# Patient Record
Sex: FEMALE | Race: WHITE | NOT HISPANIC OR LATINO | Employment: FULL TIME | URBAN - METROPOLITAN AREA
[De-identification: names, ages, dates, MRNs, and addresses within clinical notes are randomized per-mention and may not be internally consistent; named-entity substitution may affect disease eponyms.]

---

## 2017-02-21 RX ORDER — HYDROCHLOROTHIAZIDE 25 MG/1
TABLET ORAL
Qty: 90 TABLET | Refills: 3 | Status: SHIPPED | OUTPATIENT
Start: 2017-02-21 | End: 2018-02-05

## 2017-06-07 ENCOUNTER — OFFICE VISIT (OUTPATIENT)
Dept: FAMILY MEDICINE | Facility: CLINIC | Age: 47
End: 2017-06-07
Payer: COMMERCIAL

## 2017-06-07 VITALS
HEIGHT: 66 IN | DIASTOLIC BLOOD PRESSURE: 90 MMHG | HEART RATE: 85 BPM | BODY MASS INDEX: 41.49 KG/M2 | WEIGHT: 258.19 LBS | OXYGEN SATURATION: 98 % | SYSTOLIC BLOOD PRESSURE: 120 MMHG

## 2017-06-07 DIAGNOSIS — S23.100A SUBLUXATION OF COSTOVERTEBRAL JOINT, INITIAL ENCOUNTER: Primary | ICD-10-CM

## 2017-06-07 PROCEDURE — 99999 PR PBB SHADOW E&M-EST. PATIENT-LVL III: CPT | Mod: PBBFAC,,,

## 2017-06-07 PROCEDURE — 99213 OFFICE O/P EST LOW 20 MIN: CPT | Mod: S$GLB,,,

## 2017-06-07 RX ORDER — GABAPENTIN 300 MG/1
CAPSULE ORAL
Refills: 1 | COMMUNITY
Start: 2017-04-08 | End: 2018-02-05

## 2017-06-07 RX ORDER — CYCLOBENZAPRINE HCL 10 MG
10 TABLET ORAL 3 TIMES DAILY PRN
Qty: 30 TABLET | Refills: 1 | Status: SHIPPED | OUTPATIENT
Start: 2017-06-07 | End: 2017-06-17

## 2017-06-07 NOTE — PROGRESS NOTES
Subjective:       Patient ID: Teresa Valdes is a 46 y.o. female.    Chief Complaint: Back Pain    HPI She is complaining of stabbing pain for the past 2 days. She may have pulled a muscle retrieving her luggage at the airport returning from [place] for a Gastric sleeve procedure on May 31, 2017. She is also short of breath. Sharp stabbing pleuritic pain.        She is under the care of Dr. Faraz Diego for lumbar spinal stenosis.       Review of Systems   Constitutional: Positive for activity change and appetite change. Negative for chills, fatigue and unexpected weight change.        Recovering from surgery    HENT: Negative for trouble swallowing and voice change.    Eyes: Negative for visual disturbance.   Respiratory: Positive for shortness of breath. Negative for apnea and cough.    Cardiovascular: Negative.  Negative for chest pain, palpitations and leg swelling.   Gastrointestinal: Positive for nausea. Negative for abdominal distention, abdominal pain, constipation, diarrhea and vomiting.   Endocrine: Negative for cold intolerance, heat intolerance, polydipsia and polyuria.   Genitourinary: Negative.  Negative for difficulty urinating.   Musculoskeletal: Positive for back pain.   Skin: Negative for rash.   Allergic/Immunologic: Negative for environmental allergies.   Neurological: Positive for weakness.        Both legs    Hematological: Negative.  Negative for adenopathy. Does not bruise/bleed easily.   Psychiatric/Behavioral: Negative.    All other systems reviewed and are negative.      Objective:      Vitals:    06/07/17 1111   BP: (!) 120/90   Pulse: 85     Physical Exam   Constitutional: She is oriented to person, place, and time. She appears well-developed and well-nourished. She is active.  Non-toxic appearance. She does not have a sickly appearance. She does not appear ill. No distress.   HENT:   Head: Normocephalic and atraumatic.   Right Ear: External ear normal.   Left Ear: External ear normal.    Nose: Nose normal.   Hearing normal.    Eyes: Conjunctivae are normal. Pupils are equal, round, and reactive to light.   Neck: Normal range of motion. Neck supple. No thyroid mass and no thyromegaly present.   Cardiovascular: Normal rate, regular rhythm, normal heart sounds and intact distal pulses.  Exam reveals no gallop and no friction rub.    No murmur heard.  Pulses:       Dorsalis pedis pulses are 2+ on the right side, and 2+ on the left side.        Posterior tibial pulses are 2+ on the right side, and 2+ on the left side.   Pulmonary/Chest: Effort normal and breath sounds normal. No respiratory distress. She exhibits no tenderness.   Musculoskeletal: Normal range of motion. She exhibits no edema.   Lymphadenopathy:     She has no cervical adenopathy.   Neurological: She is alert and oriented to person, place, and time. She has normal strength. Coordination and gait normal.   Skin: Skin is warm and dry. She is not diaphoretic. No pallor.   Psychiatric: She has a normal mood and affect. Her speech is normal and behavior is normal. Judgment and thought content normal. Cognition and memory are normal.   Vitals reviewed.      Assessment:       1. Subluxation of costovertebral joint, initial encounter        Plan:       Subluxation of costovertebral joint, initial encounter    Other orders  -     cyclobenzaprine (FLEXERIL) 10 MG tablet; Take 1 tablet (10 mg total) by mouth 3 (three) times daily as needed for Muscle spasms.  Dispense: 30 tablet; Refill: 1      Return if symptoms worsen or fail to improve.     Consider chiropractic treatment

## 2017-10-25 ENCOUNTER — OFFICE VISIT (OUTPATIENT)
Dept: URGENT CARE | Facility: CLINIC | Age: 47
End: 2017-10-25
Payer: COMMERCIAL

## 2017-10-25 VITALS
RESPIRATION RATE: 18 BRPM | HEART RATE: 93 BPM | BODY MASS INDEX: 32.47 KG/M2 | HEIGHT: 66 IN | WEIGHT: 202 LBS | SYSTOLIC BLOOD PRESSURE: 130 MMHG | DIASTOLIC BLOOD PRESSURE: 80 MMHG | TEMPERATURE: 99 F | OXYGEN SATURATION: 99 %

## 2017-10-25 DIAGNOSIS — R09.82 POST-NASAL DRIP: ICD-10-CM

## 2017-10-25 DIAGNOSIS — R42 DIZZINESS: ICD-10-CM

## 2017-10-25 DIAGNOSIS — J02.9 ACUTE PHARYNGITIS, UNSPECIFIED ETIOLOGY: Primary | ICD-10-CM

## 2017-10-25 LAB
CTP QC/QA: YES
S PYO RRNA THROAT QL PROBE: NEGATIVE

## 2017-10-25 PROCEDURE — 87880 STREP A ASSAY W/OPTIC: CPT | Mod: QW,S$GLB,, | Performed by: EMERGENCY MEDICINE

## 2017-10-25 PROCEDURE — 96372 THER/PROPH/DIAG INJ SC/IM: CPT | Mod: S$GLB,,, | Performed by: EMERGENCY MEDICINE

## 2017-10-25 PROCEDURE — 99214 OFFICE O/P EST MOD 30 MIN: CPT | Mod: 25,S$GLB,, | Performed by: EMERGENCY MEDICINE

## 2017-10-25 RX ORDER — BETAMETHASONE SODIUM PHOSPHATE AND BETAMETHASONE ACETATE 3; 3 MG/ML; MG/ML
6 INJECTION, SUSPENSION INTRA-ARTICULAR; INTRALESIONAL; INTRAMUSCULAR; SOFT TISSUE
Status: COMPLETED | OUTPATIENT
Start: 2017-10-25 | End: 2017-10-25

## 2017-10-25 RX ORDER — MECLIZINE HYDROCHLORIDE 25 MG/1
25 TABLET ORAL 3 TIMES DAILY PRN
Qty: 15 TABLET | Refills: 0 | Status: SHIPPED | OUTPATIENT
Start: 2017-10-25 | End: 2018-02-05

## 2017-10-25 RX ORDER — AZITHROMYCIN 250 MG/1
TABLET, FILM COATED ORAL
Qty: 6 TABLET | Refills: 0 | Status: SHIPPED | OUTPATIENT
Start: 2017-10-25 | End: 2018-02-05

## 2017-10-25 RX ADMIN — BETAMETHASONE SODIUM PHOSPHATE AND BETAMETHASONE ACETATE 6 MG: 3; 3 INJECTION, SUSPENSION INTRA-ARTICULAR; INTRALESIONAL; INTRAMUSCULAR; SOFT TISSUE at 10:10

## 2017-10-25 NOTE — PROGRESS NOTES
"Subjective:       Patient ID: Teresa Valdes is a 47 y.o. female.    Vitals:  height is 5' 6" (1.676 m) and weight is 91.6 kg (202 lb). Her temperature is 98.5 °F (36.9 °C). Her blood pressure is 130/80 and her pulse is 93. Her respiration is 18 and oxygen saturation is 99%.     Chief Complaint: Sore Throat and Dizziness    2-3 d hx sore throat, post nasal drip, and felt dizzy this Am, would like z-gin prn, OK with steroid IM.      Sore Throat    This is a new problem. The current episode started in the past 7 days. The problem has been gradually worsening. There has been no fever. The pain is at a severity of 8/10. The pain is mild. Associated symptoms include ear pain, swollen glands and trouble swallowing. Pertinent negatives include no abdominal pain, congestion, coughing, headaches, hoarse voice or shortness of breath. She has tried oral narcotic analgesics and acetaminophen for the symptoms. The treatment provided no relief.   Dizziness:   Chronicity:  New  Onset:  Today  Progression since onset:  Gradually worsening  Frequency:  Constantly  Duration:  Very brief  Dizziness characteristics:  Off-balance and lightheaded/impending faint  Initial Spell Date and Length:  30 seconds   Associated symptoms: ear pain and light-headedness.no fever, no headaches, no nausea and no chest pain.  Treatments tried:  Nothing  Improvements on treatment:  No relief    Review of Systems   Constitution: Negative for chills, fever and malaise/fatigue.   HENT: Positive for ear pain, sore throat and trouble swallowing. Negative for congestion and hoarse voice.    Eyes: Negative for discharge and redness.   Cardiovascular: Negative for chest pain, dyspnea on exertion and leg swelling.   Respiratory: Negative for cough, shortness of breath, sputum production and wheezing.    Musculoskeletal: Negative for myalgias.   Gastrointestinal: Negative for abdominal pain and nausea.   Neurological: Positive for dizziness and light-headedness. " Negative for headaches.       Objective:      Physical Exam   Constitutional: She is oriented to person, place, and time. She appears well-developed and well-nourished.   HENT:   Head: Normocephalic and atraumatic.   Right Ear: External ear normal.   Left Ear: External ear normal.   Nose: Mucosal edema present. Right sinus exhibits no maxillary sinus tenderness and no frontal sinus tenderness. Left sinus exhibits no maxillary sinus tenderness and no frontal sinus tenderness.   Mouth/Throat: Uvula is midline and mucous membranes are normal. Oropharyngeal exudate and posterior oropharyngeal erythema present.   Eyes: EOM are normal. Pupils are equal, round, and reactive to light.   Neck: Normal range of motion. Neck supple.   Bilat anterior cervical LN tenderness to palp   Cardiovascular: Normal rate, regular rhythm and normal heart sounds.    Pulmonary/Chest: Breath sounds normal.   Musculoskeletal: Normal range of motion.   Neurological: She is alert and oriented to person, place, and time. No cranial nerve deficit. She exhibits normal muscle tone.   Skin: Skin is warm and dry.   Psychiatric: She has a normal mood and affect. Her behavior is normal.       Assessment:       1. Acute pharyngitis, unspecified etiology    2. Post-nasal drip    3. Dizziness        Plan:         Acute pharyngitis, unspecified etiology  -     POCT rapid strep A  -     betamethasone acetate-betamethasone sodium phosphate injection 6 mg; Inject 1 mL (6 mg total) into the muscle one time.  -     azithromycin (ZITHROMAX Z-MATILDE) 250 MG tablet; Take 2 tablets (500 mg) on  Day 1,  followed by 1 tablet (250 mg) once daily on Days 2 through 5.  Start in 1-2 days if not improved.  Dispense: 6 tablet; Refill: 0    Post-nasal drip  -     betamethasone acetate-betamethasone sodium phosphate injection 6 mg; Inject 1 mL (6 mg total) into the muscle one time.  -     azithromycin (ZITHROMAX Z-MATILDE) 250 MG tablet; Take 2 tablets (500 mg) on  Day 1,  followed  by 1 tablet (250 mg) once daily on Days 2 through 5.  Start in 1-2 days if not improved.  Dispense: 6 tablet; Refill: 0    Dizziness  -     meclizine (ANTIVERT) 25 mg tablet; Take 1 tablet (25 mg total) by mouth 3 (three) times daily as needed for Dizziness.  Dispense: 15 tablet; Refill: 0      John Bergman MD  Go to the Emergency Department for any problems

## 2017-10-25 NOTE — PATIENT INSTRUCTIONS
Dizziness (Vertigo) and Balance Problems: Staying Safe     Replace burned-out lightbulbs to keep your home safe and well lit.   Falls or accidents can lead to pain, broken bones, and fear of future falls. Protect yourself and others by preparing for episodes. Simple steps can help you stay safe at home and wherever you go.  Lighting  Keep all areas well lit. This helps your eyes send the right signals to the brain. It also makes you less likely to trip and fall. If bright lights make symptoms worse, dim the lights or lie in a dark room until the dizziness passes. Then turn the lights back to their normal level.  Tips:  · Keep a flashlight by the bed.  · Place nightlights in bathrooms and hallways.  · Replace burned-out bulbs, or have someone replace them for you.  Preventing falls  To reduce your risk of falling:  · Get out of bed or up from a chair slowly.  · Wear low-heeled shoes that fit properly and have slip-resistant soles.  · Remove throw rugs. Clear clutter from walkways.  · Use handrails on stairs. Have handrails installed or adjusted if needed.  · Install grab bars in the bathroom. Don't use towel racks for balance.  · Use a shower stool. Also put adhesive strips in the shower or on the tub floor.  Going out  With a little time and preparation, you can get around safely.  Tips:  · Bring a cane or walking aid if needed.  · Give yourself plenty of time in case you start to get dizzy.  · Ask your healthcare provider what type of exercise is safe for your condition.  · Be patient. If an activity such as walking through a crowded shop causes you stress, you may not be ready for it yet.  Driving  If you become dizzy or disoriented while driving, you could hurt yourself and others. That's why it's best to not drive until symptoms have gone away. In some cases, your license may be temporarily held until it's safe for you to drive again.  For safety:  · Ask a friend to drive for you.  · Take public  transportation.  · Walk to stores and other places when you can.  Asking for help  Don't be afraid to ask for help running errands, cooking meals, and doing exercise. Whether it's a friend, loved one, neighbor, or stranger on the street, a little help can make a world of difference.   Date Last Reviewed: 11/1/2016 © 2000-2017 [employer]. [address] [place] [address], [place] [address]. All rights reserved. This information is not intended as a substitute for professional medical care. Always follow your healthcare professional's instructions.        Inner Ear Problems: Causes of Dizziness (Vertigo)       Benign positional vertigo (BPV)  This is the most common cause of vertigo. BPV is also called benign positional paroxysmal vertigo (BPPV). It happens when crystals in the ear canals shift into the wrong place. Vertigo usually occurs when you move your head in a certain way. This can happen when turning in bed, bending, or looking up. Because BPV comes on quickly, you should think about if you are safe to drive or do other tasks that need your full attention.  BPV:  · Causes vertigo that last for seconds. Vertigo can occur several times a day, depending on body position.  · Doesnt cause hearing loss  · Often goes away on its own. But it but may go away sooner with treatment.  Infection or inflammation  Sometimes the semicircular canals swell and send incorrect balance signals. This problem may be caused by a viral infection. Depending on the cause, your hearing can be affected (labyrinthitis). Or your hearing can remain normal (neuronitis).  Infection or inflammation:  · Causes vertigo that lasts for hours or days. The first episode is usually the worst.  · Can cause hearing loss  · Often goes away on its own. But it may go away sooner with treatment.  You may need vestibular rehabilitation if you have balance problems that don't go away.  Menieres disease  This condition is uncommon. It happens when  there is too much fluid in the ear canals. This causes increased pressure and swelling. It affects balance and hearing signals.  Menieres disease may:  · Cause vertigo that last for hours  · Cause hearing problems that come and go. The problems are usually in one ear and get worse over time.  · Cause buzzing or ringing in the ears (tinnitus)  · Cause a feeling of fullness or pressure in the ear  · Cause any of these symptoms: vertigo, hearing loss, tinnitus, or ear fullness to last a lifetime  Date Last Reviewed: 11/1/2016  © 2126-4157 [employer]. [address] [place] [address]. All rights reserved. This information is not intended as a substitute for professional medical care. Always follow your healthcare professional's instructions.        When You Have a Sore Throat    A sore throat can be painful. There are many reasons why you may have a sore throat. Your healthcare provider will work with you to find the cause of your sore throat. He or she will also find the best treatment for you.  What causes a sore throat?  Sore throats can be caused or worsened by:  · Cold or flu viruses  · Bacteria  · Irritants such as tobacco smoke or air pollution  · Acid reflux  A healthy throat  The tonsils are on the sides of the throat near the base of the tongue. They collect viruses and bacteria and help fight infection. The throat (pharynx) is the passage for air. Mucus from the nasal cavity also moves down the passage.  An inflamed throat  The tonsils and pharynx can become inflamed due to a cold or flu virus. Postnasal drip (excess mucus draining from the nasal cavity) can irritate the throat. It can also make the throat or tonsils more likely to be infected by bacteria. Severe, untreated tonsillitis in children or adults can cause a pocket of pus (abscess) to form near the tonsil.  Your evaluation  A medical evaluation can help find the cause of your sore throat. It can also help your healthcare  provider choose the best treatment for you. The evaluation may include a health history, physical exam, and diagnostic tests.  Health history  Your healthcare provider may ask you the following:  · How long has the sore throat lasted and how have you been treating it?  · Do you have any other symptoms, such as body aches, fever, or cough?  · Does your sore throat recur? If so, how often? How many days of school or work have you missed because of a sore throat?  · Do you have trouble eating or swallowing?  · Have you been told that you snore or have other sleep problems?  · Do you have bad breath?  · Do you cough up bad-tasting mucus?  Physical exam  During the exam, your healthcare provider checks your ears, nose, and throat for problems. He or she also checks for swelling in the neck, and may listen to your chest.  Possible tests  Other tests your healthcare provider may perform include:  · A throat swab to check for bacteria such as streptococcus (the bacteria that causes strep throat)  · A blood test to check for mononucleosis (a viral infection)  · A chest X-ray to rule out pneumonia, especially if you have a cough  Treating a sore throat  Treatment depends on many factors. What is the likely cause? Is the problem recent? Does it keep coming back? In many cases, the best thing to do is to treat the symptoms, rest, and let the problem heal itself. Antibiotics may help clear up some bacterial infections. For cases of severe or recurring tonsillitis, the tonsils may need to be removed.  Relieving your symptoms  · Dont smoke, and avoid secondhand smoke.  · For children, try throat sprays or Popsicles. Adults and older children may try lozenges.  · Drink warm liquids to soothe the throat and help thin mucus. Avoid alcohol, spicy foods, and acidic drinks such as orange juice. These can irritate the throat.  · Gargle with warm saltwater (1 teaspoon of salt to 8 ounces of warm water).  · Use a humidifier to keep air  "moist and relieve throat dryness.  · Try over-the-counter pain relievers such as acetaminophen or ibuprofen. Use as directed, and dont exceed the recommended dose. Dont give aspirin to children.   Are antibiotics needed?  If your sore throat is due to a bacterial infection, antibiotics may speed healing and prevent complications. Although group A streptococcus ("strep throat" or GAS) is the major treatable infection for a sore throat, GAS causes only 5% to 15% of sore throats in adults who seek medical care. Most sore throats are caused by cold or flu viruses. And antibiotics dont treat viral illness. In fact, using antibiotics when theyre not needed may produce bacteria that are harder to kill. Your healthcare provider will prescribe antibiotics only if he or she thinks they are likely to help.  If antibiotics are prescribed  Take the medicine exactly as directed. Be sure to finish your prescription even if youre feeling better. And be sure to ask your healthcare provider or pharmacist what side effects are common and what to do about them.  Is surgery needed?  In some cases, tonsils need to be removed. This is often done as outpatient (same-day) surgery. Your healthcare provider may advise removing the tonsils in cases of:  · Several severe bouts of tonsillitis in a year. Severe episodes include those that lead to missed days of school or work, or that need to be treated with antibiotics.  · Tonsillitis that causes breathing problems during sleep  · Tonsillitis caused by food particles collecting in pouches in the tonsils (cryptic tonsillitis)  Call your healthcare provider if any of the following occur:  · Symptoms worsen, or new symptoms develop.  · Swollen tonsils make breathing difficult.  · The pain is severe enough to keep you from drinking liquids.  · A skin rash, hives, or wheezing develops. Any of these could signal an allergic reaction to antibiotics.  · Symptoms dont improve within a " week.  · Symptoms dont improve within 2 to 3 days of starting antibiotics.   Date Last Reviewed: 10/1/2016  © 9576-4372 [employer]. [address] [place] [address], [place] [address]. All rights reserved. This information is not intended as a substitute for professional medical care. Always follow your healthcare professional's instructions.        Pharyngitis: Strep (Presumed)    You have pharyngitis (sore throat). The cause is thought to be the streptococcus, or strep, bacterium. Strep throat infection can cause throat pain that is worse when swallowing, aching all over, headache, and fever. The infection may be spread by coughing, kissing, or touching others after touching your mouth or nose. Antibiotic medications are given to treat the infection.  Home care  · Rest at home. Drink plenty of fluids to avoid dehydration.  · No work or school for the first 2 days of taking the antibiotics. After this time, you will not be contagious. You can then return to work or school if you are feeling better.   · The antibiotic medication must be taken for the full 10 days, even if you feel better. This is very important to ensure the infection is treated. It is also important to prevent drug-resistant organisms from developing. If you were given an antibiotic shot, no more antibiotics are needed.  · You may use acetaminophen or ibuprofen to control pain or fever, unless another medicine was prescribed for this. If you have chronic liver or kidney disease or ever had a stomach ulcer or GI bleeding, talk with your doctor before using these medicines.  · Throat lozenges or a throat-numbing sprays can help reduce throat pain. Gargling with warm salt water can also help. Dissolve 1/2 teaspoon of salt in 1 8 ounce glass of warm water.   · Avoid salty or spicy foods, which can irritate the throat.  Follow-up care  Follow up with your healthcare provider or our staff if you are not improving over the next week.  When to seek  medical advice  Call your healthcare provider right away if any of these occur:  · Fever as directed by your doctor.   · New or worsening ear pain, sinus pain, or headache  · Painful lumps in the back of neck  · Stiff neck  · Lymph nodes are getting larger  · Inability to swallow liquids, excessive drooling, or inability to open mouth wide due to throat pain  · Signs of dehydration (very dark urine or no urine, sunken eyes, dizziness)  · Trouble breathing or noisy breathing  · Muffled voice  · New rash  Date Last Reviewed: 4/13/2015 © 2000-2017 [employer]. [address] [place] [address]. All rights reserved. This information is not intended as a substitute for professional medical care. Always follow your healthcare professional's instructions.        Understanding Your Sinuses  Your sinuses are air-filled spaces between the bones in your head. They have small openings that connect to the nasal cavity. The sinuses make mucus that drains into the nose. This helps keep the nose moist and free of dust and germs.      Parts of the nasal cavity  · The septum is the wall of cartilage and bone in the center of the nasal cavity.  · The middle meatus is the intersection between the sinuses.  · Turbinates are ridges on the sides of the nasal cavity.  Cilia keep sinuses clear    Air circulates freely though healthy sinuses. Tiny, hairlike structures called cilia line the sinuses. Cilia move the thin, watery mucus through the sinuses and into the nose. Sinuses are healthy when they drain freely. Sinus drainage can be blocked if the sinus lining is swollen or if mucus is too thick. Cilia that are damaged or dont work correctly can also lead to problems with drainage.  Date Last Reviewed: 10/1/2016  © 1993-1747 [employer]. [address] [place] [address]. All rights reserved. This information is not intended as a substitute for professional medical care. Always follow your  healthcare professional's instructions.      John Bergman MD  Go to the Emergency Department for any problems

## 2017-10-25 NOTE — LETTER
October 25, 2017      [facility]  [address] [address], [address]  [place] [address]  Phone: [phone]  Fax: [phone]       Patient: Teresa Valdes   YOB: 1970  Date of Visit: 10/25/2017    To Whom It May Concern:    LIBAN Valdes  was at [facility] on 10/25/2017. She may return to work/school on 10/27/17, earlier if feels better with no restrictions. If you have any questions or concerns, or if I can be of further assistance, please do not hesitate to contact me.    Sincerely,          John Bergman MD

## 2017-10-28 ENCOUNTER — TELEPHONE (OUTPATIENT)
Dept: URGENT CARE | Facility: CLINIC | Age: 47
End: 2017-10-28

## 2017-12-15 RX ORDER — VENLAFAXINE 37.5 MG/1
TABLET ORAL
Qty: 90 TABLET | Refills: 3 | Status: SHIPPED | OUTPATIENT
Start: 2017-12-15 | End: 2018-02-05

## 2018-02-05 ENCOUNTER — OFFICE VISIT (OUTPATIENT)
Dept: FAMILY MEDICINE | Facility: CLINIC | Age: 48
End: 2018-02-05
Payer: COMMERCIAL

## 2018-02-05 VITALS
BODY MASS INDEX: 28.21 KG/M2 | WEIGHT: 175.5 LBS | HEIGHT: 66 IN | SYSTOLIC BLOOD PRESSURE: 110 MMHG | DIASTOLIC BLOOD PRESSURE: 80 MMHG | OXYGEN SATURATION: 98 % | HEART RATE: 73 BPM

## 2018-02-05 DIAGNOSIS — L72.3 SEBACEOUS CYST: Primary | ICD-10-CM

## 2018-02-05 DIAGNOSIS — R55 SYNCOPE, UNSPECIFIED SYNCOPE TYPE: ICD-10-CM

## 2018-02-05 DIAGNOSIS — Z98.84 BARIATRIC SURGERY STATUS: ICD-10-CM

## 2018-02-05 DIAGNOSIS — Z13.220 LIPID SCREENING: ICD-10-CM

## 2018-02-05 DIAGNOSIS — Z29.9 PREVENTIVE MEASURE: ICD-10-CM

## 2018-02-05 PROBLEM — R42 DIZZINESS: Status: RESOLVED | Noted: 2017-10-25 | Resolved: 2018-02-05

## 2018-02-05 PROBLEM — J02.9 ACUTE PHARYNGITIS: Status: RESOLVED | Noted: 2017-10-25 | Resolved: 2018-02-05

## 2018-02-05 PROBLEM — R09.82 POST-NASAL DRIP: Status: RESOLVED | Noted: 2017-10-25 | Resolved: 2018-02-05

## 2018-02-05 PROCEDURE — 90471 IMMUNIZATION ADMIN: CPT | Mod: S$GLB,,,

## 2018-02-05 PROCEDURE — 3008F BODY MASS INDEX DOCD: CPT | Mod: S$GLB,,,

## 2018-02-05 PROCEDURE — 90715 TDAP VACCINE 7 YRS/> IM: CPT | Mod: S$GLB,,,

## 2018-02-05 PROCEDURE — 99999 PR PBB SHADOW E&M-EST. PATIENT-LVL IV: CPT | Mod: PBBFAC,,,

## 2018-02-05 PROCEDURE — 99214 OFFICE O/P EST MOD 30 MIN: CPT | Mod: 25,S$GLB,,

## 2018-02-05 RX ORDER — VENLAFAXINE 37.5 MG/1
TABLET ORAL
Qty: 90 TABLET | Refills: 3 | Status: SHIPPED | OUTPATIENT
Start: 2018-02-05 | End: 2018-11-21 | Stop reason: SDUPTHER

## 2018-02-05 NOTE — PROGRESS NOTES
Subjective:       Patient ID: Teresa Valdes is a 47 y.o. female.    Chief Complaint: Loss of Consciousness; Dizziness; and Cyst (on back)    HPI The patient presents with complaints of a bothersome lump on her mid back that has been present for 7 years. It continues to get larger.    The patient was drying off after taking a shower. She sat on the toilet and woke up on the floor. Last Thursday. The second incident was after she stood up to brush her teeth. The second incident she hit her head. 6 AM getting up for to go to work. . No previous syncope. Skipped a meal last week.      The patient had gastric sleeve procedure in [place] in May 2017. She lost 40 lbs before her surgery and 80 since surgery. Her back pain has improved.     Review of Systems   Constitutional: Negative.  Negative for activity change, appetite change, chills, diaphoresis, fatigue and fever.   Respiratory: Negative.  Negative for shortness of breath.    Cardiovascular: Negative.  Negative for chest pain.   Psychiatric/Behavioral: Negative.    All other systems reviewed and are negative.      Objective:      Vitals:    02/05/18 1336   BP: 110/80   Pulse: 73     Physical Exam   Constitutional: She is oriented to person, place, and time. She appears well-developed and well-nourished. She is active.  Non-toxic appearance. She does not have a sickly appearance. She does not appear ill. No distress.   HENT:   Head: Normocephalic and atraumatic.   Right Ear: External ear normal.   Left Ear: External ear normal.   Nose: Nose normal.   Hearing normal.    Eyes: Conjunctivae are normal. Pupils are equal, round, and reactive to light.   Neck: Normal range of motion. Neck supple. No thyroid mass and no thyromegaly present.   Cardiovascular: Normal rate, regular rhythm, normal heart sounds and intact distal pulses.  Exam reveals no gallop and no friction rub.    No murmur heard.  Pulses:       Dorsalis pedis pulses are 2+ on the right side, and  2+ on the left side.        Posterior tibial pulses are 2+ on the right side, and 2+ on the left side.   Pulmonary/Chest: Effort normal and breath sounds normal. No respiratory distress. She exhibits no tenderness.   Musculoskeletal: Normal range of motion. She exhibits no edema.   Lymphadenopathy:     She has no cervical adenopathy.   Neurological: She is alert and oriented to person, place, and time. She has normal strength. Coordination and gait normal.   Skin: Skin is warm and dry. She is not diaphoretic. No pallor.   Psychiatric: She has a normal mood and affect. Her speech is normal and behavior is normal. Judgment and thought content normal. Cognition and memory are normal.   Vitals reviewed.                Assessment:       1. Sebaceous cyst    2. Syncope, unspecified syncope type    3. Bariatric surgery status    4. Lipid screening    5. Preventive measure        Plan:       Sebaceous cyst  -     Ambulatory referral to General Surgery    Syncope, unspecified syncope type    Bariatric surgery status  -     CBC auto differential; Future; Expected date: 02/05/2018  -     Comprehensive metabolic panel; Future; Expected date: 02/05/2018  -     Ferritin; Future; Expected date: 02/05/2018  -     Iron and TIBC; Future; Expected date: 02/05/2018  -     Magnesium; Future; Expected date: 02/05/2018  -     TSH; Future; Expected date: 02/05/2018  -     Folate; Future; Expected date: 02/05/2018  -     Vitamin B12; Future; Expected date: 02/05/2018  -     VITAMIN B1; Future; Expected date: 02/05/2018    Lipid screening  -     Lipid panel; Future; Expected date: 02/05/2018    Preventive measure  -     (In Office Administered) Tdap Vaccine    Other orders  -     venlafaxine (EFFEXOR) 37.5 MG Tab; TAKE 1 TABLET (37.5 MG TOTAL) BY MOUTH ONCE DAILY.  Dispense: 90 tablet; Refill: 3      Follow-up in about 3 months (around 5/5/2018).

## 2018-02-12 ENCOUNTER — OFFICE VISIT (OUTPATIENT)
Dept: SURGERY | Facility: CLINIC | Age: 48
End: 2018-02-12
Payer: COMMERCIAL

## 2018-02-12 VITALS
SYSTOLIC BLOOD PRESSURE: 110 MMHG | TEMPERATURE: 98 F | OXYGEN SATURATION: 97 % | BODY MASS INDEX: 28.03 KG/M2 | WEIGHT: 174.38 LBS | HEART RATE: 80 BPM | DIASTOLIC BLOOD PRESSURE: 76 MMHG | HEIGHT: 66 IN

## 2018-02-12 DIAGNOSIS — Z01.810 PREOP CARDIOVASCULAR EXAM: ICD-10-CM

## 2018-02-12 DIAGNOSIS — L72.3 SEBACEOUS CYST: Primary | ICD-10-CM

## 2018-02-12 DIAGNOSIS — Z98.84 S/P LAPAROSCOPIC SLEEVE GASTRECTOMY: ICD-10-CM

## 2018-02-12 DIAGNOSIS — E66.3 OVERWEIGHT (BMI 25.0-29.9): ICD-10-CM

## 2018-02-12 PROCEDURE — 3008F BODY MASS INDEX DOCD: CPT | Mod: S$GLB,,, | Performed by: SURGERY

## 2018-02-12 PROCEDURE — 99999 PR PBB SHADOW E&M-EST. PATIENT-LVL IV: CPT | Mod: PBBFAC,,, | Performed by: SURGERY

## 2018-02-12 PROCEDURE — 99204 OFFICE O/P NEW MOD 45 MIN: CPT | Mod: S$GLB,,, | Performed by: SURGERY

## 2018-02-12 NOTE — PROGRESS NOTES
Subjective:      Patient ID: Teresa Valdes is a 47 y.o. female.    Chief Complaint: Consult and Cyst  48yo female presents alone for evaluation and treatment of large mid back sebaceous cyst that has been present for many years.  It has slowly increased in size over the years.  It is mildly uncomfortable when pressure is applied.  It has never been infected.  She is very self-conscious about it, as people hug her and feel it and asked her what it is.  No fevers or chills.  She underwent a lap gastric sleeve in [place] May 2017.  She weighed over 300 pounds at that time, now weighs 174.  No problems from that surgery.  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  She would like to schedule the surgery prior to a break from school.  She has no other complaints.    Past Medical History:   Diagnosis Date    Anxiety     Depression     Hypertension      Past Surgical History:   Procedure Laterality Date    ADENOIDECTOMY      BREAST SURGERY      Abscess     CHOLECYSTECTOMY      sleeve gastroectomy  06/2017    in [place]    TONSILLECTOMY      Uvuloplasty       Family History   Problem Relation Age of Onset    Heart disease Neg Hx     Cancer Neg Hx      Social History     Social History    Marital status:      Spouse name: N/A    Number of children: N/A    Years of education: N/A     Occupational History     [employer]     Social History Main Topics    Smoking status: Former Smoker     Packs/day: 10.00     Types: Cigarettes     Start date: 3/7/2017    Smokeless tobacco: Never Used    Alcohol use No    Drug use: No    Sexual activity: Yes     Other Topics Concern    None     Social History Narrative    She teaches Band at middle school. Lives with her  and 2 children in [place]. She has an older daughter who she gave up for adoption. Exercising.        Current Outpatient Prescriptions   Medication Sig Dispense Refill    venlafaxine  "(EFFEXOR) 37.5 MG Tab TAKE 1 TABLET (37.5 MG TOTAL) BY MOUTH ONCE DAILY. 90 tablet 3     No current facility-administered medications for this visit.      Review of patient's allergies indicates:  No Known Allergies    ROS:  All systems were reviewed and are negative, except that mentioned in the HPI.    Objective:     Vitals:    02/12/18 1318   BP: 110/76   Pulse: 80   Temp: 98.2 °F (36.8 °C)   SpO2: 97%   Weight: 79.1 kg (174 lb 6.1 oz)   Height: 5' 6" (1.676 m)     Physical Exam   Constitutional: She is oriented to person, place, and time. She appears well-developed and well-nourished. No distress.   HENT:   Head: Normocephalic and atraumatic.   Eyes: EOM are normal. Pupils are equal, round, and reactive to light. No scleral icterus.   Neck: Normal range of motion. No JVD present.   Cardiovascular: Normal rate and regular rhythm.    Pulmonary/Chest: Effort normal and breath sounds normal. No respiratory distress.   Abdominal: Soft. She exhibits no distension.   Musculoskeletal: Normal range of motion.   Large non-mobile noninfected sebaceous cyst of the mid back, slightly to the right of midline.  It measures 5 cm x 4.5 cm.  A central punctum is noted nontender.   Neurological: She is alert and oriented to person, place, and time. No cranial nerve deficit.   Skin: Skin is warm and dry. She is not diaphoretic.   Psychiatric: She has a normal mood and affect.       Lab Review: CBC:   Lab Results   Component Value Date    WBC 5.54 02/07/2018    RBC 4.25 02/07/2018    HGB 14.3 02/07/2018    HCT 41.6 02/07/2018     02/07/2018     BMP:   Lab Results   Component Value Date    GLU 94 02/07/2018     02/07/2018    K 4.3 02/07/2018     02/07/2018    CO2 26 02/07/2018    BUN 12 02/07/2018    CREATININE 0.58 02/07/2018    CALCIUM 9.5 02/07/2018     Lab Results   Component Value Date    ALT 26 02/07/2018    AST 21 02/07/2018    ALKPHOS 60 02/07/2018    BILITOT 0.7 02/07/2018       Assessment:     1. " Sebaceous cyst    2. Overweight (BMI 25.0-29.9)    3. S/P laparoscopic sleeve gastrectomy      Plan:   The pathology of the patient's disease was discussed.  Elective excision was recommended. The surgical procedure, risks, postop recovery and consent were discussed. All questions were answered and the consent was signed. Signs and symptoms of worsening disease was discussed.  The patient will call or go to ER should those symptoms develop.  Surgery is scheduled for April 2, 2008 by patient request.  Continue diet, exercise and weight loss was encouraged.

## 2018-02-12 NOTE — LETTER
February 12, 2018      Owen Medrano Jr., MD  [address]  [facility] [address]           [facility]  [address] [facility] [place] [address]  [facility] [address]  Phone: [phone]  Fax: [phone]          Patient: Teresa Valdes   MR Number: [id number]   YOB: 1970   Date of Visit: 2/12/2018       Dear Dr. Owen Medrano Jr.:    Thank you for referring Teresa Valdes to me for evaluation. Attached you will find relevant portions of my assessment and plan of care.    If you have questions, please do not hesitate to call me. I look forward to following Teresa Valdes along with you.    Sincerely,    Natalie Francisco,     Enclosure  CC:  No Recipients    If you would like to receive this communication electronically, please contact [email or web address] or [phone] to request more information on [employer] Link access.    For providers and/or their staff who would like to refer a patient to [facility], please contact us through our one-stop-shop provider referral line, [facility], at [phone].    If you feel you have received this communication in error or would no longer like to receive these types of communications, please e-mail [email or web address]

## 2018-04-02 PROBLEM — L72.3 SEBACEOUS CYST: Status: ACTIVE | Noted: 2018-04-02

## 2018-04-02 PROBLEM — E66.3 OVERWEIGHT (BMI 25.0-29.9): Status: ACTIVE | Noted: 2018-04-02

## 2018-04-16 ENCOUNTER — OFFICE VISIT (OUTPATIENT)
Dept: SURGERY | Facility: CLINIC | Age: 48
End: 2018-04-16
Payer: COMMERCIAL

## 2018-04-16 VITALS
WEIGHT: 166.38 LBS | SYSTOLIC BLOOD PRESSURE: 90 MMHG | HEIGHT: 66 IN | HEART RATE: 62 BPM | DIASTOLIC BLOOD PRESSURE: 60 MMHG | TEMPERATURE: 98 F | BODY MASS INDEX: 26.74 KG/M2 | OXYGEN SATURATION: 98 %

## 2018-04-16 DIAGNOSIS — Z98.84 S/P LAPAROSCOPIC SLEEVE GASTRECTOMY: ICD-10-CM

## 2018-04-16 DIAGNOSIS — E66.3 OVERWEIGHT (BMI 25.0-29.9): ICD-10-CM

## 2018-04-16 DIAGNOSIS — L72.3 SEBACEOUS CYST: Primary | ICD-10-CM

## 2018-04-16 PROCEDURE — 3074F SYST BP LT 130 MM HG: CPT | Mod: CPTII,S$GLB,, | Performed by: SURGERY

## 2018-04-16 PROCEDURE — 99213 OFFICE O/P EST LOW 20 MIN: CPT | Mod: S$GLB,,, | Performed by: SURGERY

## 2018-04-16 PROCEDURE — 3078F DIAST BP <80 MM HG: CPT | Mod: CPTII,S$GLB,, | Performed by: SURGERY

## 2018-04-16 PROCEDURE — 99999 PR PBB SHADOW E&M-EST. PATIENT-LVL III: CPT | Mod: PBBFAC,,, | Performed by: SURGERY

## 2018-04-16 NOTE — PROGRESS NOTES
Subjective:       Patient ID: Teresa Valdes is a 47 y.o. female.    Chief Complaint: Post-op Evaluation    HPI   No c/o except mild pruritis at incision. Steri's off. No wound prob, no drainage.   She states she had a great experience from her OR experience.    Review of Systems    All systems were reviewed and are negative, except that mentioned in the HPI.    Objective:      Physical Exam   Constitutional: She is oriented to person, place, and time. She appears well-developed and well-nourished. No distress.   HENT:   Head: Normocephalic and atraumatic.   Eyes: EOM are normal. Pupils are equal, round, and reactive to light. No scleral icterus.   Neck: Normal range of motion. No JVD present.   Cardiovascular: Normal rate and regular rhythm.    Pulmonary/Chest: Effort normal and breath sounds normal. No respiratory distress.   Abdominal: Soft. She exhibits no distension.   Musculoskeletal: Normal range of motion.   Neurological: She is alert and oriented to person, place, and time. No cranial nerve deficit.   Skin: Skin is warm and dry. She is not diaphoretic.   Well-healed mid-back incision. No dehiscence. Mild healing ridge.   Psychiatric: She has a normal mood and affect.       Path- reviewed, benign seb cyst    Assessment:       1. Sebaceous cyst    2. Overweight (BMI 25.0-29.9)    3. S/P laparoscopic sleeve gastrectomy        Plan:   46yo female 2 wk s/p excision back mass:  -pt has healed well  -ok to gradually resume unrestricted activity  -f/u appt declined, pt prefers to f/u prn  -all questions answered

## 2018-11-21 ENCOUNTER — OFFICE VISIT (OUTPATIENT)
Dept: INTERNAL MEDICINE | Facility: CLINIC | Age: 48
End: 2018-11-21
Payer: COMMERCIAL

## 2018-11-21 VITALS
HEART RATE: 65 BPM | BODY MASS INDEX: 23.3 KG/M2 | HEIGHT: 66 IN | RESPIRATION RATE: 16 BRPM | OXYGEN SATURATION: 98 % | WEIGHT: 145 LBS | SYSTOLIC BLOOD PRESSURE: 110 MMHG | DIASTOLIC BLOOD PRESSURE: 80 MMHG

## 2018-11-21 DIAGNOSIS — Z12.31 ENCOUNTER FOR SCREENING MAMMOGRAM FOR BREAST CANCER: ICD-10-CM

## 2018-11-21 DIAGNOSIS — Z98.84 S/P LAPAROSCOPIC SLEEVE GASTRECTOMY: ICD-10-CM

## 2018-11-21 DIAGNOSIS — Z01.419 WOMEN'S ANNUAL ROUTINE GYNECOLOGICAL EXAMINATION: ICD-10-CM

## 2018-11-21 DIAGNOSIS — K21.9 GASTROESOPHAGEAL REFLUX DISEASE, ESOPHAGITIS PRESENCE NOT SPECIFIED: ICD-10-CM

## 2018-11-21 DIAGNOSIS — Z78.9 PARTICIPANT IN HEALTH AND WELLNESS PLAN: Primary | ICD-10-CM

## 2018-11-21 PROCEDURE — 99396 PREV VISIT EST AGE 40-64: CPT | Mod: S$GLB,,, | Performed by: INTERNAL MEDICINE

## 2018-11-21 PROCEDURE — 99999 PR PBB SHADOW E&M-EST. PATIENT-LVL IV: CPT | Mod: PBBFAC,,, | Performed by: INTERNAL MEDICINE

## 2018-11-21 PROCEDURE — 3079F DIAST BP 80-89 MM HG: CPT | Mod: CPTII,S$GLB,, | Performed by: INTERNAL MEDICINE

## 2018-11-21 PROCEDURE — 3074F SYST BP LT 130 MM HG: CPT | Mod: CPTII,S$GLB,, | Performed by: INTERNAL MEDICINE

## 2018-11-21 RX ORDER — OMEPRAZOLE 20 MG/1
20 CAPSULE, DELAYED RELEASE ORAL DAILY
Qty: 30 CAPSULE | Refills: 11 | Status: SHIPPED | OUTPATIENT
Start: 2018-11-21 | End: 2020-06-11 | Stop reason: SDUPTHER

## 2018-11-21 RX ORDER — VENLAFAXINE 37.5 MG/1
TABLET ORAL
Qty: 90 TABLET | Refills: 3 | Status: SHIPPED | OUTPATIENT
Start: 2018-11-21 | End: 2019-11-29 | Stop reason: SDUPTHER

## 2018-11-21 NOTE — PROGRESS NOTES
"Subjective:      Patient ID: Teresa Valdes is a 48 y.o. female.    Chief Complaint: Establish Care    HPI: 48 y.o. White female, here to both establish care and for her annual exam.  She got the Flu vaccine at her work.  She is due both Pap and a Mammogram.  She began smoking 3 months ago.  She does not take her Vit B12 caps anymore; she had a gastric sleeve 11/2 years ago loosing >150 # since.  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          Review of Systems   Constitutional: Negative.    HENT: Negative.    Eyes: Negative.    Respiratory: Negative.    Cardiovascular: Negative.    Gastrointestinal:        Gastritis type discomfort   Endocrine: Negative.    Genitourinary: Negative.    Musculoskeletal: Negative.    Skin: Negative.    Allergic/Immunologic: Negative.    Neurological: Negative.    Hematological: Negative.    Psychiatric/Behavioral: Negative.        Objective:   /80 (BP Location: Left arm, Patient Position: Sitting, BP Method: Large (Manual))   Pulse 65   Resp 16   Ht 5' 6" (1.676 m)   Wt 65.8 kg (145 lb)   SpO2 98%   BMI 23.40 kg/m²     Physical Exam   Constitutional: She is oriented to person, place, and time. She appears well-developed and well-nourished.   HENT:   Head: Normocephalic and atraumatic.   Right Ear: External ear normal.   Left Ear: External ear normal.   Nose: Nose normal.   Mouth/Throat: Oropharynx is clear and moist.   Eyes: Conjunctivae and EOM are normal. Pupils are equal, round, and reactive to light.   Neck: Normal range of motion. Neck supple.   Cardiovascular: Normal rate, regular rhythm and normal heart sounds.   Pulmonary/Chest: Effort normal and breath sounds normal.   Abdominal: Soft. Bowel sounds are normal.   Musculoskeletal: Normal range of motion.   Neurological: She is alert and oriented to person, place, and time.   Skin: Skin is warm and dry.   Psychiatric: She has a normal mood and affect. Her behavior is normal. Judgment and thought content normal. "   Nursing note and vitals reviewed.      Assessment:     1. Participant in health and wellness plan    2. S/P laparoscopic sleeve gastrectomy    3. Encounter for screening mammogram for breast cancer    4. Women's annual routine gynecological examination    5. Gastroesophageal reflux disease, esophagitis presence not specified    She will get her mammogram,Pap this year.  Plan:     Participant in health and wellness plan  -     CBC auto differential; Future; Expected date: 11/21/2018  -     Comprehensive metabolic panel; Future; Expected date: 11/21/2018  -     Lipid panel; Future; Expected date: 11/21/2018  -     Urinalysis; Future; Expected date: 11/21/2018    S/P laparoscopic sleeve gastrectomy  -     Vitamin B12; Future; Expected date: 11/21/2018    Encounter for screening mammogram for breast cancer  -     Mammo Digital Screening Bilat; Future; Expected date: 11/21/2018    Women's annual routine gynecological examination  -     Mammo Digital Screening Bilat; Future; Expected date: 11/21/2018    Gastroesophageal reflux disease, esophagitis presence not specified  -     omeprazole (PRILOSEC) 20 MG capsule; Take 1 capsule (20 mg total) by mouth once daily.  Dispense: 30 capsule; Refill: 11    Other orders  -     venlafaxine (EFFEXOR) 37.5 MG Tab; TAKE 1 TABLET (37.5 MG TOTAL) BY MOUTH ONCE DAILY.  Dispense: 90 tablet; Refill: 3

## 2018-12-06 ENCOUNTER — OFFICE VISIT (OUTPATIENT)
Dept: OBSTETRICS AND GYNECOLOGY | Facility: CLINIC | Age: 48
End: 2018-12-06
Payer: COMMERCIAL

## 2018-12-06 VITALS
BODY MASS INDEX: 23.59 KG/M2 | SYSTOLIC BLOOD PRESSURE: 108 MMHG | WEIGHT: 146.81 LBS | HEIGHT: 66 IN | DIASTOLIC BLOOD PRESSURE: 66 MMHG

## 2018-12-06 DIAGNOSIS — N95.1 MENOPAUSAL SYMPTOMS: ICD-10-CM

## 2018-12-06 DIAGNOSIS — N92.6 MENSES, IRREGULAR: ICD-10-CM

## 2018-12-06 DIAGNOSIS — Z01.419 WELL WOMAN EXAM WITH ROUTINE GYNECOLOGICAL EXAM: Primary | ICD-10-CM

## 2018-12-06 PROCEDURE — 99999 PR PBB SHADOW E&M-EST. PATIENT-LVL III: CPT | Mod: PBBFAC,,, | Performed by: OBSTETRICS & GYNECOLOGY

## 2018-12-06 PROCEDURE — 99386 PREV VISIT NEW AGE 40-64: CPT | Mod: S$GLB,,, | Performed by: OBSTETRICS & GYNECOLOGY

## 2018-12-06 PROCEDURE — 3074F SYST BP LT 130 MM HG: CPT | Mod: CPTII,S$GLB,, | Performed by: OBSTETRICS & GYNECOLOGY

## 2018-12-06 PROCEDURE — 3078F DIAST BP <80 MM HG: CPT | Mod: CPTII,S$GLB,, | Performed by: OBSTETRICS & GYNECOLOGY

## 2018-12-06 PROCEDURE — 88175 CYTOPATH C/V AUTO FLUID REDO: CPT

## 2018-12-06 NOTE — LETTER
December 6, 2018      Emerita Hernandez MD  [address]  [address]   [facility] [address]           [address]  [facility] [address]  Phone: [phone]          Patient: Teersa Valdes   MR Number: [id number]   YOB: 1970   Date of Visit: 12/6/2018       Dear Dr. Emerita Hernandez:    Thank you for referring Teresa Valdes to me for evaluation. Attached you will find relevant portions of my assessment and plan of care.    If you have questions, please do not hesitate to call me. I look forward to following Teresa Valdes along with you.    Sincerely,    Patrick Rodriguez MD    Enclosure  CC:  No Recipients    If you would like to receive this communication electronically, please contact [email or web address] or [phone] to request more information on [employer] Link access.    For providers and/or their staff who would like to refer a patient to [facility], please contact us through our one-stop-shop provider referral line, [facility], at [phone].    If you feel you have received this communication in error or would no longer like to receive these types of communications, please e-mail [email or web address]

## 2018-12-06 NOTE — PROGRESS NOTES
CC: Annual check-up    SUBJECTIVE:   48 y.o. female   for annual routine Pap and checkup. Patient's last menstrual period was 10/15/2018 (within weeks)..  She has no unusual complaints.    [place] and has lost 170lbs no pap in 5 yrs, was last seen at [facility] by Dr. Bangura    Past Medical History:   Diagnosis Date    Anxiety     Depression     Hypertension     resolved     Past Surgical History:   Procedure Laterality Date    ADENOIDECTOMY      BREAST SURGERY      Abscess     CHOLECYSTECTOMY      CYST REMOVAL      on back    cyst removed left wrist      EXCISION-MASS-BACK  2018    [facility] OR    KNEE LIGAMENT RECONSTRUCTION Left     sleeve gastroectomy  2017    in [place]    TONSILLECTOMY      Uvuloplasty       Social History     Socioeconomic History    Marital status:      Spouse name: Not on file    Number of children: Not on file    Years of education: Not on file    Highest education level: Not on file   Social Needs    Financial resource strain: Not on file    Food insecurity - worry: Not on file    Food insecurity - inability: Not on file    Transportation needs - medical: Not on file    Transportation needs - non-medical: Not on file   Occupational History     Employer: [employer]   Tobacco Use    Smoking status: Current Every Day Smoker     Packs/day: 0.50     Types: Cigarettes     Start date: 3/7/2017    Smokeless tobacco: Never Used   Substance and Sexual Activity    Alcohol use: Yes     Alcohol/week: 1.2 oz     Types: 2 Glasses of wine per week     Comment: occ    Drug use: No    Sexual activity: Yes     Partners: Male     Birth control/protection: Partner-Vasectomy     Comment:    Other Topics Concern    Not on file   Social History Narrative    She teaches Band at middle school. Lives with her  and 2 children in [place]. She has an older daughter who  she gave up for adoption. Exercising.      Family History   Problem Relation Age of Onset    Arthritis Mother     Hypertension Father     Heart disease Father     Cancer Neg Hx      OB History    Para Term  AB Living   4 3 3   1 3   SAB TAB Ectopic Multiple Live Births   1       3      # Outcome Date GA Lbr Frankie/2nd Weight Sex Delivery Anes PTL Lv   4 Term 02    M Vag-Spont   ALE   3 Term 00    F Vag-Spont   ALE   2 Term 89    F Vag-Spont   ALE   1 SAB                     Current Outpatient Medications   Medication Sig Dispense Refill    multivitamin capsule Take 1 capsule by mouth once daily.      omeprazole (PRILOSEC) 20 MG capsule Take 1 capsule (20 mg total) by mouth once daily. 30 capsule 11    venlafaxine (EFFEXOR) 37.5 MG Tab TAKE 1 TABLET (37.5 MG TOTAL) BY MOUTH ONCE DAILY. 90 tablet 3     No current facility-administered medications for this visit.      Allergies: Patient has no known allergies.     ROS:  Constitutional: no weight loss, weight gain, fever, fatigue  Eyes:  No vision changes, glasses/contacts  ENT/Mouth: No ulcers, sinus problems, ears ringing, headache  Cardiovascular: No inability to lie flat, chest pain, exercise intolerance, swelling, heart palpitations  Respiratory: No wheezing, coughing blood, shortness of breath, or cough  Gastrointestinal: No diarrhea, bloody stool, nausea/vomiting, constipation, gas, hemorrhoids  Genitourinary: No blood in urine, painful urination, urgency of urination, frequency of urination, incomplete emptying, incontinence, abnormal bleeding, painful periods, heavy periods, vaginal discharge, vaginal odor, painful intercourse, sexual problems, bleeding after intercourse.  Musculoskeletal: No muscle weakness  Skin/Breast: No painful breasts, nipple discharge, masses, rash, ulcers  Neurological: No passing out, seizures, numbness, headache  Endocrine: No diabetes, hypothyroid, hyperthyroid, hot flashes, hair loss, abnormal  "hair growth, ance  Psychiatric: No depression, crying  Hematologic: No bruises, bleeding, swollen lymph nodes, anemia.      OBJECTIVE:   The patient appears well, alert, oriented x 3, in no distress.  /66   Ht 5' 6" (1.676 m)   Wt 66.6 kg (146 lb 13.2 oz)   LMP 10/15/2018 (Within Weeks)   BMI 23.70 kg/m²   NECK: no thyromegaly, trachea midline  SKIN: no acne, striae, hirsutism  BREAST EXAM: breasts appear normal, no suspicious masses, no skin or nipple changes or axillary nodes, symmetric fibrous changes in both upper outer quadrants  ABDOMEN: no hernias, masses, or hepatosplenomegaly  GENITALIA: normal external genitalia, no erythema, no discharge  URETHRA: normal urethra, normal urethral meatus  VAGINA: Normal  CERVIX: no lesions or cervical motion tenderness  UTERUS: normal  ADNEXA: normal adnexa      ASSESSMENT:   well woman  1. Well woman exam with routine gynecological exam    2. Menopausal symptoms    3. Menses, irregular        PLAN:   mammogram  pap smear  return annually or prn  Orders Placed This Encounter    Liquid-based pap smear, screening     "

## 2019-11-29 RX ORDER — VENLAFAXINE 37.5 MG/1
TABLET ORAL
Qty: 90 TABLET | Refills: 0 | Status: SHIPPED | OUTPATIENT
Start: 2019-11-29 | End: 2020-03-19

## 2019-11-29 NOTE — TELEPHONE ENCOUNTER
Left a messge asking pt to call back regarding scheduling an appointment before further refills can be given

## 2020-02-27 ENCOUNTER — OFFICE VISIT (OUTPATIENT)
Dept: OBSTETRICS AND GYNECOLOGY | Facility: CLINIC | Age: 50
End: 2020-02-27
Payer: COMMERCIAL

## 2020-02-27 VITALS
BODY MASS INDEX: 25.44 KG/M2 | SYSTOLIC BLOOD PRESSURE: 122 MMHG | DIASTOLIC BLOOD PRESSURE: 86 MMHG | WEIGHT: 157.63 LBS

## 2020-02-27 DIAGNOSIS — N89.8 VAGINAL DISCHARGE: ICD-10-CM

## 2020-02-27 DIAGNOSIS — Z12.39 BREAST CANCER SCREENING: ICD-10-CM

## 2020-02-27 DIAGNOSIS — Z01.419 WELL WOMAN EXAM WITH ROUTINE GYNECOLOGICAL EXAM: Primary | ICD-10-CM

## 2020-02-27 PROCEDURE — 88175 CYTOPATH C/V AUTO FLUID REDO: CPT

## 2020-02-27 PROCEDURE — 3074F PR MOST RECENT SYSTOLIC BLOOD PRESSURE < 130 MM HG: ICD-10-PCS | Mod: CPTII,S$GLB,, | Performed by: OBSTETRICS & GYNECOLOGY

## 2020-02-27 PROCEDURE — 99396 PR PREVENTIVE VISIT,EST,40-64: ICD-10-PCS | Mod: S$GLB,,, | Performed by: OBSTETRICS & GYNECOLOGY

## 2020-02-27 PROCEDURE — 99396 PREV VISIT EST AGE 40-64: CPT | Mod: S$GLB,,, | Performed by: OBSTETRICS & GYNECOLOGY

## 2020-02-27 PROCEDURE — 3074F SYST BP LT 130 MM HG: CPT | Mod: CPTII,S$GLB,, | Performed by: OBSTETRICS & GYNECOLOGY

## 2020-02-27 PROCEDURE — 99999 PR PBB SHADOW E&M-EST. PATIENT-LVL III: ICD-10-PCS | Mod: PBBFAC,,, | Performed by: OBSTETRICS & GYNECOLOGY

## 2020-02-27 PROCEDURE — 3079F DIAST BP 80-89 MM HG: CPT | Mod: CPTII,S$GLB,, | Performed by: OBSTETRICS & GYNECOLOGY

## 2020-02-27 PROCEDURE — 87481 CANDIDA DNA AMP PROBE: CPT | Mod: 59

## 2020-02-27 PROCEDURE — 3079F PR MOST RECENT DIASTOLIC BLOOD PRESSURE 80-89 MM HG: ICD-10-PCS | Mod: CPTII,S$GLB,, | Performed by: OBSTETRICS & GYNECOLOGY

## 2020-02-27 PROCEDURE — 99999 PR PBB SHADOW E&M-EST. PATIENT-LVL III: CPT | Mod: PBBFAC,,, | Performed by: OBSTETRICS & GYNECOLOGY

## 2020-02-27 PROCEDURE — 87661 TRICHOMONAS VAGINALIS AMPLIF: CPT

## 2020-02-27 NOTE — PROGRESS NOTES
CC: Annual check-up    SUBJECTIVE:   49 y.o. female   for annual routine Pap and checkup. No LMP recorded..  She complains of hot flashes night sweat occasionally and no cycle for 1 yr.        Past Medical History:   Diagnosis Date    Anxiety     Depression     Hypertension     resolved    Ovarian cancer      Past Surgical History:   Procedure Laterality Date    ADENOIDECTOMY      BREAST CYST EXCISION      BREAST SURGERY      Abscess     CHOLECYSTECTOMY      CYST REMOVAL      on back    cyst removed left wrist      KNEE LIGAMENT RECONSTRUCTION Left     sleeve gastroectomy  2017    in [place]    TONSILLECTOMY      Uvuloplasty       Social History     Socioeconomic History    Marital status:      Spouse name: Not on file    Number of children: Not on file    Years of education: Not on file    Highest education level: Not on file   Occupational History     Employer: [employer]   Social Needs    Financial resource strain: Not on file    Food insecurity:     Worry: Not on file     Inability: Not on file    Transportation needs:     Medical: Not on file     Non-medical: Not on file   Tobacco Use    Smoking status: Current Every Day Smoker     Packs/day: 0.50     Types: Cigarettes     Start date: 3/7/2017    Smokeless tobacco: Never Used   Substance and Sexual Activity    Alcohol use: Yes     Alcohol/week: 2.0 standard drinks     Types: 2 Glasses of wine per week     Comment: occ    Drug use: No    Sexual activity: Yes     Partners: Male     Birth control/protection: Partner-Vasectomy     Comment:    Lifestyle    Physical activity:     Days per week: Not on file     Minutes per session: Not on file    Stress: Not on file   Relationships    Social connections:     Talks on phone: Not on file     Gets together: Not on file     Attends Sabianist service: Not on file     Active member of club or organization: Not on file     Attends meetings of clubs or  organizations: Not on file     Relationship status: Not on file   Other Topics Concern    Not on file   Social History Narrative    She teaches Band at middle school. Lives with her  and 2 children in [facility]. She has an older daughter who she gave up for adoption. Exercising.      Family History   Problem Relation Age of Onset    Arthritis Mother     Hypertension Father     Heart disease Father     Cancer Neg Hx      OB History    Para Term  AB Living   5 4 4   1 4   SAB TAB Ectopic Multiple Live Births   1       4      # Outcome Date GA Lbr Frankie/2nd Weight Sex Delivery Anes PTL Lv   5 Term 02    M Vag-Spont   ALE   4 Term 00    F Vag-Spont   ALE   3 Term 89    F Vag-Spont   ALE   2 Term         ALE   1 SAB                  Current Outpatient Medications   Medication Sig Dispense Refill    multivitamin capsule Take 1 capsule by mouth once daily.      omeprazole (PRILOSEC) 20 MG capsule Take 1 capsule (20 mg total) by mouth once daily. 30 capsule 11    venlafaxine (EFFEXOR) 37.5 MG Tab TAKE 1 TABLET BY MOUTH ONCE DAILY 90 tablet 0     No current facility-administered medications for this visit.      Allergies: Patient has no known allergies.     ROS:  Constitutional: no weight loss, weight gain, fever, fatigue  Eyes:  No vision changes, glasses/contacts  ENT/Mouth: No ulcers, sinus problems, ears ringing, headache  Cardiovascular: No inability to lie flat, chest pain, exercise intolerance, swelling, heart palpitations  Respiratory: No wheezing, coughing blood, shortness of breath, or cough  Gastrointestinal: No diarrhea, bloody stool, nausea/vomiting, constipation, gas, hemorrhoids  Genitourinary: No blood in urine, painful urination, urgency of urination, frequency of urination, incomplete emptying, incontinence, abnormal bleeding, painful periods, heavy periods, vaginal discharge, vaginal odor, painful intercourse, sexual problems, bleeding after  intercourse.  Musculoskeletal: No muscle weakness  Skin/Breast: No painful breasts, nipple discharge, masses, rash, ulcers  Neurological: No passing out, seizures, numbness, headache  Endocrine: No diabetes, hypothyroid, hyperthyroid, hot flashes, hair loss, abnormal hair growth, ance  Psychiatric: No depression, crying  Hematologic: No bruises, bleeding, swollen lymph nodes, anemia.      OBJECTIVE:   The patient appears well, alert, oriented x 3, in no distress.  /86   Wt 71.5 kg (157 lb 10.1 oz)   BMI 25.44 kg/m²   NECK: no thyromegaly, trachea midline  SKIN: no acne, striae, hirsutism  BREAST EXAM: breasts appear normal, no suspicious masses, no skin or nipple changes or axillary nodes  ABDOMEN: no hernias, masses, or hepatosplenomegaly  GENITALIA: normal external genitalia, no erythema, no discharge  URETHRA: normal urethra, normal urethral meatus  VAGINA: Normal  CERVIX: no lesions or cervical motion tenderness and abundant cx mucus  UTERUS: normal  ADNEXA: normal adnexa      ASSESSMENT:   well woman  1. Well woman exam with routine gynecological exam    2. Vaginal discharge    3. Breast cancer screening        PLAN:   mammogram  pap smear  return annually or prn  Orders Placed This Encounter    Vaginosis Screen by DNA Probe    Mammo Digital Screening Bilat w/ German    Liquid-Based Pap Smear, Screening

## 2020-02-28 LAB
BACTERIAL VAGINOSIS DNA: POSITIVE
CANDIDA GLABRATA DNA: NEGATIVE
CANDIDA KRUSEI DNA: NEGATIVE
CANDIDA RRNA VAG QL PROBE: NEGATIVE
T VAGINALIS RRNA GENITAL QL PROBE: NEGATIVE

## 2020-03-01 ENCOUNTER — TELEPHONE (OUTPATIENT)
Dept: OBSTETRICS AND GYNECOLOGY | Facility: HOSPITAL | Age: 50
End: 2020-03-01

## 2020-03-01 RX ORDER — METRONIDAZOLE 500 MG/1
500 TABLET ORAL EVERY 12 HOURS
Qty: 14 TABLET | Refills: 0 | Status: SHIPPED | OUTPATIENT
Start: 2020-03-01 | End: 2020-03-08

## 2020-03-12 ENCOUNTER — TELEPHONE (OUTPATIENT)
Dept: OBSTETRICS AND GYNECOLOGY | Facility: CLINIC | Age: 50
End: 2020-03-12

## 2020-03-12 NOTE — TELEPHONE ENCOUNTER
----- Message from Arben Firedman sent at 3/12/2020 10:51 AM CDT -----  Contact: [phone] / self   Patient is returning a call form your office. Please Advise.

## 2020-03-12 NOTE — TELEPHONE ENCOUNTER
----- Message from Vangie Corona sent at 3/12/2020 10:23 AM CDT -----  Contact: [phone]-self  Patient is returning your call.

## 2020-03-19 RX ORDER — VENLAFAXINE 37.5 MG/1
TABLET ORAL
Qty: 90 TABLET | Refills: 0 | Status: SHIPPED | OUTPATIENT
Start: 2020-03-19 | End: 2020-06-11 | Stop reason: SDUPTHER

## 2020-03-27 LAB
FINAL PATHOLOGIC DIAGNOSIS: NORMAL
Lab: NORMAL

## 2020-06-11 DIAGNOSIS — K21.9 GASTROESOPHAGEAL REFLUX DISEASE, ESOPHAGITIS PRESENCE NOT SPECIFIED: ICD-10-CM

## 2020-06-11 RX ORDER — OMEPRAZOLE 20 MG/1
20 CAPSULE, DELAYED RELEASE ORAL DAILY
Qty: 90 CAPSULE | Refills: 0 | Status: SHIPPED | OUTPATIENT
Start: 2020-06-11 | End: 2020-06-17 | Stop reason: SDUPTHER

## 2020-06-11 RX ORDER — VENLAFAXINE 37.5 MG/1
37.5 TABLET ORAL DAILY
Qty: 90 TABLET | Refills: 0 | Status: SHIPPED | OUTPATIENT
Start: 2020-06-11 | End: 2020-06-17 | Stop reason: SDUPTHER

## 2020-06-11 NOTE — TELEPHONE ENCOUNTER
----- Message from Diana Cason sent at 6/11/2020 12:20 PM CDT -----  Contact: Teresa  Type:  RX Refill Request    Who Called:  Teresa  Refill or New Rx: refill  RX Name and Strength:  omeprazole (PRILOSEC) 20 MG capsule  How is the patient currently taking it? (ex. 1XDay): 1 x day  Is this a 30 day or 90 day RX: 30 day  Preferred Pharmacy with phone number: [id number] - [place], [place] - [address] [facility] [phone] (Phone)  [phone] (Fax)  Local or Mail Order: local   Ordering Provider:   Would the patient rather a call back or a response via [facility]?  Call back  Best Call Back Number: [phone]  Additional Information:  none

## 2020-06-11 NOTE — TELEPHONE ENCOUNTER
----- Message from Diana Cason sent at 6/11/2020 12:17 PM CDT -----  Contact: Teresa  Type:  RX Refill Request    Who Called:  Teresa  Refill or New Rx: refill  RX Name and Strength:  venlafaxine (EFFEXOR) 37.5 MG Tab  How is the patient currently taking it? (ex. 1XDay): 1 x day  Is this a 30 day or 90 day RX: 90 day  Preferred Pharmacy with phone number: [id number] - [place], [place] - [address] [facility] [phone] (Phone)  [phone] (Fax)  Local or Mail Order: local   Ordering Provider:   Would the patient rather a call back or a response via [facility]?  Call back  Best Call Back Number: [phone]  Additional Information:  One pill left

## 2020-06-17 ENCOUNTER — OFFICE VISIT (OUTPATIENT)
Dept: FAMILY MEDICINE | Facility: CLINIC | Age: 50
End: 2020-06-17
Payer: COMMERCIAL

## 2020-06-17 VITALS
OXYGEN SATURATION: 98 % | HEART RATE: 61 BPM | BODY MASS INDEX: 25.71 KG/M2 | SYSTOLIC BLOOD PRESSURE: 122 MMHG | WEIGHT: 160 LBS | DIASTOLIC BLOOD PRESSURE: 84 MMHG | HEIGHT: 66 IN

## 2020-06-17 DIAGNOSIS — Z13.0 SCREENING FOR DEFICIENCY ANEMIA: ICD-10-CM

## 2020-06-17 DIAGNOSIS — F41.9 ANXIETY: Primary | ICD-10-CM

## 2020-06-17 DIAGNOSIS — Z13.220 SCREENING FOR HYPERLIPIDEMIA: ICD-10-CM

## 2020-06-17 DIAGNOSIS — K21.9 GASTROESOPHAGEAL REFLUX DISEASE, ESOPHAGITIS PRESENCE NOT SPECIFIED: ICD-10-CM

## 2020-06-17 DIAGNOSIS — Z11.4 SCREENING FOR HIV WITHOUT PRESENCE OF RISK FACTORS: ICD-10-CM

## 2020-06-17 PROCEDURE — 99214 PR OFFICE/OUTPT VISIT, EST, LEVL IV, 30-39 MIN: ICD-10-PCS | Mod: S$GLB,,, | Performed by: NURSE PRACTITIONER

## 2020-06-17 PROCEDURE — 99999 PR PBB SHADOW E&M-EST. PATIENT-LVL III: CPT | Mod: PBBFAC,,, | Performed by: NURSE PRACTITIONER

## 2020-06-17 PROCEDURE — 99999 PR PBB SHADOW E&M-EST. PATIENT-LVL III: ICD-10-PCS | Mod: PBBFAC,,, | Performed by: NURSE PRACTITIONER

## 2020-06-17 PROCEDURE — 3079F DIAST BP 80-89 MM HG: CPT | Mod: CPTII,S$GLB,, | Performed by: NURSE PRACTITIONER

## 2020-06-17 PROCEDURE — 3074F SYST BP LT 130 MM HG: CPT | Mod: CPTII,S$GLB,, | Performed by: NURSE PRACTITIONER

## 2020-06-17 PROCEDURE — 99214 OFFICE O/P EST MOD 30 MIN: CPT | Mod: S$GLB,,, | Performed by: NURSE PRACTITIONER

## 2020-06-17 PROCEDURE — 3008F BODY MASS INDEX DOCD: CPT | Mod: CPTII,S$GLB,, | Performed by: NURSE PRACTITIONER

## 2020-06-17 PROCEDURE — 3079F PR MOST RECENT DIASTOLIC BLOOD PRESSURE 80-89 MM HG: ICD-10-PCS | Mod: CPTII,S$GLB,, | Performed by: NURSE PRACTITIONER

## 2020-06-17 PROCEDURE — 3074F PR MOST RECENT SYSTOLIC BLOOD PRESSURE < 130 MM HG: ICD-10-PCS | Mod: CPTII,S$GLB,, | Performed by: NURSE PRACTITIONER

## 2020-06-17 PROCEDURE — 3008F PR BODY MASS INDEX (BMI) DOCUMENTED: ICD-10-PCS | Mod: CPTII,S$GLB,, | Performed by: NURSE PRACTITIONER

## 2020-06-17 RX ORDER — VENLAFAXINE 37.5 MG/1
37.5 TABLET ORAL DAILY
Qty: 90 TABLET | Refills: 0 | Status: SHIPPED | OUTPATIENT
Start: 2020-06-17 | End: 2021-02-03 | Stop reason: SDUPTHER

## 2020-06-17 RX ORDER — OMEPRAZOLE 20 MG/1
20 CAPSULE, DELAYED RELEASE ORAL DAILY
Qty: 90 CAPSULE | Refills: 0 | Status: SHIPPED | OUTPATIENT
Start: 2020-06-17 | End: 2021-02-04

## 2020-06-17 NOTE — PROGRESS NOTES
Subjective:       Patient ID: Teresa Valdes is a 49 y.o. female.    Chief Complaint: Follow-up    50 y/o female presents to clinic for follow up and mediation refills. Patient is new to me; previous patient of Dr. Hernandez.     Patient has anxiety and GERD. She is taking venlafaxine and omeprazole daily. States medications are working well to control symptoms. No other concerning issues.     Health maintenance  -Mammogram due: to be scheduled today  -Pneumococcal vaccine due: patient declined      Current Outpatient Medications   Medication Sig Dispense Refill    multivitamin capsule Take 1 capsule by mouth once daily.      omeprazole (PRILOSEC) 20 MG capsule Take 1 capsule (20 mg total) by mouth once daily. 90 capsule 0    venlafaxine (EFFEXOR) 37.5 MG Tab Take 1 tablet (37.5 mg total) by mouth once daily. 90 tablet 0     No current facility-administered medications for this visit.        Past Medical History:   Diagnosis Date    Anxiety     Depression     Hypertension     resolved    Ovarian cancer        Past Surgical History:   Procedure Laterality Date    ADENOIDECTOMY      BREAST CYST EXCISION      BREAST SURGERY      Abscess     CHOLECYSTECTOMY      CYST REMOVAL      on back    cyst removed left wrist      KNEE LIGAMENT RECONSTRUCTION Left     sleeve gastro[place]  06/2017    in [place]    TONSILLECTOMY      Uvuloplasty         Family History   Problem Relation Age of Onset    Arthritis Mother     Hypertension Father     Heart disease Father     Cancer Neg Hx        Social History     Socioeconomic History    Marital status:      Spouse name: Not on file    Number of children: Not on file    Years of education: Not on file    Highest education level: Not on file   Occupational History     Employer: [employer]   Social Needs    Financial resource strain: Not on file    Food insecurity     Worry: Not on file     Inability: Not on file    Transportation needs      Medical: Not on file     Non-medical: Not on file   Tobacco Use    Smoking status: Current Every Day Smoker     Packs/day: 0.50     Types: Cigarettes     Start date: 3/7/2017    Smokeless tobacco: Never Used   Substance and Sexual Activity    Alcohol use: Yes     Alcohol/week: 2.0 standard drinks     Types: 2 Glasses of wine per week     Comment: occ    Drug use: No    Sexual activity: Yes     Partners: Male     Birth control/protection: Partner-Vasectomy     Comment:    Lifestyle    Physical activity     Days per week: Not on file     Minutes per session: Not on file    Stress: Not on file   Relationships    Social connections     Talks on phone: Not on file     Gets together: Not on file     Attends Restorationist service: Not on file     Active member of club or organization: Not on file     Attends meetings of clubs or organizations: Not on file     Relationship status: Not on file   Other Topics Concern    Not on file   Social History Narrative    She teaches Band at middle school. Lives with her  and 2 children in [facility]. She has an older daughter who she gave up for adoption. Exercising.        Review of Systems   Constitutional: Negative for fatigue, fever and unexpected weight change.   HENT: Negative for nosebleeds and sore throat.    Eyes: Negative for visual disturbance.   Respiratory: Negative for cough and shortness of breath.    Cardiovascular: Negative for chest pain and palpitations.   Gastrointestinal: Negative for abdominal pain.   Genitourinary: Negative for dysuria.   Musculoskeletal: Negative for back pain.   Allergic/Immunologic: Negative for immunocompromised state.   Neurological: Negative for weakness and headaches.   Hematological: Negative for adenopathy.   Psychiatric/Behavioral: Negative for dysphoric mood.         Objective:     Vitals:    06/17/20 1055   BP: 122/84   BP Location: Left arm   Patient Position: Sitting   BP Method: Medium (Manual)  "  Pulse: 61   SpO2: 98%   Weight: 72.6 kg (160 lb)   Height: 5' 6" (1.676 m)          Physical Exam  Constitutional:       Appearance: Normal appearance. She is well-developed. She is not ill-appearing.   HENT:      Head: Normocephalic.      Nose: Nose normal.      Mouth/Throat:      Mouth: Mucous membranes are moist.      Pharynx: Oropharynx is clear.   Eyes:      Conjunctiva/sclera: Conjunctivae normal.      Pupils: Pupils are equal, round, and reactive to light.   Neck:      Musculoskeletal: Normal range of motion and neck supple.   Cardiovascular:      Rate and Rhythm: Normal rate and regular rhythm.      Heart sounds: Normal heart sounds.   Pulmonary:      Effort: Pulmonary effort is normal.      Breath sounds: Normal breath sounds.   Abdominal:      General: Bowel sounds are normal.      Palpations: Abdomen is soft.      Tenderness: There is no abdominal tenderness.   Musculoskeletal: Normal range of motion.   Skin:     General: Skin is warm and dry.   Neurological:      Mental Status: She is alert and oriented to person, place, and time.   Psychiatric:         Mood and Affect: Mood normal.         Thought Content: Thought content normal.           Assessment:         ICD-10-CM ICD-9-CM   1. Anxiety  F41.9 300.00   2. Gastroesophageal reflux disease, esophagitis presence not specified  K21.9 530.81   3. Screening for hyperlipidemia  Z13.220 V77.91   4. Screening for deficiency anemia  Z13.0 V78.1   5. Screening for HIV without presence of risk factors  Z11.4 V73.89       Plan:       Anxiety  -     venlafaxine (EFFEXOR) 37.5 MG Tab; Take 1 tablet (37.5 mg total) by mouth once daily.  Dispense: 90 tablet; Refill: 0    Gastroesophageal reflux disease, esophagitis presence not specified  -     omeprazole (PRILOSEC) 20 MG capsule; Take 1 capsule (20 mg total) by mouth once daily.  Dispense: 90 capsule; Refill: 0    Screening for hyperlipidemia  -     Lipid Panel; Future; Expected date: 12/17/2020    Screening for " deficiency anemia  -     Basic metabolic panel; Future; Expected date: 06/17/2020  -     CBC auto differential; Future; Expected date: 06/17/2020    Screening for HIV without presence of risk factors  -     HIV 1/2 Ag/Ab (4th Gen); Future; Expected date: 06/17/2020      Follow up in about 6 months (around 12/17/2020) for medication management.     Patient's Medications   New Prescriptions    No medications on file   Previous Medications    MULTIVITAMIN CAPSULE    Take 1 capsule by mouth once daily.   Modified Medications    Modified Medication Previous Medication    OMEPRAZOLE (PRILOSEC) 20 MG CAPSULE omeprazole (PRILOSEC) 20 MG capsule       Take 1 capsule (20 mg total) by mouth once daily.    Take 1 capsule (20 mg total) by mouth once daily.    VENLAFAXINE (EFFEXOR) 37.5 MG TAB venlafaxine (EFFEXOR) 37.5 MG Tab       Take 1 tablet (37.5 mg total) by mouth once daily.    Take 1 tablet (37.5 mg total) by mouth once daily.   Discontinued Medications    No medications on file

## 2020-06-26 ENCOUNTER — TELEPHONE (OUTPATIENT)
Dept: FAMILY MEDICINE | Facility: CLINIC | Age: 50
End: 2020-06-26

## 2020-06-26 NOTE — TELEPHONE ENCOUNTER
Spoke with pt. Notified her that results were normal and released to [facility].

## 2020-12-10 ENCOUNTER — OFFICE VISIT (OUTPATIENT)
Dept: URGENT CARE | Facility: CLINIC | Age: 50
End: 2020-12-10
Payer: COMMERCIAL

## 2020-12-10 VITALS
OXYGEN SATURATION: 99 % | HEART RATE: 58 BPM | RESPIRATION RATE: 16 BRPM | BODY MASS INDEX: 25.11 KG/M2 | SYSTOLIC BLOOD PRESSURE: 139 MMHG | TEMPERATURE: 98 F | DIASTOLIC BLOOD PRESSURE: 84 MMHG | HEIGHT: 67 IN | WEIGHT: 160 LBS

## 2020-12-10 DIAGNOSIS — Z20.822 CLOSE EXPOSURE TO COVID-19 VIRUS: Primary | ICD-10-CM

## 2020-12-10 LAB
CTP QC/QA: YES
SARS-COV-2 RDRP RESP QL NAA+PROBE: NEGATIVE

## 2020-12-10 PROCEDURE — 3008F BODY MASS INDEX DOCD: CPT | Mod: CPTII,S$GLB,, | Performed by: NURSE PRACTITIONER

## 2020-12-10 PROCEDURE — 99214 OFFICE O/P EST MOD 30 MIN: CPT | Mod: S$GLB,CS,, | Performed by: NURSE PRACTITIONER

## 2020-12-10 PROCEDURE — U0002: ICD-10-PCS | Mod: QW,S$GLB,, | Performed by: NURSE PRACTITIONER

## 2020-12-10 PROCEDURE — 3008F PR BODY MASS INDEX (BMI) DOCUMENTED: ICD-10-PCS | Mod: CPTII,S$GLB,, | Performed by: NURSE PRACTITIONER

## 2020-12-10 PROCEDURE — U0002 COVID-19 LAB TEST NON-CDC: HCPCS | Mod: QW,S$GLB,, | Performed by: NURSE PRACTITIONER

## 2020-12-10 PROCEDURE — 99214 PR OFFICE/OUTPT VISIT, EST, LEVL IV, 30-39 MIN: ICD-10-PCS | Mod: S$GLB,CS,, | Performed by: NURSE PRACTITIONER

## 2020-12-10 NOTE — PATIENT INSTRUCTIONS
"Highly Recommend you take Vitamin C & D and Zinc to enhance immune system!    You have tested NEGATIVE for COVID-19 today. If you did not have a close exposure (as defined below) you can return to your normal daily activities to include social distancing, wearing a mask and frequent handwashing. A "close exposure" is defined as anyone who has had an exposure (masked or unmasked) to a known COVID -19 positive person within 6 ft for longer than 15 minutes. If your exposure meets this definition, you are required by CDC guidelines to quarantine for at least 7-10 days from time of exposure. The CDC states that a test can be performed for an asymptomatic patient (someone who does not have any symptoms) after a close exposure, and that a test should be done if you develop symptoms after a close exposure as described above. Specifically, you can test at day 5 or later if asymptomatic in order to get released from quarantine on day 7 or later. If you develop symptoms sooner, you should test when your symptoms start. If you developed symptoms since the exposure, and your test was negative today and less than 5 days from your exposure, you still have to quarantine for 7-10 days from the date of the exposure. The 7-10 day quarantine begins from the day you were exposed, not the day of your test. For example, if your exposure was on a Monday, and you waited until Friday of the same week to get tested and it was negative, your 7-10 day quarantine begins from that Monday, not the Friday you tested negative. Please note, if you decide to test as an asymptomatic during your quarantine and you are positive, you will be restarting your quarantine and moving from a possible 10 day quarantine (if you do not test), to a 11 day or greater quarantine.     Separate yourself from other people and animals in your home.   Call ahead before visiting your doctor.   Wear a facemask.   Cover your coughs and sneezes.   Wash your hands often " with soap and water; hand  can be used, too.   Avoid sharing personal household items.   Wipe down surfaces used daily.   Monitor your symptoms. Seek prompt medical attention if your illness is worsening (e.g., difficulty breathing).    Before seeking care, call your healthcare provider.   If you have a medical emergency and need to call 911, notify the dispatch personnel that you have, or are being evaluated for COVID-19. If possible, put on a facemask before emergency medical services arrive.          Recommended precautions for household members, intimate partners, and caregivers in a home setting of a patient with symptomatic laboratory-confirmed COVID-19 or a patient under investigation.  Household members, intimate partners, and caregivers in the home setting awaiting tests results have close contact with a person with symptomatic, laboratory-confirmed COVID-19 or a person under investigation. Close contacts should monitor their health; they should call their provider right away if they develop symptoms suggestive of COVID-19 (e.g., fever, cough, shortness of breath).    Close contacts should also follow these recommendations:   Make sure that you understand and can help the patient follow their provider's instructions for medication(s) and care. You should help the patient with basic needs in the home and provide support for getting groceries, prescriptions, and other personal needs.   Monitor the patient's symptoms. If the patient is getting sicker, call his or her healthcare provider and tell them that the patient has laboratory-confirmed COVID-19. If the patient has a medical emergency and you need to call 911, notify the dispatch personnel that the patient has, or is being evaluated for COVID-19.   Household members should stay in another room or be  from the patient. Household members should use a separate bedroom and bathroom, if available.   Prohibit visitors.   Household  members should care for any pets in the home.   Make sure that shared spaces in the home have good air flow, such as by an air conditioner or an opened window, weather permitting.   Perform hand hygiene frequently. Wash your hands often with soap and water for at least 20 seconds or use an alcohol-based hand  (that contains > 60% alcohol) covering all surfaces of your hands and rubbing them together until they feel dry. Soap and water should be used preferentially.   Avoid touching your eyes, nose, and mouth.   The patient should wear a facemask. If the patient is not able to wear a facemask (for example, because it causes trouble breathing), caregivers should wear a mask when they are in the same room as the patient.   Wear a disposable facemask and gloves when you touch or have contact with the patient's blood, stool, or body fluids, such as saliva, sputum, nasal mucus, vomit, urine.  o Throw out disposable facemasks and gloves after using them. Do not reuse.  o When removing personal protective equipment, first remove and dispose of gloves. Then, immediately clean your hands with soap and water or alcohol-based hand . Next, remove and dispose of facemask, and immediately clean your hands again with soap and water or alcohol-based hand .   You should not share dishes, drinking glasses, cups, eating utensils, towels, bedding, or other items with the patient. After the patient uses these items, you should wash them thoroughly (see below Wash laundry thoroughly).   Clean all high-touch surfaces, such as counters, tabletops, doorknobs, bathroom fixtures, toilets, phones, keyboards, tablets, and bedside tables, every day. Also, clean any surfaces that may have blood, stool, or body fluids on them.   Use a household cleaning spray or wipe, according to the label instructions. Labels contain instructions for safe and effective use of the cleaning product including precautions you  should take when applying the product, such as wearing gloves and making sure you have good ventilation during use of the product.   Wash laundry thoroughly.  o Immediately remove and wash clothes or bedding that have blood, stool, or body fluids on them.  o Wear disposable gloves while handling soiled items and keep soiled items away from your body. Clean your hands (with soap and water or an alcohol-based hand ) immediately after removing your gloves.  o Read and follow directions on labels of laundry or clothing items and detergent. In general, using a normal laundry detergent according to washing machine instructions and dry thoroughly using the warmest temperatures recommended on the clothing label.   Place all used disposable gloves, facemasks, and other contaminated items in a lined container before disposing of them with other household waste. Clean your hands (with soap and water or an alcohol-based hand ) immediately after handling these items. Soap and water should be used preferentially if hands are visibly dirty.   Discuss any additional questions with your state or local health department or healthcare provider. Check available hours when contacting your local health department.    For more information see CDC link below.      https://www.cdc.gov/coronavirus/2019-ncov/hcp/guidance-prevent-spread.html#precautions        Sources:  CDC, [place] [facility] and [facility]    If you were prescribed a narcotic or controlled medication, do not drive or operate heavy equipment or machinery while taking these medications.  You must understand that you've received an Urgent Care treatment only and that you may be released before all your medical problems are known or treated. You, the patient, will arrange for follow up care as instructed.  Follow up with your PCP or specialty clinic as directed within 2-5 days if not improved or as needed.  You can call [phone] to schedule  an appointment with the appropriate provider.  If your condition worsens we recommend that you receive another evaluation at the emergency room immediately or contact your primary medical clinics after hours call service to discuss your concerns.  Please return here or go to the Emergency Department for any concerns or worsening of condition.

## 2020-12-10 NOTE — PROGRESS NOTES
"Subjective:       Patient ID: Teresa Valdes is a 50 y.o. female.    Vitals:  height is 5' 7" (1.702 m) and weight is 72.6 kg (160 lb). Her temporal temperature is 97.8 °F (36.6 °C). Her blood pressure is 139/84 and her pulse is 58 (abnormal). Her respiration is 16 and oxygen saturation is 99%.     Chief Complaint: COVID-19 Concerns    50 yr old female presents to the Urgent Care with Covid exposure x 5 days ago. Patient asymptomatic at this time.    Other  This is a new problem. The current episode started today. The problem occurs constantly. The problem has been unchanged. Pertinent negatives include no abdominal pain, anorexia, arthralgias, change in bowel habit, chest pain, chills, congestion, coughing, diaphoresis, fatigue, fever, headaches, joint swelling, myalgias, nausea, neck pain, numbness, rash, sore throat, swollen glands, urinary symptoms, vertigo, visual change, vomiting or weakness. Nothing aggravates the symptoms. She has tried nothing for the symptoms.       Constitution: Negative for chills, sweating, fatigue and fever.   HENT: Negative for congestion and sore throat.    Neck: Negative for neck pain and painful lymph nodes.   Cardiovascular: Negative for chest pain, leg swelling and sob on exertion.   Eyes: Negative for double vision and blurred vision.   Respiratory: Negative for cough and shortness of breath.    Gastrointestinal: Negative for abdominal pain, nausea, vomiting and diarrhea.   Genitourinary: Negative for dysuria, frequency, urgency and history of kidney stones.   Musculoskeletal: Negative for joint pain, joint swelling, muscle cramps and muscle ache.   Skin: Negative for color change, pale, rash and bruising.   Allergic/Immunologic: Negative for seasonal allergies.   Neurological: Negative for dizziness, history of vertigo, light-headedness, passing out, headaches and numbness.   Hematologic/Lymphatic: Negative for swollen lymph nodes.   Psychiatric/Behavioral: Negative for " "nervous/anxious, sleep disturbance and depression. The patient is not nervous/anxious.        Objective:      Physical Exam   Constitutional: She appears well-developed. She is cooperative.  Non-toxic appearance. She does not appear ill. No distress.   HENT:   Ears:   Right Ear: Abnromal external ear normal.   Left Ear: Abnormal external ear normal.   Cardiovascular: Normal rate and normal pulses.   Pulses:       Radial pulses are 2+ on the right side and 2+ on the left side.   Pulmonary/Chest: Effort normal.   Abdominal: Normal appearance.   Neurological: She is alert. Gait normal.   Skin: Skin is not diaphoretic. Psychiatric: Her speech is normal and behavior is normal. Mood and thought content normal.   Nursing note and vitals reviewed.        Assessment:       1. Close exposure to COVID-19 virus        Results for orders placed or performed in visit on 12/10/20   POCT COVID-19 Rapid Screening   Result Value Ref Range    POC Rapid COVID Negative Negative     Acceptable Yes        Plan:         Close exposure to COVID-19 virus  -     POCT COVID-19 Rapid Screening      Patient Instructions   Highly Recommend you take Vitamin C & D and Zinc to enhance immune system!    You have tested NEGATIVE for COVID-19 today. If you did not have a close exposure (as defined below) you can return to your normal daily activities to include social distancing, wearing a mask and frequent handwashing. A "close exposure" is defined as anyone who has had an exposure (masked or unmasked) to a known COVID -19 positive person within 6 ft for longer than 15 minutes. If your exposure meets this definition, you are required by CDC guidelines to quarantine for at least 7-10 days from time of exposure. The CDC states that a test can be performed for an asymptomatic patient (someone who does not have any symptoms) after a close exposure, and that a test should be done if you develop symptoms after a close exposure as described " above. Specifically, you can test at day 5 or later if asymptomatic in order to get released from quarantine on day 7 or later. If you develop symptoms sooner, you should test when your symptoms start. If you developed symptoms since the exposure, and your test was negative today and less than 5 days from your exposure, you still have to quarantine for 7-10 days from the date of the exposure. The 7-10 day quarantine begins from the day you were exposed, not the day of your test. For example, if your exposure was on a Monday, and you waited until Friday of the same week to get tested and it was negative, your 7-10 day quarantine begins from that Monday, not the Friday you tested negative. Please note, if you decide to test as an asymptomatic during your quarantine and you are positive, you will be restarting your quarantine and moving from a possible 10 day quarantine (if you do not test), to a 11 day or greater quarantine.     Separate yourself from other people and animals in your home.   Call ahead before visiting your doctor.   Wear a facemask.   Cover your coughs and sneezes.   Wash your hands often with soap and water; hand  can be used, too.   Avoid sharing personal household items.   Wipe down surfaces used daily.   Monitor your symptoms. Seek prompt medical attention if your illness is worsening (e.g., difficulty breathing).    Before seeking care, call your healthcare provider.   If you have a medical emergency and need to call 911, notify the dispatch personnel that you have, or are being evaluated for COVID-19. If possible, put on a facemask before emergency medical services arrive.          Recommended precautions for household members, intimate partners, and caregivers in a home setting of a patient with symptomatic laboratory-confirmed COVID-19 or a patient under investigation.  Household members, intimate partners, and caregivers in the home setting awaiting tests results have close  contact with a person with symptomatic, laboratory-confirmed COVID-19 or a person under investigation. Close contacts should monitor their health; they should call their provider right away if they develop symptoms suggestive of COVID-19 (e.g., fever, cough, shortness of breath).    Close contacts should also follow these recommendations:   Make sure that you understand and can help the patient follow their provider's instructions for medication(s) and care. You should help the patient with basic needs in the home and provide support for getting groceries, prescriptions, and other personal needs.   Monitor the patient's symptoms. If the patient is getting sicker, call his or her healthcare provider and tell them that the patient has laboratory-confirmed COVID-19. If the patient has a medical emergency and you need to call 911, notify the dispatch personnel that the patient has, or is being evaluated for COVID-19.   Household members should stay in another room or be  from the patient. Household members should use a separate bedroom and bathroom, if available.   Prohibit visitors.   Household members should care for any pets in the home.   Make sure that shared spaces in the home have good air flow, such as by an air conditioner or an opened window, weather permitting.   Perform hand hygiene frequently. Wash your hands often with soap and water for at least 20 seconds or use an alcohol-based hand  (that contains > 60% alcohol) covering all surfaces of your hands and rubbing them together until they feel dry. Soap and water should be used preferentially.   Avoid touching your eyes, nose, and mouth.   The patient should wear a facemask. If the patient is not able to wear a facemask (for example, because it causes trouble breathing), caregivers should wear a mask when they are in the same room as the patient.   Wear a disposable facemask and gloves when you touch or have contact with the  patient's blood, stool, or body fluids, such as saliva, sputum, nasal mucus, vomit, urine.  o Throw out disposable facemasks and gloves after using them. Do not reuse.  o When removing personal protective equipment, first remove and dispose of gloves. Then, immediately clean your hands with soap and water or alcohol-based hand . Next, remove and dispose of facemask, and immediately clean your hands again with soap and water or alcohol-based hand .   You should not share dishes, drinking glasses, cups, eating utensils, towels, bedding, or other items with the patient. After the patient uses these items, you should wash them thoroughly (see below Wash laundry thoroughly).   Clean all high-touch surfaces, such as counters, tabletops, doorknobs, bathroom fixtures, toilets, phones, keyboards, tablets, and bedside tables, every day. Also, clean any surfaces that may have blood, stool, or body fluids on them.   Use a household cleaning spray or wipe, according to the label instructions. Labels contain instructions for safe and effective use of the cleaning product including precautions you should take when applying the product, such as wearing gloves and making sure you have good ventilation during use of the product.   Wash laundry thoroughly.  o Immediately remove and wash clothes or bedding that have blood, stool, or body fluids on them.  o Wear disposable gloves while handling soiled items and keep soiled items away from your body. Clean your hands (with soap and water or an alcohol-based hand ) immediately after removing your gloves.  o Read and follow directions on labels of laundry or clothing items and detergent. In general, using a normal laundry detergent according to washing machine instructions and dry thoroughly using the warmest temperatures recommended on the clothing label.   Place all used disposable gloves, facemasks, and other contaminated items in a lined container  before disposing of them with other household waste. Clean your hands (with soap and water or an alcohol-based hand ) immediately after handling these items. Soap and water should be used preferentially if hands are visibly dirty.   Discuss any additional questions with your state or local health department or healthcare provider. Check available hours when contacting your local health department.    For more information see CDC link below.      https://www.cdc.gov/coronavirus/2019-ncov/hcp/guidance-prevent-spread.html#precautions        Sources:  [facility], [place] Department of [facility] and [facility]    If you were prescribed a narcotic or controlled medication, do not drive or operate heavy equipment or machinery while taking these medications.  You must understand that you've received an Urgent Care treatment only and that you may be released before all your medical problems are known or treated. You, the patient, will arrange for follow up care as instructed.  Follow up with your PCP or specialty clinic as directed within 2-5 days if not improved or as needed.  You can call [phone] to schedule an appointment with the appropriate provider.  If your condition worsens we recommend that you receive another evaluation at the emergency room immediately or contact your primary medical clinics after hours call service to discuss your concerns.  Please return here or go to the Emergency Department for any concerns or worsening of condition.

## 2021-01-04 ENCOUNTER — OFFICE VISIT (OUTPATIENT)
Dept: URGENT CARE | Facility: CLINIC | Age: 51
End: 2021-01-04
Payer: COMMERCIAL

## 2021-01-04 VITALS
HEART RATE: 57 BPM | HEIGHT: 66 IN | WEIGHT: 160 LBS | BODY MASS INDEX: 25.71 KG/M2 | DIASTOLIC BLOOD PRESSURE: 77 MMHG | RESPIRATION RATE: 18 BRPM | TEMPERATURE: 98 F | OXYGEN SATURATION: 99 % | SYSTOLIC BLOOD PRESSURE: 126 MMHG

## 2021-01-04 DIAGNOSIS — B34.9 VIRAL SYNDROME: Primary | ICD-10-CM

## 2021-01-04 LAB
CTP QC/QA: YES
SARS-COV-2 RDRP RESP QL NAA+PROBE: NEGATIVE

## 2021-01-04 PROCEDURE — 99214 PR OFFICE/OUTPT VISIT, EST, LEVL IV, 30-39 MIN: ICD-10-PCS | Mod: S$GLB,,, | Performed by: PHYSICIAN ASSISTANT

## 2021-01-04 PROCEDURE — U0002 COVID-19 LAB TEST NON-CDC: HCPCS | Mod: QW,S$GLB,, | Performed by: PHYSICIAN ASSISTANT

## 2021-01-04 PROCEDURE — U0002: ICD-10-PCS | Mod: QW,S$GLB,, | Performed by: PHYSICIAN ASSISTANT

## 2021-01-04 PROCEDURE — 3008F PR BODY MASS INDEX (BMI) DOCUMENTED: ICD-10-PCS | Mod: CPTII,S$GLB,, | Performed by: PHYSICIAN ASSISTANT

## 2021-01-04 PROCEDURE — 99214 OFFICE O/P EST MOD 30 MIN: CPT | Mod: S$GLB,,, | Performed by: PHYSICIAN ASSISTANT

## 2021-01-04 PROCEDURE — 3008F BODY MASS INDEX DOCD: CPT | Mod: CPTII,S$GLB,, | Performed by: PHYSICIAN ASSISTANT

## 2021-02-03 DIAGNOSIS — F41.9 ANXIETY: ICD-10-CM

## 2021-02-03 RX ORDER — VENLAFAXINE 37.5 MG/1
TABLET ORAL
Qty: 90 TABLET | Refills: 0 | OUTPATIENT
Start: 2021-02-03

## 2021-02-03 RX ORDER — VENLAFAXINE 37.5 MG/1
37.5 TABLET ORAL DAILY
Qty: 90 TABLET | Refills: 0 | Status: SHIPPED | OUTPATIENT
Start: 2021-02-03 | End: 2021-09-28 | Stop reason: SDUPTHER

## 2021-02-04 PROBLEM — F32.A DEPRESSION: Status: ACTIVE | Noted: 2021-02-04

## 2021-02-04 PROBLEM — L72.3 SEBACEOUS CYST: Status: RESOLVED | Noted: 2018-04-02 | Resolved: 2021-02-04

## 2021-02-04 PROBLEM — F32.A DEPRESSION: Status: RESOLVED | Noted: 2021-02-04 | Resolved: 2021-02-04

## 2021-08-20 ENCOUNTER — CLINICAL SUPPORT (OUTPATIENT)
Dept: OTHER | Facility: CLINIC | Age: 51
End: 2021-08-20
Payer: COMMERCIAL

## 2021-08-20 DIAGNOSIS — Z00.8 ENCOUNTER FOR OTHER GENERAL EXAMINATION: ICD-10-CM

## 2021-08-21 VITALS — HEIGHT: 66 IN | BODY MASS INDEX: 25.82 KG/M2

## 2021-08-21 LAB
GLUCOSE SERPL-MCNC: 99 MG/DL (ref 60–140)
HDLC SERPL-MCNC: 64 MG/DL
POC CHOLESTEROL, LDL (DOCK): 58 MG/DL
POC CHOLESTEROL, TOTAL: 144 MG/DL
TRIGL SERPL-MCNC: 109 MG/DL

## 2021-09-26 DIAGNOSIS — F41.9 ANXIETY: ICD-10-CM

## 2021-09-27 RX ORDER — VENLAFAXINE 37.5 MG/1
37.5 TABLET ORAL DAILY
Qty: 90 TABLET | Refills: 0 | OUTPATIENT
Start: 2021-09-27

## 2021-09-28 ENCOUNTER — OFFICE VISIT (OUTPATIENT)
Dept: FAMILY MEDICINE | Facility: CLINIC | Age: 51
End: 2021-09-28
Payer: COMMERCIAL

## 2021-09-28 VITALS
SYSTOLIC BLOOD PRESSURE: 110 MMHG | DIASTOLIC BLOOD PRESSURE: 80 MMHG | HEART RATE: 75 BPM | HEIGHT: 66 IN | OXYGEN SATURATION: 99 % | WEIGHT: 155.38 LBS | BODY MASS INDEX: 24.97 KG/M2 | RESPIRATION RATE: 18 BRPM | TEMPERATURE: 98 F

## 2021-09-28 DIAGNOSIS — Z00.00 GENERAL MEDICAL EXAM: Primary | ICD-10-CM

## 2021-09-28 DIAGNOSIS — F41.9 ANXIETY: ICD-10-CM

## 2021-09-28 PROCEDURE — 3074F SYST BP LT 130 MM HG: CPT | Mod: CPTII,S$GLB,, | Performed by: FAMILY MEDICINE

## 2021-09-28 PROCEDURE — 3079F PR MOST RECENT DIASTOLIC BLOOD PRESSURE 80-89 MM HG: ICD-10-PCS | Mod: CPTII,S$GLB,, | Performed by: FAMILY MEDICINE

## 2021-09-28 PROCEDURE — 3079F DIAST BP 80-89 MM HG: CPT | Mod: CPTII,S$GLB,, | Performed by: FAMILY MEDICINE

## 2021-09-28 PROCEDURE — 1159F PR MEDICATION LIST DOCUMENTED IN MEDICAL RECORD: ICD-10-PCS | Mod: CPTII,S$GLB,, | Performed by: FAMILY MEDICINE

## 2021-09-28 PROCEDURE — 3008F PR BODY MASS INDEX (BMI) DOCUMENTED: ICD-10-PCS | Mod: CPTII,S$GLB,, | Performed by: FAMILY MEDICINE

## 2021-09-28 PROCEDURE — 99214 PR OFFICE/OUTPT VISIT, EST, LEVL IV, 30-39 MIN: ICD-10-PCS | Mod: S$GLB,,, | Performed by: FAMILY MEDICINE

## 2021-09-28 PROCEDURE — 3074F PR MOST RECENT SYSTOLIC BLOOD PRESSURE < 130 MM HG: ICD-10-PCS | Mod: CPTII,S$GLB,, | Performed by: FAMILY MEDICINE

## 2021-09-28 PROCEDURE — 3008F BODY MASS INDEX DOCD: CPT | Mod: CPTII,S$GLB,, | Performed by: FAMILY MEDICINE

## 2021-09-28 PROCEDURE — 1159F MED LIST DOCD IN RCRD: CPT | Mod: CPTII,S$GLB,, | Performed by: FAMILY MEDICINE

## 2021-09-28 PROCEDURE — 99999 PR PBB SHADOW E&M-EST. PATIENT-LVL III: CPT | Mod: PBBFAC,,, | Performed by: FAMILY MEDICINE

## 2021-09-28 PROCEDURE — 99999 PR PBB SHADOW E&M-EST. PATIENT-LVL III: ICD-10-PCS | Mod: PBBFAC,,, | Performed by: FAMILY MEDICINE

## 2021-09-28 PROCEDURE — 99214 OFFICE O/P EST MOD 30 MIN: CPT | Mod: S$GLB,,, | Performed by: FAMILY MEDICINE

## 2021-09-28 RX ORDER — VENLAFAXINE 37.5 MG/1
37.5 TABLET ORAL DAILY
Qty: 90 TABLET | Refills: 0 | Status: SHIPPED | OUTPATIENT
Start: 2021-09-28 | End: 2022-02-13 | Stop reason: SDUPTHER

## 2021-12-01 DIAGNOSIS — Z12.31 OTHER SCREENING MAMMOGRAM: ICD-10-CM

## 2022-01-10 ENCOUNTER — PATIENT MESSAGE (OUTPATIENT)
Dept: ADMINISTRATIVE | Facility: HOSPITAL | Age: 52
End: 2022-01-10
Payer: COMMERCIAL

## 2022-02-15 DIAGNOSIS — F41.9 ANXIETY: ICD-10-CM

## 2022-02-15 RX ORDER — VENLAFAXINE 37.5 MG/1
37.5 TABLET ORAL DAILY
Qty: 90 TABLET | Refills: 0 | Status: SHIPPED | OUTPATIENT
Start: 2022-02-15 | End: 2022-07-08

## 2022-02-15 NOTE — TELEPHONE ENCOUNTER
No new care gaps identified.  Powered by [employer]. Reference number: [id number].   2/15/2022 8:39:10 AM CST

## 2022-03-21 ENCOUNTER — PATIENT MESSAGE (OUTPATIENT)
Dept: ADMINISTRATIVE | Facility: HOSPITAL | Age: 52
End: 2022-03-21
Payer: COMMERCIAL

## 2022-05-31 ENCOUNTER — PATIENT MESSAGE (OUTPATIENT)
Dept: ADMINISTRATIVE | Facility: HOSPITAL | Age: 52
End: 2022-05-31
Payer: COMMERCIAL

## 2022-09-07 ENCOUNTER — CLINICAL SUPPORT (OUTPATIENT)
Dept: OTHER | Facility: CLINIC | Age: 52
End: 2022-09-07
Payer: COMMERCIAL

## 2022-09-07 DIAGNOSIS — Z00.8 ENCOUNTER FOR OTHER GENERAL EXAMINATION: ICD-10-CM

## 2022-09-08 VITALS
WEIGHT: 169 LBS | SYSTOLIC BLOOD PRESSURE: 139 MMHG | DIASTOLIC BLOOD PRESSURE: 88 MMHG | HEIGHT: 66 IN | BODY MASS INDEX: 27.16 KG/M2

## 2022-09-08 LAB
GLUCOSE SERPL-MCNC: 94 MG/DL (ref 60–140)
HDLC SERPL-MCNC: 70 MG/DL
POC CHOLESTEROL, LDL (DOCK): 45 MG/DL
POC CHOLESTEROL, TOTAL: 145 MG/DL
TRIGL SERPL-MCNC: 192 MG/DL

## 2023-03-07 ENCOUNTER — OFFICE VISIT (OUTPATIENT)
Dept: FAMILY MEDICINE | Facility: CLINIC | Age: 53
End: 2023-03-07
Payer: COMMERCIAL

## 2023-03-07 VITALS
SYSTOLIC BLOOD PRESSURE: 118 MMHG | WEIGHT: 171.13 LBS | HEART RATE: 66 BPM | BODY MASS INDEX: 27.5 KG/M2 | DIASTOLIC BLOOD PRESSURE: 82 MMHG | OXYGEN SATURATION: 100 % | HEIGHT: 66 IN

## 2023-03-07 DIAGNOSIS — F41.9 ANXIETY AND DEPRESSION: ICD-10-CM

## 2023-03-07 DIAGNOSIS — F32.A ANXIETY AND DEPRESSION: ICD-10-CM

## 2023-03-07 DIAGNOSIS — Z13.228 ENCOUNTER FOR SCREENING FOR METABOLIC DISORDER: ICD-10-CM

## 2023-03-07 DIAGNOSIS — Z12.11 ENCOUNTER FOR COLORECTAL CANCER SCREENING: ICD-10-CM

## 2023-03-07 DIAGNOSIS — M21.612 BUNION OF GREAT TOE OF LEFT FOOT: ICD-10-CM

## 2023-03-07 DIAGNOSIS — Z12.12 ENCOUNTER FOR COLORECTAL CANCER SCREENING: ICD-10-CM

## 2023-03-07 DIAGNOSIS — Z11.59 NEED FOR HEPATITIS C SCREENING TEST: ICD-10-CM

## 2023-03-07 DIAGNOSIS — Z00.00 ANNUAL PHYSICAL EXAM: Primary | ICD-10-CM

## 2023-03-07 DIAGNOSIS — F41.9 ANXIETY: ICD-10-CM

## 2023-03-07 DIAGNOSIS — Z23 NEED FOR SHINGLES VACCINE: ICD-10-CM

## 2023-03-07 PROCEDURE — 3074F SYST BP LT 130 MM HG: CPT | Mod: CPTII,S$GLB,, | Performed by: STUDENT IN AN ORGANIZED HEALTH CARE EDUCATION/TRAINING PROGRAM

## 2023-03-07 PROCEDURE — 1159F MED LIST DOCD IN RCRD: CPT | Mod: CPTII,S$GLB,, | Performed by: STUDENT IN AN ORGANIZED HEALTH CARE EDUCATION/TRAINING PROGRAM

## 2023-03-07 PROCEDURE — 99999 PR PBB SHADOW E&M-EST. PATIENT-LVL IV: CPT | Mod: PBBFAC,,, | Performed by: STUDENT IN AN ORGANIZED HEALTH CARE EDUCATION/TRAINING PROGRAM

## 2023-03-07 PROCEDURE — 90471 PNEUMOCOCCAL CONJUGATE VACCINE 20-VALENT: ICD-10-PCS | Mod: S$GLB,,, | Performed by: STUDENT IN AN ORGANIZED HEALTH CARE EDUCATION/TRAINING PROGRAM

## 2023-03-07 PROCEDURE — 3074F PR MOST RECENT SYSTOLIC BLOOD PRESSURE < 130 MM HG: ICD-10-PCS | Mod: CPTII,S$GLB,, | Performed by: STUDENT IN AN ORGANIZED HEALTH CARE EDUCATION/TRAINING PROGRAM

## 2023-03-07 PROCEDURE — 1159F PR MEDICATION LIST DOCUMENTED IN MEDICAL RECORD: ICD-10-PCS | Mod: CPTII,S$GLB,, | Performed by: STUDENT IN AN ORGANIZED HEALTH CARE EDUCATION/TRAINING PROGRAM

## 2023-03-07 PROCEDURE — 3008F PR BODY MASS INDEX (BMI) DOCUMENTED: ICD-10-PCS | Mod: CPTII,S$GLB,, | Performed by: STUDENT IN AN ORGANIZED HEALTH CARE EDUCATION/TRAINING PROGRAM

## 2023-03-07 PROCEDURE — 90677 PNEUMOCOCCAL CONJUGATE VACCINE 20-VALENT: ICD-10-PCS | Mod: S$GLB,,, | Performed by: STUDENT IN AN ORGANIZED HEALTH CARE EDUCATION/TRAINING PROGRAM

## 2023-03-07 PROCEDURE — 3079F DIAST BP 80-89 MM HG: CPT | Mod: CPTII,S$GLB,, | Performed by: STUDENT IN AN ORGANIZED HEALTH CARE EDUCATION/TRAINING PROGRAM

## 2023-03-07 PROCEDURE — 90471 IMMUNIZATION ADMIN: CPT | Mod: S$GLB,,, | Performed by: STUDENT IN AN ORGANIZED HEALTH CARE EDUCATION/TRAINING PROGRAM

## 2023-03-07 PROCEDURE — 99205 OFFICE O/P NEW HI 60 MIN: CPT | Mod: 25,S$GLB,, | Performed by: STUDENT IN AN ORGANIZED HEALTH CARE EDUCATION/TRAINING PROGRAM

## 2023-03-07 PROCEDURE — 90677 PCV20 VACCINE IM: CPT | Mod: S$GLB,,, | Performed by: STUDENT IN AN ORGANIZED HEALTH CARE EDUCATION/TRAINING PROGRAM

## 2023-03-07 PROCEDURE — 1160F RVW MEDS BY RX/DR IN RCRD: CPT | Mod: CPTII,S$GLB,, | Performed by: STUDENT IN AN ORGANIZED HEALTH CARE EDUCATION/TRAINING PROGRAM

## 2023-03-07 PROCEDURE — 3008F BODY MASS INDEX DOCD: CPT | Mod: CPTII,S$GLB,, | Performed by: STUDENT IN AN ORGANIZED HEALTH CARE EDUCATION/TRAINING PROGRAM

## 2023-03-07 PROCEDURE — 99999 PR PBB SHADOW E&M-EST. PATIENT-LVL IV: ICD-10-PCS | Mod: PBBFAC,,, | Performed by: STUDENT IN AN ORGANIZED HEALTH CARE EDUCATION/TRAINING PROGRAM

## 2023-03-07 PROCEDURE — 1160F PR REVIEW ALL MEDS BY PRESCRIBER/CLIN PHARMACIST DOCUMENTED: ICD-10-PCS | Mod: CPTII,S$GLB,, | Performed by: STUDENT IN AN ORGANIZED HEALTH CARE EDUCATION/TRAINING PROGRAM

## 2023-03-07 PROCEDURE — 3079F PR MOST RECENT DIASTOLIC BLOOD PRESSURE 80-89 MM HG: ICD-10-PCS | Mod: CPTII,S$GLB,, | Performed by: STUDENT IN AN ORGANIZED HEALTH CARE EDUCATION/TRAINING PROGRAM

## 2023-03-07 PROCEDURE — 99205 PR OFFICE/OUTPT VISIT, NEW, LEVL V, 60-74 MIN: ICD-10-PCS | Mod: 25,S$GLB,, | Performed by: STUDENT IN AN ORGANIZED HEALTH CARE EDUCATION/TRAINING PROGRAM

## 2023-03-07 RX ORDER — ZOSTER VACCINE RECOMBINANT, ADJUVANTED 50 MCG/0.5
KIT INTRAMUSCULAR
Qty: 1 EACH | Refills: 1 | Status: SHIPPED | OUTPATIENT
Start: 2023-03-07 | End: 2023-08-04

## 2023-03-07 RX ORDER — VENLAFAXINE 37.5 MG/1
37.5 TABLET ORAL DAILY
Qty: 90 TABLET | Refills: 1 | Status: SHIPPED | OUTPATIENT
Start: 2023-03-07 | End: 2023-07-23 | Stop reason: SDUPTHER

## 2023-03-07 NOTE — PROGRESS NOTES
[facility] Note    Chief Complaint      Chief Complaint   Patient presents with    Establish Care     History of Present Illness      HPI    Teresa Valdes is a 52 y.o. female with Anxiety /MDD and cigarette smoking who presents for refill of medication, establishment of care in addition get her annual wellness done. She endorses worsening left great toe deformity, no associated pain. Otherwise she described her mood to be great, actively working on quitting smoking, now down to 1/2pack per day from 1pack per day. She denies any CP, SOB, leg swelling, orthopnea, PND, palpitations, syncope or presyncope.    Problem List Addressed This Visit:  1. Annual physical exam  Overview:  - preventative health counseling provided  - counseling on current recommendations for breast cancer screening. Scheduled for 4/13/2023  - counseling on current recommendations for cervical cancer screening. Pt up-to-date follows with Gynecology  - counseling on current recommendations for colon cancer screening. Pt average risk and colon cancer screening to a 50 years old  - Vaccines recommended at this visit:  See below    Orders:  -     Lipid Panel; Future; Expected date: 03/07/2023  -     TSH; Future; Expected date: 03/07/2023  -     Hemoglobin A1C; Future; Expected date: 03/07/2023  -     Comprehensive Metabolic Panel; Future; Expected date: 03/07/2023  -     CBC Auto Differential; Future; Expected date: 03/07/2023    Orders:  -     Vitamin D; Future; Expected date: 03/07/2023    2. Need for shingles vaccine  -     (In Office Administered) Pneumococcal Conjugate Vaccine (20 Valent) (IM)  -     varicella-zoster gE-AS01B, PF, (SHINGRIX, PF,) 50 mcg/0.5 mL injection; Inject 0.5mL IM at 0 and 2-6 months  Dispense: 1 each; Refill: 1    3. Encounter for colorectal cancer screening  Overview:  - preventative health counseling  - counseling on current recommendations for colon cancer screening. Pt average risk and colon  cancer screening to a 50 years old  - Vaccines recommended at this visit:  See below    Orders:  -     Case Request Endoscopy: COLONOSCOPY    5. Need for hepatitis C screening test  -     Hepatitis C Antibody; Future; Expected date: 03/07/2023    6. Bunion of great toe of left foot  -     Ambulatory referral/consult to Podiatry; Future; Expected date: 03/14/2023    7. Anxiety and depression  -well controlled on current regimen  -refill sent to pharmacy        Health Maintenance   Topic Date Due    Hepatitis C Screening  Never done    Mammogram  06/25/2021    Lipid Panel  09/07/2027    TETANUS VACCINE  02/05/2028       Past Medical History:   Diagnosis Date    Anxiety     Depression     Hypertension     resolved    Ovarian cancer     Sebaceous cyst 4/2/2018       Past Surgical History:   Procedure Laterality Date    ADENOIDECTOMY      BREAST CYST EXCISION Right     BREAST SURGERY      Abscess     CHOLECYSTECTOMY      CYST REMOVAL      on back    cyst removed left wrist      KNEE LIGAMENT RECONSTRUCTION Left     sleeve gastroectomy  06/2017    in [place]    TONSILLECTOMY      Uvuloplasty         family history includes Arthritis in her mother; Breast cancer in her paternal grandmother; Heart disease in her father; Hypertension in her father.    Social History     Tobacco Use    Smoking status: Every Day     Packs/day: 0.50     Types: Cigarettes     Start date: 3/7/2017    Smokeless tobacco: Never   Substance Use Topics    Alcohol use: Yes     Alcohol/week: 2.0 standard drinks     Types: 2 Glasses of wine per week     Comment: occ    Drug use: No       Review of Systems   Constitutional:  Negative for fatigue and fever.   Respiratory:  Negative for cough and chest tightness.    Gastrointestinal:  Negative for abdominal pain, diarrhea and vomiting.   Endocrine: Negative for polydipsia and polyphagia.   Genitourinary:  Negative for difficulty urinating, dysuria and frequency.   Musculoskeletal:  Positive for arthralgias  "(low back pain). Negative for back pain, gait problem and joint swelling.   Skin:  Negative for rash.   Neurological:  Negative for seizures, weakness, numbness and headaches.   Psychiatric/Behavioral:  Negative for sleep disturbance.      Outpatient Encounter Medications as of 3/7/2023   Medication Sig Dispense Refill    multivitamin capsule Take 1 capsule by mouth once daily.      venlafaxine (EFFEXOR) 37.5 MG Tab TAKE 1 TABLET BY MOUTH ONCE DAILY 90 tablet 0    varicella-zoster gE-AS01B, PF, (SHINGRIX, PF,) 50 mcg/0.5 mL injection Inject 0.5mL IM at 0 and 2-6 months 1 each 1     No facility-administered encounter medications on file as of 3/7/2023.        Review of patient's allergies indicates:  No Known Allergies    Physical Exam      Vital Signs  Pulse: 66  SpO2: 100 %  BP: 118/82  BP Location: Left arm  Patient Position: Sitting  Pain Score:   3  Height and Weight  Height: 5' 6" (167.6 cm)  Weight: 77.6 kg (171 lb 1.6 oz)  BSA (Calculated - sq m): 1.9 sq meters  BMI (Calculated): 27.6  Weight in (lb) to have BMI = 25: 154.6]    Physical Exam  Vitals reviewed.   Constitutional:       Appearance: Normal appearance.   HENT:      Head: Normocephalic and atraumatic.      Right Ear: Tympanic membrane normal.      Left Ear: Tympanic membrane normal.      Mouth/Throat:      Mouth: Mucous membranes are moist.      Pharynx: Oropharynx is clear.   Eyes:      Extraocular Movements: Extraocular movements intact.      Conjunctiva/sclera: Conjunctivae normal.      Pupils: Pupils are equal, round, and reactive to light.   Cardiovascular:      Rate and Rhythm: Normal rate and regular rhythm.      Pulses: Normal pulses.      Heart sounds: Normal heart sounds.   Pulmonary:      Effort: Pulmonary effort is normal.      Breath sounds: Normal breath sounds.   Abdominal:      General: Abdomen is flat. Bowel sounds are normal.      Palpations: Abdomen is soft.   Musculoskeletal:         General: Deformity (left first great toe " valgus deformity) present.      Cervical back: Normal range of motion.      Right lower leg: No edema.      Left lower leg: No edema.   Skin:     General: Skin is warm and dry.      Findings: Rash: diffuse depigmented patches on the left lateral neck, forarm and legs.   Neurological:      General: No focal deficit present.      Mental Status: She is alert and oriented to person, place, and time.      Sensory: No sensory deficit.   Psychiatric:         Mood and Affect: Mood normal.         Behavior: Behavior normal.        Laboratory:  CBC:  No results for input(s): WBC, RBC, HGB, HCT, PLT, MCV, MCH, MCHC in the last 2160 hours.  CMP:  No results for input(s): GLU, CALCIUM, ALBUMIN, PROT, NA, K, CO2, CL, BUN, ALKPHOS, ALT, AST, BILITOT in the last 2160 hours.    Invalid input(s): CREATININ  URINALYSIS:  No results for input(s): COLORU, CLARITYU, SPECGRAV, PHUR, PROTEINUA, GLUCOSEU, BILIRUBINCON, BLOODU, WBCU, RBCU, BACTERIA, MUCUS, NITRITE, LEUKOCYTESUR, UROBILINOGEN, HYALINECASTS in the last 2160 hours.   LIPIDS:  No results for input(s): TSH, HDL, CHOL, TRIG, LDLCALC, CHOLHDL, NONHDLCHOL, TOTALCHOLEST in the last 2160 hours.  TSH:  No results for input(s): TSH in the last 2160 hours.  A1C:  No results for input(s): HGBA1C in the last 2160 hours.    Radiology:      Assessment/Plan     Teresa Valdes is a 52 y.o.female with:    1. Annual physical exam  Overview:  - preventative health counseling provided  - counseling on current recommendations for breast cancer screening. Scheduled for 4/13/2023  - counseling on current recommendations for cervical cancer screening. Pt up-to-date follows with Gynecology  - counseling on current recommendations for colon cancer screening. Pt average risk and colon cancer screening to a 50 years old  - Vaccines recommended at this visit:  See below    Orders:  -     Lipid Panel; Future; Expected date: 03/07/2023  -     TSH; Future; Expected date: 03/07/2023  -     Hemoglobin A1C;  Future; Expected date: 03/07/2023  -     Comprehensive Metabolic Panel; Future; Expected date: 03/07/2023  -     CBC Auto Differential; Future; Expected date: 03/07/2023    Orders:  -     Vitamin D; Future; Expected date: 03/07/2023    2. Need for shingles vaccine  -     (In Office Administered) Pneumococcal Conjugate Vaccine (20 Valent) (IM)  -     varicella-zoster gE-AS01B, PF, (SHINGRIX, PF,) 50 mcg/0.5 mL injection; Inject 0.5mL IM at 0 and 2-6 months  Dispense: 1 each; Refill: 1    3. Encounter for colorectal cancer screening  Overview:  - preventative health counseling  - counseling on current recommendations for colon cancer screening. Pt average risk and colon cancer screening to a 50 years old  - Vaccines recommended at this visit:  See below    Orders:  -     Case Request Endoscopy: COLONOSCOPY    5. Need for hepatitis C screening test  -     Hepatitis C Antibody; Future; Expected date: 03/07/2023    6. Bunion of great toe of left foot  -     Ambulatory referral/consult to Podiatry; Future; Expected date: 03/14/2023    7. Anxiety and depression  -well controlled on current regimen  -refill sent to pharmacy          -Continue current medications and maintain follow up with specialists.      Patient verbalizes understanding and agrees with current treatment plan.      Dr Maryse Abreu MD  [facility]

## 2023-03-10 ENCOUNTER — PATIENT MESSAGE (OUTPATIENT)
Dept: GASTROENTEROLOGY | Facility: CLINIC | Age: 53
End: 2023-03-10
Payer: COMMERCIAL

## 2023-03-10 ENCOUNTER — TELEPHONE (OUTPATIENT)
Dept: GASTROENTEROLOGY | Facility: CLINIC | Age: 53
End: 2023-03-10
Payer: COMMERCIAL

## 2023-03-10 NOTE — TELEPHONE ENCOUNTER
Referring Physician: Maryse Abreu MD                              Date: 3/10/2023    Reason for Referral: Screening colonoscopy      Family History of:   Colon polyp: No  Relationship/Age of Onset:       Colon cancer: No  Relationship/Age of Onset:       Patient with:   Hemoccults Done:       Iron deficient:   no      On Blood Thinner: no      Valvular heart disease/valve replacement: No      Anemia Present: No      On NSAID: No    On Adipex or phentermine: no     On Ozempic:no     Lung disease: No      Kidney disease: No     Hx of Crohn's or Ulcerative colitis: No     Hx of polyps:       Hx of colon cancer:       Previous colon evalations: First colonoscopy  When:   Where:   Pertinent symptoms:           Review of patient's allergies indicates: NKDA        Patient was scheduled for colonoscopy on 4/11/2023       with Dr. Valverde at [facility].       instructions were reviewed with patient.          Prep sent to [facility] Instructions    You are scheduled for a colonoscopy with Dr. Valverde on 4/11/2023 at [facility]. Enter through the [facility] Entrance and check in at Same Day Surgery.  To ensure that your test is accurate and complete, you MUST follow these instructions listed below.  If you have any questions, please call our office at [phone].  Plan on being at the hospital for your procedure for 3-4 hours.       IF YOU HAVE ANY QUESTIONS ABOUT YOUR ARRIVAL TIME YOU CAN CALL [phone].    1.  Follow a CLEAR LIQUID DIET for the entire day before your scheduled colonoscopy.  This means no solid food the entire day starting when you wake.  You may have as much of the clear liquids as you want throughout the day.   CLEAR LIQUID DIET:      - NO DAIRY   - You can have:  Coffee with sugar (no creamer), tea, water, soda, apple or white grape juice, chicken or beef broth/bouillon (no meat, noodles, or veggies), popsicles, Jell-O, lemonade.    2.  AT 5 pm the evening before your  colonoscopy, OPEN ONE (1) BOTTLE OF CLENPIQ AND DRINK THE ENTIRE BOTTLE.  DRINK FIVE (5) 8-OUNCE GLASSES OF WATER (40 OUNCES TOTAL) OVER THE NEXT FIVE (5) HOURS.     3.  The endoscopy department will call you 1 day before your colonoscopy to tell you the exact time to arrive, AND to tell you the exact time to drink the 2nd portion of your prep (which will be FIVE HOURS BEFORE YOUR ARRIVAL TIME).  At this time given to you, OPEN THE OTHER ONE (1) BOTTLE OF CLENPIQ AND DRINK THE ENTIRE BOTTLE.  DRINK THREE (3) 8-OUNCE GLASSES OF WATER (24 OUNCES TOTAL) OVER THE NEXT THREE (3) HOURS. Once this is complete, you can not have anything else by mouth!    4.  You must have someone with you to DRIVE YOU HOME since you will be receiving IV sedation for the colonoscopy.    5.  It is ok to take MOST of your REGULAR MEDICATIONS  in the morning of your test with a SIP of water.  THE ONLY MEDS YOU NEED TO HOLD ARE YOUR DIABETES MEDICATIONS,  SOME BLOOD PRESSURE MEDS, AND BLOOD THINNERS IF OK'D BY YOUR DOCTOR.  Do NOT have anything else to eat or drink the morning of your colonoscopy.  It is ok to brush your teeth.    6.  If you are on blood thinners THAT YOU HAVE BEEN INSTRUCTED TO HOLD BY YOUR DOCTOR FOR THIS PROCEDURE, then do NOT take this the morning of your colonoscopy.  Do NOT stop these medications on your own, they must be approved to be held by your doctor.  Your colonoscopy can NOT be done if you are on these medications.  Examples of blood thinners include: Coumadin, Aggrenox, Plavix, Pradaxa, Reapro, Pletal, Xarelto, Ticagrelor, Brilinta, Eliquis, and high dose aspirin (325 mg).  You do not have to stop baby aspirin 81 mg.    7.  IF YOU ARE DIABETIC:  NO INSULIN OR ORAL MEDICATIONS THE MORNING OF THE COLONOSCOPY.  TAKE ONLY HALF THE DOSE OF YOUR INSULIN THE DAY BEFORE THE COLONOSCOPY.  DO NOT TAKE ANY ORAL DIABETIC MEDICATIONS THE DAY BEFORE THE COLONOSCOPY.  IF YOU ARE AN INSULIN DEPENDENT DIABETIC WITH UNSTABLE BLOOD  SUGARS, NOTIFY YOUR PRIMARY CARE PHYSICIAN FOR INSTRUCTIONS.    8.  Please DO use your inhalers the morning of your procedure.

## 2023-03-13 RX ORDER — SODIUM PICOSULFATE, MAGNESIUM OXIDE, AND ANHYDROUS CITRIC ACID 10; 3.5; 12 MG/160ML; G/160ML; G/160ML
LIQUID ORAL
Qty: 1 EACH | Refills: 0 | Status: ON HOLD | OUTPATIENT
Start: 2023-03-13 | End: 2023-04-11 | Stop reason: HOSPADM

## 2023-03-17 ENCOUNTER — PATIENT OUTREACH (OUTPATIENT)
Dept: ADMINISTRATIVE | Facility: HOSPITAL | Age: 53
End: 2023-03-17
Payer: COMMERCIAL

## 2023-03-17 NOTE — PROGRESS NOTES
Non-compliant GAP report chart review - Chart review completed for the following HM test if overdue  (Mammogram, Colonoscopy, Cervical Cancer Screening,  Diabetic lab testing, and/or Dilated EYE EXAM)  03/17/2023    Care Everywhere and Media reports - updates requested and reviewed.       Mammogram scheduled on 04/13/23        Health Maintenance Due   Topic Date Due    Hepatitis C Screening  Never done    Colorectal Cancer Screening  Never done    Hemoglobin A1c (Diabetic Prevention Screening)  10/01/2018    COVID-19 Vaccine (3 - Booster for Pfizer series) 05/14/2021    Mammogram  06/25/2021    Shingles Vaccine (1 of 2) Never done    Cervical Cancer Screening  02/27/2023

## 2023-04-14 ENCOUNTER — PATIENT MESSAGE (OUTPATIENT)
Dept: FAMILY MEDICINE | Facility: CLINIC | Age: 53
End: 2023-04-14
Payer: COMMERCIAL

## 2023-04-14 DIAGNOSIS — R79.89 LOW SERUM VITAMIN D: Primary | ICD-10-CM

## 2023-04-14 RX ORDER — ERGOCALCIFEROL 1.25 MG/1
50000 CAPSULE ORAL
Qty: 12 CAPSULE | Refills: 0 | Status: SHIPPED | OUTPATIENT
Start: 2023-04-14 | End: 2023-08-04

## 2023-06-26 ENCOUNTER — PATIENT MESSAGE (OUTPATIENT)
Dept: FAMILY MEDICINE | Facility: CLINIC | Age: 53
End: 2023-06-26
Payer: COMMERCIAL

## 2023-06-28 ENCOUNTER — OFFICE VISIT (OUTPATIENT)
Dept: FAMILY MEDICINE | Facility: CLINIC | Age: 53
End: 2023-06-28
Payer: COMMERCIAL

## 2023-06-28 VITALS
DIASTOLIC BLOOD PRESSURE: 82 MMHG | WEIGHT: 168.81 LBS | HEIGHT: 66 IN | SYSTOLIC BLOOD PRESSURE: 110 MMHG | OXYGEN SATURATION: 98 % | BODY MASS INDEX: 27.13 KG/M2 | HEART RATE: 75 BPM

## 2023-06-28 DIAGNOSIS — F41.9 ANXIETY AND DEPRESSION: ICD-10-CM

## 2023-06-28 DIAGNOSIS — F32.A ANXIETY AND DEPRESSION: ICD-10-CM

## 2023-06-28 DIAGNOSIS — N63.0 BREAST MASS IN FEMALE: Primary | ICD-10-CM

## 2023-06-28 DIAGNOSIS — S01.81XD LACERATION OF SKIN OF FOREHEAD, SUBSEQUENT ENCOUNTER: Primary | ICD-10-CM

## 2023-06-28 PROBLEM — S01.81XA LACERATION OF SKIN OF FOREHEAD: Status: ACTIVE | Noted: 2023-06-28

## 2023-06-28 PROCEDURE — 3044F HG A1C LEVEL LT 7.0%: CPT | Mod: CPTII,S$GLB,, | Performed by: STUDENT IN AN ORGANIZED HEALTH CARE EDUCATION/TRAINING PROGRAM

## 2023-06-28 PROCEDURE — 3074F SYST BP LT 130 MM HG: CPT | Mod: CPTII,S$GLB,, | Performed by: STUDENT IN AN ORGANIZED HEALTH CARE EDUCATION/TRAINING PROGRAM

## 2023-06-28 PROCEDURE — 1160F RVW MEDS BY RX/DR IN RCRD: CPT | Mod: CPTII,S$GLB,, | Performed by: STUDENT IN AN ORGANIZED HEALTH CARE EDUCATION/TRAINING PROGRAM

## 2023-06-28 PROCEDURE — 1159F MED LIST DOCD IN RCRD: CPT | Mod: CPTII,S$GLB,, | Performed by: STUDENT IN AN ORGANIZED HEALTH CARE EDUCATION/TRAINING PROGRAM

## 2023-06-28 PROCEDURE — 3008F BODY MASS INDEX DOCD: CPT | Mod: CPTII,S$GLB,, | Performed by: STUDENT IN AN ORGANIZED HEALTH CARE EDUCATION/TRAINING PROGRAM

## 2023-06-28 PROCEDURE — 3044F PR MOST RECENT HEMOGLOBIN A1C LEVEL <7.0%: ICD-10-PCS | Mod: CPTII,S$GLB,, | Performed by: STUDENT IN AN ORGANIZED HEALTH CARE EDUCATION/TRAINING PROGRAM

## 2023-06-28 PROCEDURE — 3079F PR MOST RECENT DIASTOLIC BLOOD PRESSURE 80-89 MM HG: ICD-10-PCS | Mod: CPTII,S$GLB,, | Performed by: STUDENT IN AN ORGANIZED HEALTH CARE EDUCATION/TRAINING PROGRAM

## 2023-06-28 PROCEDURE — 3079F DIAST BP 80-89 MM HG: CPT | Mod: CPTII,S$GLB,, | Performed by: STUDENT IN AN ORGANIZED HEALTH CARE EDUCATION/TRAINING PROGRAM

## 2023-06-28 PROCEDURE — 99213 PR OFFICE/OUTPT VISIT, EST, LEVL III, 20-29 MIN: ICD-10-PCS | Mod: S$GLB,,, | Performed by: STUDENT IN AN ORGANIZED HEALTH CARE EDUCATION/TRAINING PROGRAM

## 2023-06-28 PROCEDURE — 99999 PR PBB SHADOW E&M-EST. PATIENT-LVL IV: CPT | Mod: PBBFAC,,, | Performed by: STUDENT IN AN ORGANIZED HEALTH CARE EDUCATION/TRAINING PROGRAM

## 2023-06-28 PROCEDURE — 1159F PR MEDICATION LIST DOCUMENTED IN MEDICAL RECORD: ICD-10-PCS | Mod: CPTII,S$GLB,, | Performed by: STUDENT IN AN ORGANIZED HEALTH CARE EDUCATION/TRAINING PROGRAM

## 2023-06-28 PROCEDURE — 99999 PR PBB SHADOW E&M-EST. PATIENT-LVL IV: ICD-10-PCS | Mod: PBBFAC,,, | Performed by: STUDENT IN AN ORGANIZED HEALTH CARE EDUCATION/TRAINING PROGRAM

## 2023-06-28 PROCEDURE — 1160F PR REVIEW ALL MEDS BY PRESCRIBER/CLIN PHARMACIST DOCUMENTED: ICD-10-PCS | Mod: CPTII,S$GLB,, | Performed by: STUDENT IN AN ORGANIZED HEALTH CARE EDUCATION/TRAINING PROGRAM

## 2023-06-28 PROCEDURE — 3074F PR MOST RECENT SYSTOLIC BLOOD PRESSURE < 130 MM HG: ICD-10-PCS | Mod: CPTII,S$GLB,, | Performed by: STUDENT IN AN ORGANIZED HEALTH CARE EDUCATION/TRAINING PROGRAM

## 2023-06-28 PROCEDURE — 3008F PR BODY MASS INDEX (BMI) DOCUMENTED: ICD-10-PCS | Mod: CPTII,S$GLB,, | Performed by: STUDENT IN AN ORGANIZED HEALTH CARE EDUCATION/TRAINING PROGRAM

## 2023-06-28 PROCEDURE — 99213 OFFICE O/P EST LOW 20 MIN: CPT | Mod: S$GLB,,, | Performed by: STUDENT IN AN ORGANIZED HEALTH CARE EDUCATION/TRAINING PROGRAM

## 2023-06-28 NOTE — PROGRESS NOTES
[facility] Note    Chief Complaint      Chief Complaint   Patient presents with    Suture / Staple Removal     Forehead      History of Present Illness      Suture / Staple Removal      Teresa Valdes is a 52 y.o. female with Anxiety /MDD and cigarette smoking who presents for stitch removal from her right lateral forehead s/p fall 1.5 weeks ago over the garbage container she was taking out from her house, healed as expected, only with residual left sided neck pain, no HA, memory loss, focal weakness. .    Problem List Addressed This Visit:  Forehead laceration    2. Anxiety and depression  -well controlled on current regimen  -refill sent to pharmacy        Health Maintenance   Topic Date Due    Mammogram  06/27/2024    TETANUS VACCINE  02/05/2028    Lipid Panel  04/13/2028    Hepatitis C Screening  Completed       Past Medical History:   Diagnosis Date    Anxiety     Depression     Hypertension     resolved    Ovarian cancer     Sebaceous cyst 4/2/2018       Past Surgical History:   Procedure Laterality Date    ADENOIDECTOMY      BREAST CYST EXCISION Right     BREAST SURGERY      Abscess     CHOLECYSTECTOMY      COLONOSCOPY N/A 4/11/2023    Procedure: COLONOSCOPY;  Surgeon: Mona Valverde MD;  Location: [facility];  Service: Endoscopy;  Laterality: N/A;    CYST REMOVAL      on back    cyst removed left wrist      KNEE LIGAMENT RECONSTRUCTION Left     sleeve gastroectomy  06/2017    in [place]    TONSILLECTOMY      Uvuloplasty         family history includes Arthritis in her mother; Breast cancer in her paternal grandmother; Heart disease in her father; Hypertension in her father.    Social History     Tobacco Use    Smoking status: Every Day     Packs/day: 0.50     Types: Cigarettes     Start date: 3/7/2017    Smokeless tobacco: Never   Substance Use Topics    Alcohol use: Yes     Alcohol/week: 2.0 standard drinks     Types: 2 Glasses of wine per week     Comment: occ    Drug use: Never  "      Review of Systems   Constitutional:  Negative for fatigue and fever.   Respiratory:  Negative for cough and chest tightness.    Gastrointestinal:  Negative for abdominal pain, diarrhea and vomiting.   Endocrine: Negative for polydipsia and polyphagia.   Genitourinary:  Negative for difficulty urinating, dysuria and frequency.   Musculoskeletal:  Positive for neck pain. Negative for arthralgias, back pain, gait problem and joint swelling.   Skin:  Positive for wound (fore head). Negative for rash.   Neurological:  Negative for seizures, weakness, numbness and headaches.   Psychiatric/Behavioral:  Negative for sleep disturbance.      Outpatient Encounter Medications as of 6/28/2023   Medication Sig Dispense Refill    multivitamin capsule Take 1 capsule by mouth once daily.      venlafaxine (EFFEXOR) 37.5 MG Tab Take 1 tablet (37.5 mg total) by mouth once daily. 90 tablet 1    ergocalciferol (ERGOCALCIFEROL) 50,000 unit Cap Take 1 capsule (50,000 Units total) by mouth every 7 days. 12 capsule 0    varicella-zoster gE-AS01B, PF, (SHINGRIX, PF,) 50 mcg/0.5 mL injection Inject 0.5mL IM at 0 and 2-6 months 1 each 1     No facility-administered encounter medications on file as of 6/28/2023.        Review of patient's allergies indicates:  No Known Allergies    Physical Exam      Vital Signs  Pulse: 75  SpO2: 98 %  BP: 110/82  Pain Score: 0-No pain  Height and Weight  Height: 5' 6" (167.6 cm)  Weight: 76.6 kg (168 lb 12.8 oz)  BSA (Calculated - sq m): 1.89 sq meters  BMI (Calculated): 27.3  Weight in (lb) to have BMI = 25: 154.6]    Physical Exam  Vitals reviewed.   Constitutional:       Appearance: Normal appearance.   HENT:      Head: Normocephalic and atraumatic.      Right Ear: Tympanic membrane normal.      Left Ear: Tympanic membrane normal.      Mouth/Throat:      Mouth: Mucous membranes are moist.      Pharynx: Oropharynx is clear.   Eyes:      Extraocular Movements: Extraocular movements intact.      " Conjunctiva/sclera: Conjunctivae normal.      Pupils: Pupils are equal, round, and reactive to light.   Cardiovascular:      Rate and Rhythm: Normal rate and regular rhythm.      Pulses: Normal pulses.      Heart sounds: Normal heart sounds.   Pulmonary:      Effort: Pulmonary effort is normal.      Breath sounds: Normal breath sounds.   Abdominal:      General: Abdomen is flat. Bowel sounds are normal.      Palpations: Abdomen is soft.   Musculoskeletal:         General: No deformity.      Cervical back: Normal range of motion.      Right lower leg: No edema.      Left lower leg: No edema.   Skin:     General: Skin is warm and dry.      Findings: Rash: Right lateral fore head with well healed laceration.   Neurological:      General: No focal deficit present.      Mental Status: She is alert and oriented to person, place, and time.      Sensory: No sensory deficit.   Psychiatric:         Mood and Affect: Mood normal.         Behavior: Behavior normal.        Laboratory:  CBC:  Recent Labs   Lab Result Units 04/13/23  1203   WBC K/uL 5.36   RBC M/uL 3.97*   Hemoglobin g/dL 14.0   Hematocrit % 40.4   Platelets K/uL 166   MCV fL 102*   MCH pg 35.3*   MCHC g/dL 34.7       CMP:  Recent Labs   Lab Result Units 04/13/23  1203   Glucose mg/dL 78   Calcium mg/dL 9.3   Albumin g/dL 4.2   Total Protein g/dL 7.5   Sodium mmol/L 144   Potassium mmol/L 4.5   CO2 mmol/L 31*   Chloride mmol/L 110   BUN mg/dL 14   Alkaline Phosphatase U/L 62   ALT U/L 16   AST U/L 26   Total Bilirubin mg/dL 0.5       URINALYSIS:  No results for input(s): COLORU, CLARITYU, SPECGRAV, PHUR, PROTEINUA, GLUCOSEU, BILIRUBINCON, BLOODU, WBCU, RBCU, BACTERIA, MUCUS, NITRITE, LEUKOCYTESUR, UROBILINOGEN, HYALINECASTS in the last 2160 hours.   LIPIDS:  Recent Labs   Lab Result Units 04/13/23  1203   TSH uIU/mL 1.280   HDL mg/dL 71   Cholesterol mg/dL 153   Triglycerides mg/dL 96   LDL Cholesterol mg/dL 62.8*   HDL/Cholesterol Ratio % 46.4   Non-HDL  Cholesterol mg/dL 82   Total Cholesterol/HDL Ratio  2.2       TSH:  Recent Labs   Lab Result Units 04/13/23  1203   TSH uIU/mL 1.280       A1C:  Recent Labs   Lab Result Units 04/13/23  1203   Hemoglobin A1C % 4.8         Radiology:      Assessment/Plan     Teresa Valdes is a 52 y.o.female with:    Fore head laceration  -stitches removed (about 10)  -healing as expected    2. Anxiety and depression  -well controlled on current regimen  -refill sent to pharmacy          -Continue current medications and maintain follow up with specialists.      Patient verbalizes understanding and agrees with current treatment plan.      Dr Maryse Abreu MD  [facility] [place]

## 2023-06-29 ENCOUNTER — TELEPHONE (OUTPATIENT)
Dept: RADIOLOGY | Facility: HOSPITAL | Age: 53
End: 2023-06-29
Payer: COMMERCIAL

## 2023-06-29 ENCOUNTER — TELEPHONE (OUTPATIENT)
Dept: HEMATOLOGY/ONCOLOGY | Facility: CLINIC | Age: 53
End: 2023-06-29
Payer: COMMERCIAL

## 2023-06-29 ENCOUNTER — TELEPHONE (OUTPATIENT)
Dept: FAMILY MEDICINE | Facility: CLINIC | Age: 53
End: 2023-06-29
Payer: COMMERCIAL

## 2023-06-29 NOTE — TELEPHONE ENCOUNTER
Spoke with patient. Reviewed breast biopsy procedure and reviewed instructions for breast biopsy. Patient expressed understanding and all questions were answered. Provided patient with my phone number to call for any further concerns or questions.   Patient scheduled breast biopsy at the [facility] on 7/24/2023.

## 2023-06-29 NOTE — TELEPHONE ENCOUNTER
----- Message from Maryse Abreu MD sent at 6/28/2023  8:11 PM CDT -----    Please let patient know her breast ultrasound showed suspicious mass and I have put in referral to Hematology/oncology so further evaluation can be done.

## 2023-06-29 NOTE — TELEPHONE ENCOUNTER
----- Message from Cherelle Merlos sent at 6/29/2023 11:32 AM CDT -----  Patient has a referral from Dr. Mccann for Breast mass in female. Please call patient to schedule.

## 2023-07-23 DIAGNOSIS — F41.9 ANXIETY AND DEPRESSION: ICD-10-CM

## 2023-07-23 DIAGNOSIS — F32.A ANXIETY AND DEPRESSION: ICD-10-CM

## 2023-07-23 DIAGNOSIS — F41.9 ANXIETY: ICD-10-CM

## 2023-07-24 ENCOUNTER — HOSPITAL ENCOUNTER (OUTPATIENT)
Dept: RADIOLOGY | Facility: HOSPITAL | Age: 53
Discharge: HOME | End: 2023-07-24
Attending: STUDENT IN AN ORGANIZED HEALTH CARE EDUCATION/TRAINING PROGRAM
Payer: COMMERCIAL

## 2023-07-24 DIAGNOSIS — N63.20 BREAST MASS, LEFT: Primary | ICD-10-CM

## 2023-07-24 DIAGNOSIS — R92.8 OTHER ABNORMAL AND INCONCLUSIVE FINDINGS ON DIAGNOSTIC IMAGING OF BREAST: ICD-10-CM

## 2023-07-24 PROCEDURE — 77065 DX MAMMO INCL CAD UNI: CPT | Mod: TC,LT

## 2023-07-24 PROCEDURE — A4648 IMPLANTABLE TISSUE MARKER: HCPCS

## 2023-07-24 PROCEDURE — 88360 PR  TUMOR IMMUNOHISTOCHEM/MANUAL: ICD-10-PCS | Mod: 26,,, | Performed by: PATHOLOGY

## 2023-07-24 PROCEDURE — 19083 BX BREAST 1ST LESION US IMAG: CPT | Mod: LT,,, | Performed by: RADIOLOGY

## 2023-07-24 PROCEDURE — 27200939 US BREAST BIOPSY WITH IMAGING 1ST SITE LEFT

## 2023-07-24 PROCEDURE — 88305 TISSUE EXAM BY PATHOLOGIST: CPT | Mod: 26,,, | Performed by: PATHOLOGY

## 2023-07-24 PROCEDURE — 88305 TISSUE EXAM BY PATHOLOGIST: CPT | Performed by: PATHOLOGY

## 2023-07-24 PROCEDURE — 19083 US BREAST BIOPSY WITH IMAGING 1ST SITE LEFT: ICD-10-PCS | Mod: LT,,, | Performed by: RADIOLOGY

## 2023-07-24 PROCEDURE — 88360 TUMOR IMMUNOHISTOCHEM/MANUAL: CPT | Mod: 59 | Performed by: PATHOLOGY

## 2023-07-24 PROCEDURE — 25000003 PHARM REV CODE 250: Performed by: STUDENT IN AN ORGANIZED HEALTH CARE EDUCATION/TRAINING PROGRAM

## 2023-07-24 PROCEDURE — 88360 TUMOR IMMUNOHISTOCHEM/MANUAL: CPT | Mod: 26,,, | Performed by: PATHOLOGY

## 2023-07-24 PROCEDURE — 77065 MAMMO DIGITAL DIAGNOSTIC LEFT: ICD-10-PCS | Mod: 26,LT,, | Performed by: RADIOLOGY

## 2023-07-24 PROCEDURE — 77065 DX MAMMO INCL CAD UNI: CPT | Mod: 26,LT,, | Performed by: RADIOLOGY

## 2023-07-24 PROCEDURE — 88305 TISSUE EXAM BY PATHOLOGIST: ICD-10-PCS | Mod: 26,,, | Performed by: PATHOLOGY

## 2023-07-24 RX ORDER — LIDOCAINE HYDROCHLORIDE 10 MG/ML
3 INJECTION, SOLUTION EPIDURAL; INFILTRATION; INTRACAUDAL; PERINEURAL ONCE
Status: COMPLETED | OUTPATIENT
Start: 2023-07-24 | End: 2023-07-24

## 2023-07-24 RX ORDER — VENLAFAXINE 37.5 MG/1
37.5 TABLET ORAL DAILY
Qty: 90 TABLET | Refills: 3 | Status: SHIPPED | OUTPATIENT
Start: 2023-07-24

## 2023-07-24 RX ADMIN — LIDOCAINE HYDROCHLORIDE 30 MG: 10 INJECTION, SOLUTION EPIDURAL; INFILTRATION; INTRACAUDAL; PERINEURAL at 10:07

## 2023-07-27 ENCOUNTER — TELEPHONE (OUTPATIENT)
Dept: HEMATOLOGY/ONCOLOGY | Facility: CLINIC | Age: 53
End: 2023-07-27
Payer: COMMERCIAL

## 2023-07-27 ENCOUNTER — PATIENT MESSAGE (OUTPATIENT)
Dept: HEMATOLOGY/ONCOLOGY | Facility: CLINIC | Age: 53
End: 2023-07-27
Payer: COMMERCIAL

## 2023-07-27 DIAGNOSIS — C50.912 INVASIVE DUCTAL CARCINOMA OF BREAST, FEMALE, LEFT: Primary | ICD-10-CM

## 2023-07-27 NOTE — TELEPHONE ENCOUNTER
----- Message from Ray Santos MD sent at 7/25/2023  5:02 PM CDT -----  Malignant and concordant.    Thank you,    Ray Santos M.D.

## 2023-07-27 NOTE — TELEPHONE ENCOUNTER
Called Patient with results of breast biopsy from 7/24/23.  Explained that the biopsy showed invasive ductal carcinoma . Discussed what this means and that the next step is to meet with a breast surgeon and medical oncologist. An appt was made for 8/3/23 with Dr. Sequeira and Dr Johnson.   Reviewed location of breast center. Patient verbalized understanding. ----- Message from Ray Santos MD sent at 7/25/2023  5:02 PM CDT -----  Malignant and concordant.    Thank you,    Ray Santos M.D.

## 2023-07-28 ENCOUNTER — TELEPHONE (OUTPATIENT)
Dept: HEMATOLOGY/ONCOLOGY | Facility: CLINIC | Age: 53
End: 2023-07-28
Payer: COMMERCIAL

## 2023-07-28 ENCOUNTER — DOCUMENTATION ONLY (OUTPATIENT)
Dept: HEMATOLOGY/ONCOLOGY | Facility: CLINIC | Age: 53
End: 2023-07-28
Payer: COMMERCIAL

## 2023-07-28 NOTE — PROGRESS NOTES
Oncology Navigation   Intake  Date of Diagnosis: 07/24/23  Cancer Type: Breast  Internal / External Referral: Internal  Date of Referral: 06/28/23  Initial Nurse Navigator Contact: 07/27/23  Referral to Initial Contact Timeline (days): 29  Date Worked: 07/27/23  First Appointment Available: 08/03/23  Appointment Date: 08/03/23  First Available Date vs. Scheduled Date (days): 0     Treatment  Current Status: Staging work-up    Surgical Oncologist: Hua  Consult Date: 08/03/23    Medical Oncologist: Elizabeth  Consult Date: 08/03/23       Procedures: Biopsy; Diagnostic Mammogram; MRI; Ultrasound  Biopsy Schedule Date: 07/24/23  Diagnostic Mammo Schedule Date: 06/27/23  MRI Schedule Date: 07/31/23  Ultrasound Schedule Date: 06/27/23       ER: Negative  MO: Negative  Her2: -- (Fish)       Support Systems: Family members; Spouse/significant other     Acuity  Treatment Tolerability: Has not started treatment yet/treatment fully completed and side effects resolved  Hospitalization Within the Past Month: 0   Needed: 0  Support: 0  Verbalizes Financial Concerns: 0  Transportation: 0  Psychological Factors (+1 each): Emotional during conversation  History of noncompliance/frequent no shows and cancellations: 0  Verbalizes the need for more education: 0  Navigation Acuity: 2     Follow Up  No follow-ups on file.

## 2023-07-28 NOTE — TELEPHONE ENCOUNTER
Called Patient with results of breast biopsy from 7/24/23.  Explained that the biopsy showed invasive ductal carcinoma . Discussed what this means and that the next step is to meet with a breast surgeon and medical oncologist. An appt was made for 8/4/23 with Dr. Sequeira and Dr Johnson.  Reviewed location of breast center. Patient verbalized understanding.         ----- Message from Ray Santos MD sent at 7/25/2023  5:02 PM CDT -----  Malignant and concordant.    Thank you,    Ray Santos M.D.

## 2023-07-31 ENCOUNTER — HOSPITAL ENCOUNTER (OUTPATIENT)
Dept: RADIOLOGY | Facility: OTHER | Age: 53
Discharge: HOME | End: 2023-07-31
Attending: SURGERY
Payer: COMMERCIAL

## 2023-07-31 DIAGNOSIS — C50.912 INVASIVE DUCTAL CARCINOMA OF BREAST, FEMALE, LEFT: ICD-10-CM

## 2023-07-31 PROCEDURE — 25500020 PHARM REV CODE 255: Performed by: SURGERY

## 2023-07-31 PROCEDURE — 77049 MRI BREAST W/WO CONTRAST, W/CAD, BILATERAL: ICD-10-PCS | Mod: 26,,, | Performed by: RADIOLOGY

## 2023-07-31 PROCEDURE — A9577 INJ MULTIHANCE: HCPCS | Performed by: SURGERY

## 2023-07-31 PROCEDURE — 77049 MRI BREAST C-+ W/CAD BI: CPT | Mod: TC

## 2023-07-31 PROCEDURE — 77049 MRI BREAST C-+ W/CAD BI: CPT | Mod: 26,,, | Performed by: RADIOLOGY

## 2023-07-31 RX ADMIN — GADOBENATE DIMEGLUMINE 15 ML: 529 INJECTION, SOLUTION INTRAVENOUS at 04:07

## 2023-08-02 PROBLEM — C50.919 INVASIVE DUCTAL CARCINOMA OF BREAST: Status: ACTIVE | Noted: 2023-08-02

## 2023-08-02 NOTE — PROGRESS NOTES
[facility]/ [facility]      Chief Complaint: No diagnosis found.    Cancer Staging   No matching staging information was found for the patient.    HPI:  Teresa Valdes is a 52 y.o. female who presents today for evaluation of newly diagnosed breast cancer. Her oncologic history is as follows:    -screening MMG 4/13/23 showing an asymmetry in the central L breast. Diagnostic MMG/US revealing a 19 x 12 x17mm complex cystic and solid mass seen in the L breast at 11oclock. No L axillary LAD   -7/24/23 biopsy confirming IDC, grade 3. ER negative, MN negative, Her2 2+, negative FISH. Ki67 80%  -7/31/23 MRI breast     Today she presents for evaluation.      Gyn History:   Menarche: ***  Menopause: ***  G***P***  Age at first pregnancy: ***  : ***  HRT:    Social History     Tobacco Use    Smoking status: Every Day     Current packs/day: 0.50     Average packs/day: 0.5 packs/day for 6.4 years (3.2 ttl pk-yrs)     Types: Cigarettes     Start date: 3/7/2017    Smokeless tobacco: Never   Substance Use Topics    Alcohol use: Yes     Alcohol/week: 2.0 standard drinks of alcohol     Types: 2 Glasses of wine per week     Comment: occ    Drug use: Never     Family History   Problem Relation Age of Onset    Arthritis Mother     Hypertension Father     Heart disease Father     Breast cancer Paternal Grandmother     Cancer Neg Hx      Past Medical History:   Diagnosis Date    Anxiety     Depression     Hypertension     resolved    Ovarian cancer     Sebaceous cyst 4/2/2018     Past Surgical History:   Procedure Laterality Date    ADENOIDECTOMY      BREAST CYST EXCISION Right     BREAST SURGERY      Abscess     CHOLECYSTECTOMY      COLONOSCOPY N/A 4/11/2023    Procedure: COLONOSCOPY;  Surgeon: Mona Valverde MD;  Location: [facility];  Service: Endoscopy;  Laterality: N/A;    CYST REMOVAL      on back    cyst removed left wrist      KNEE LIGAMENT  RECONSTRUCTION Left     sleeve gastroectomy  06/2017    in [place]    TONSILLECTOMY      Uvuloplasty         Patient Active Problem List   Diagnosis    Encounter for colorectal cancer screening    Anxiety and depression    Overweight (BMI 25.0-29.9)    Need for hepatitis C screening test    Need for shingles vaccine    Bunion of great toe of left foot    Laceration of skin of forehead       Current Outpatient Medications   Medication Instructions    ergocalciferol (ERGOCALCIFEROL) 50,000 Units, Oral, Every 7 days    multivitamin capsule 1 capsule, Oral, Daily    varicella-zoster gE-AS01B, PF, (SHINGRIX, PF,) 50 mcg/0.5 mL injection Inject 0.5mL IM at 0 and 2-6 months    venlafaxine (EFFEXOR) 37.5 mg, Oral, Daily       Review of Systems:   @ROS    PHYSICAL EXAM:  LMP 10/15/2018 (Within Weeks)     ECOG   General: well appearing, in no apparent distress  HEENT: Normocephalic, EOMI, anicteric sclerae, MMM  Neck: supple, without cervical or supraclavicular lymphadenopathy.  Heart: regular rate and rhythm, normal S1 and S2, no murmurs, gallops or rubs.  Lungs: Clear to auscultation bilaterally, no increased wob  Breast:  Abdomen: Soft, nontender, nondistended with normal bowel sounds. No hepatosplenomegaly.  Extremities: No LE edema or joint effusion  Skin: warm, well-perfused, no rash  Neurologic: Alert and oriented x 4, normal speech and gait   Psychiatric: Conversing appropriately with providers throughout today's encounter.        Pertinent Labs & Imaging:  Specimen (730h ago, onward)      None            No results found for this or any previous visit (from the past 24 hour(s)).    Assessment & Plan:    There are no diagnoses linked to this encounter.      Advance Care Planning     Date: 08/02/2023  {ACP:37031}       Reviewed patients referring notes, imaging and pathology. Discussed diagnosis, staging, and treatment in detail with patient.     Route Chart for Scheduling  Med Onc Route Chart for Scheduling          Total time of this visit, including time spent face to face with patient and/or via video/audio, and also in preparing for today's visit for MDM and documentation. (Medical Decision Making, including consideration of possible diagnoses, management options, complex medical record review, review of diagnostic tests and information, consideration and discussion of significant complications based on comorbidities, and discussion with providers involved with the care of the patient) *** minutes. Greater than 50% was spent face to face with the patient counseling and coordinating care.    Sonia Johnson

## 2023-08-03 ENCOUNTER — TELEPHONE (OUTPATIENT)
Dept: INTERVENTIONAL RADIOLOGY/VASCULAR | Facility: CLINIC | Age: 53
End: 2023-08-03
Payer: COMMERCIAL

## 2023-08-03 ENCOUNTER — OFFICE VISIT (OUTPATIENT)
Dept: SURGERY | Facility: CLINIC | Age: 53
End: 2023-08-03
Payer: COMMERCIAL

## 2023-08-03 ENCOUNTER — DOCUMENTATION ONLY (OUTPATIENT)
Dept: SURGERY | Facility: CLINIC | Age: 53
End: 2023-08-03
Payer: COMMERCIAL

## 2023-08-03 ENCOUNTER — OFFICE VISIT (OUTPATIENT)
Dept: HEMATOLOGY/ONCOLOGY | Facility: CLINIC | Age: 53
End: 2023-08-03
Payer: COMMERCIAL

## 2023-08-03 VITALS
OXYGEN SATURATION: 97 % | DIASTOLIC BLOOD PRESSURE: 87 MMHG | HEART RATE: 72 BPM | WEIGHT: 168 LBS | SYSTOLIC BLOOD PRESSURE: 127 MMHG | TEMPERATURE: 98 F | BODY MASS INDEX: 27 KG/M2 | HEIGHT: 66 IN | HEART RATE: 72 BPM | HEIGHT: 66 IN | BODY MASS INDEX: 27 KG/M2 | OXYGEN SATURATION: 97 % | SYSTOLIC BLOOD PRESSURE: 127 MMHG | WEIGHT: 168 LBS | RESPIRATION RATE: 18 BRPM | DIASTOLIC BLOOD PRESSURE: 87 MMHG

## 2023-08-03 DIAGNOSIS — F17.200 TOBACCO USE DISORDER: ICD-10-CM

## 2023-08-03 DIAGNOSIS — C50.912 INFILTRATING DUCTAL CARCINOMA OF LEFT BREAST: Primary | ICD-10-CM

## 2023-08-03 DIAGNOSIS — C50.212 MALIGNANT NEOPLASM OF UPPER-INNER QUADRANT OF LEFT BREAST IN FEMALE, ESTROGEN RECEPTOR NEGATIVE: Primary | ICD-10-CM

## 2023-08-03 DIAGNOSIS — Z17.1 MALIGNANT NEOPLASM OF UPPER-INNER QUADRANT OF LEFT BREAST IN FEMALE, ESTROGEN RECEPTOR NEGATIVE: Primary | ICD-10-CM

## 2023-08-03 DIAGNOSIS — C50.919 BREAST CANCER: Primary | ICD-10-CM

## 2023-08-03 DIAGNOSIS — C50.912 INFILTRATING DUCTAL CARCINOMA OF LEFT BREAST: ICD-10-CM

## 2023-08-03 LAB
FINAL PATHOLOGIC DIAGNOSIS: NORMAL
GROSS: NORMAL
Lab: NORMAL
SUPPLEMENTAL DIAGNOSIS: NORMAL

## 2023-08-03 PROCEDURE — 3079F PR MOST RECENT DIASTOLIC BLOOD PRESSURE 80-89 MM HG: ICD-10-PCS | Mod: CPTII,S$GLB,, | Performed by: STUDENT IN AN ORGANIZED HEALTH CARE EDUCATION/TRAINING PROGRAM

## 2023-08-03 PROCEDURE — 3044F PR MOST RECENT HEMOGLOBIN A1C LEVEL <7.0%: ICD-10-PCS | Mod: CPTII,S$GLB,, | Performed by: STUDENT IN AN ORGANIZED HEALTH CARE EDUCATION/TRAINING PROGRAM

## 2023-08-03 PROCEDURE — 99205 OFFICE O/P NEW HI 60 MIN: CPT | Mod: S$GLB,,, | Performed by: STUDENT IN AN ORGANIZED HEALTH CARE EDUCATION/TRAINING PROGRAM

## 2023-08-03 PROCEDURE — 3044F HG A1C LEVEL LT 7.0%: CPT | Mod: CPTII,S$GLB,, | Performed by: STUDENT IN AN ORGANIZED HEALTH CARE EDUCATION/TRAINING PROGRAM

## 2023-08-03 PROCEDURE — 3079F PR MOST RECENT DIASTOLIC BLOOD PRESSURE 80-89 MM HG: ICD-10-PCS | Mod: CPTII,S$GLB,, | Performed by: SURGERY

## 2023-08-03 PROCEDURE — 99999 PR PBB SHADOW E&M-EST. PATIENT-LVL III: CPT | Mod: PBBFAC,,, | Performed by: SURGERY

## 2023-08-03 PROCEDURE — 99999 PR PBB SHADOW E&M-EST. PATIENT-LVL III: ICD-10-PCS | Mod: PBBFAC,,, | Performed by: SURGERY

## 2023-08-03 PROCEDURE — 99205 PR OFFICE/OUTPT VISIT, NEW, LEVL V, 60-74 MIN: ICD-10-PCS | Mod: S$GLB,,, | Performed by: STUDENT IN AN ORGANIZED HEALTH CARE EDUCATION/TRAINING PROGRAM

## 2023-08-03 PROCEDURE — 99999 PR PBB SHADOW E&M-EST. PATIENT-LVL IV: ICD-10-PCS | Mod: PBBFAC,,, | Performed by: STUDENT IN AN ORGANIZED HEALTH CARE EDUCATION/TRAINING PROGRAM

## 2023-08-03 PROCEDURE — 3044F HG A1C LEVEL LT 7.0%: CPT | Mod: CPTII,S$GLB,, | Performed by: SURGERY

## 2023-08-03 PROCEDURE — 3074F PR MOST RECENT SYSTOLIC BLOOD PRESSURE < 130 MM HG: ICD-10-PCS | Mod: CPTII,S$GLB,, | Performed by: SURGERY

## 2023-08-03 PROCEDURE — 3008F PR BODY MASS INDEX (BMI) DOCUMENTED: ICD-10-PCS | Mod: CPTII,S$GLB,, | Performed by: SURGERY

## 2023-08-03 PROCEDURE — 3074F SYST BP LT 130 MM HG: CPT | Mod: CPTII,S$GLB,, | Performed by: SURGERY

## 2023-08-03 PROCEDURE — 99205 PR OFFICE/OUTPT VISIT, NEW, LEVL V, 60-74 MIN: ICD-10-PCS | Mod: S$GLB,,, | Performed by: SURGERY

## 2023-08-03 PROCEDURE — 3044F PR MOST RECENT HEMOGLOBIN A1C LEVEL <7.0%: ICD-10-PCS | Mod: CPTII,S$GLB,, | Performed by: SURGERY

## 2023-08-03 PROCEDURE — 3079F DIAST BP 80-89 MM HG: CPT | Mod: CPTII,S$GLB,, | Performed by: SURGERY

## 2023-08-03 PROCEDURE — 99205 OFFICE O/P NEW HI 60 MIN: CPT | Mod: S$GLB,,, | Performed by: SURGERY

## 2023-08-03 PROCEDURE — 3008F BODY MASS INDEX DOCD: CPT | Mod: CPTII,S$GLB,, | Performed by: SURGERY

## 2023-08-03 PROCEDURE — 3074F SYST BP LT 130 MM HG: CPT | Mod: CPTII,S$GLB,, | Performed by: STUDENT IN AN ORGANIZED HEALTH CARE EDUCATION/TRAINING PROGRAM

## 2023-08-03 PROCEDURE — 3074F PR MOST RECENT SYSTOLIC BLOOD PRESSURE < 130 MM HG: ICD-10-PCS | Mod: CPTII,S$GLB,, | Performed by: STUDENT IN AN ORGANIZED HEALTH CARE EDUCATION/TRAINING PROGRAM

## 2023-08-03 PROCEDURE — 3008F PR BODY MASS INDEX (BMI) DOCUMENTED: ICD-10-PCS | Mod: CPTII,S$GLB,, | Performed by: STUDENT IN AN ORGANIZED HEALTH CARE EDUCATION/TRAINING PROGRAM

## 2023-08-03 PROCEDURE — 99999 PR PBB SHADOW E&M-EST. PATIENT-LVL IV: CPT | Mod: PBBFAC,,, | Performed by: STUDENT IN AN ORGANIZED HEALTH CARE EDUCATION/TRAINING PROGRAM

## 2023-08-03 PROCEDURE — 3079F DIAST BP 80-89 MM HG: CPT | Mod: CPTII,S$GLB,, | Performed by: STUDENT IN AN ORGANIZED HEALTH CARE EDUCATION/TRAINING PROGRAM

## 2023-08-03 PROCEDURE — 3008F BODY MASS INDEX DOCD: CPT | Mod: CPTII,S$GLB,, | Performed by: STUDENT IN AN ORGANIZED HEALTH CARE EDUCATION/TRAINING PROGRAM

## 2023-08-03 NOTE — PROGRESS NOTES
[facility]/ [facility]      Chief Complaint:   Encounter Diagnoses   Name Primary?    Infiltrating ductal carcinoma of left breast Yes    Tobacco use disorder         Cancer Staging   Invasive ductal carcinoma of breast  Staging form: Breast, AJCC 8th Edition  - Clinical stage from 2023: Stage IB (cT1c, cN0, cM0, G3, ER-, UT-, HER2-) - Signed by Sonia Johnson MD on 2023      HPI:  Teresa Valdes is a 52 y.o. female who presents today for evaluation of newly diagnosed breast cancer. Her oncologic history is as follows:    -screening MMG 23 showing an asymmetry in the central L breast. Diagnostic MMG/US revealing a 19 x 12 x17mm complex cystic and solid mass seen in the L breast at 11oclock. No left axillary LAD   -23 biopsy confirming IDC, grade 3. ER negative, UT negative, Her2 2+, negative FISH. Ki67 80%  -23 MRI breast showed 1.6 x 1.0 x 1.2 cm left breast mass with no associated lymphadenopathy.    Today, she presents with her sister, mother and fiancee for evaluation. She states that she overall feels well. She says that she felt a left breast lump about a week prior to screening mammogram. She states that it came on quickly, grew and was painful on palpation. Regarding her family history, she had a paternal grandmother who had breast cancer. She lives in [place], [place]. She works as a .     Gyn History:   Menarche: 14  Menopause: 46    Age at first pregnancy: 18  : Yes, with second child    Social History     Tobacco Use    Smoking status: Every Day     Current packs/day: 0.50     Average packs/day: 0.5 packs/day for 6.4 years (3.2 ttl pk-yrs)     Types: Cigarettes     Start date: 3/7/2017    Smokeless tobacco: Never   Substance Use Topics    Alcohol use: Yes     Alcohol/week: 2.0 standard drinks of alcohol     Types: 2 Glasses of wine per week     Comment: occ    Drug use:  Never     Family History   Problem Relation Age of Onset    Arthritis Mother     Hypertension Father     Heart disease Father     Breast cancer Paternal Grandmother     Cancer Neg Hx      Past Medical History:   Diagnosis Date    Anxiety     Depression     Hypertension     resolved    Ovarian cancer     Sebaceous cyst 4/2/2018     Past Surgical History:   Procedure Laterality Date    ADENOIDECTOMY      BREAST CYST EXCISION Right     BREAST SURGERY      Abscess     CHOLECYSTECTOMY      COLONOSCOPY N/A 4/11/2023    Procedure: COLONOSCOPY;  Surgeon: Mona Valverde MD;  [facility];  Service: Endoscopy;  Laterality: N/A;    CYST REMOVAL      on back    cyst removed left wrist      KNEE LIGAMENT RECONSTRUCTION Left     sleeve gastroectomy  06/2017    in [place]    TONSILLECTOMY      Uvuloplasty         Patient Active Problem List   Diagnosis    Encounter for colorectal cancer screening    Anxiety and depression    Overweight (BMI 25.0-29.9)    Need for hepatitis C screening test    Need for shingles vaccine    Bunion of great toe of left foot    Laceration of skin of forehead    Invasive ductal carcinoma of breast       Current Outpatient Medications   Medication Instructions    ergocalciferol (ERGOCALCIFEROL) 50,000 Units, Oral, Every 7 days    multivitamin capsule 1 capsule, Oral, Daily    varicella-zoster gE-AS01B, PF, (SHINGRIX, PF,) 50 mcg/0.5 mL injection Inject 0.5mL IM at 0 and 2-6 months    venlafaxine (EFFEXOR) 37.5 mg, Oral, Daily     Review of Systems:   GENERAL: negative for fatigue/weakness, fever, and weight loss  EYES: negative for  blurring and eye pain  ENT: negative for decreased hearing and nasal congestion  CV: negative for chest pains and dyspnea on exertion  RESPIRATORY: negative for cough and dyspnea on exertion  GI: negative for abdominal pain and nausea  MUSCULOSKELETAL: negative for back pain and restless legs  NEURO: negative for frequent headaches and seizures  PSYCH: negative for  "confusion and depression  ENDO: negative for cold intolerance and polyphagia  HEM: negative for abnormal bruising and enlarged lymph nodes    PHYSICAL EXAM:  /87   Pulse 72   Temp 98.3 °F (36.8 °C) (Oral)   Resp 18   Ht 5' 6" (1.676 m)   Wt 76.2 kg (167 lb 15.9 oz)   LMP 10/15/2018 (Within Weeks)   SpO2 97%   BMI 27.11 kg/m²     ECOG 0  General: well appearing, in no apparent distress  HEENT: Normocephalic, EOMI, anicteric sclerae, MMM  Neck: supple, without cervical or supraclavicular lymphadenopathy.  Heart: regular rate and rhythm, normal S1 and S2, no murmurs, gallops or rubs.  Lungs: Clear to auscultation bilaterally, no increased wob  Breast: Left upper breast mass palpated, non-tender to palpation, non-mobile  Abdomen: Soft, nontender, nondistended with normal bowel sounds. No hepatosplenomegaly.  Extremities: No LE edema or joint effusion  Skin: warm, well-perfused, no rash  Neurologic: Alert and oriented x 4, normal speech and gait   Psychiatric: Conversing appropriately with providers throughout today's encounter.    Pertinent Labs & Imaging:  Specimen (730h ago, onward)      None            No results found for this or any previous visit (from the past 24 hour(s)).    Assessment & Plan:    1. Infiltrating ductal carcinoma of left breast    Reviewed patients referring notes, imaging and pathology. Discussed diagnosis, staging, and treatment in detail with patient. We discussed that for early stage, smaller (<2cm) triple negative breast cancer, the standard care of therapy is neoadjuvant chemotherapy followed by surgery and then possibly +/- radiation, +/- chemotherapy pill pending surgery results. We discussed that triple negative breast cancer is more likely to be responsive to chemotherapy.     We reviewed the details of neoadjuvant dose dense chemotherapy. We did discuss chemotherapy with doxorubicin and cyclophosphamide every two weeks with growth factor after every cycle followed by " paclitaxel weekly for 12 weeks.     Overall side effects of chemotherapy were discussed including alopecia, neutropenia leading top increased risk of infection, anemia, thrombocytopenia, nausea, vomiting and neuropathy. Other side effects such as fatigue, nail changes, mouth sores, and bone pain were also reviewed. The need for growth factor support was also discussed. Neulasta related bone pain was discussed. Rare but serious side effects such as increased risk of secondary leukemia with both doxorubicin and cyclophosphamide, and increased risk of cardiomyopathy secondary to doxorubicin were also discussed. Will schedule an echo and port placement prior to treatment initiation.     Follow up in 2 weeks to start C1 dd-AC with labs prior.   Referral to Heme/Onc Psychology and nutrition.    2. Tobacco use disorder  Smoking about 0.5 packs/1 pack per day for the past 20 years. Patient wants to quit on her own. She is interested in smoking cessation program if cannot quit.      Route Chart for Scheduling    Med Onc Chart Routing      Follow up with physician 2 weeks. Follow up in 2 weeks prior to C1 dd-AC   Follow up with DARIANA    Infusion scheduling note    Injection scheduling note    Labs CMP and CBC   Scheduling:  Preferred lab:  Lab interval:  CBC, CMP prior to visit   Imaging ECHO   Needs 2D echo scheduled   Pharmacy appointment    Other referrals     Additional referrals needed  IR for port placement, Heme/Onc psych, nutrition            Total time of this visit, including time spent face to face with patient and/or via video/audio, and also in preparing for today's visit for MDM and documentation. (Medical Decision Making, including consideration of possible diagnoses, management options, complex medical record review, review of diagnostic tests and information, consideration and discussion of significant complications based on comorbidities, and discussion with providers involved with the care of the patient) 60  minutes. Greater than 50% was spent face to face with the patient counseling and coordinating care.    Discussed with attending, Dr. Sonia Johnson.    Kenna Ramirez MD   Hematology and Oncology Fellow, PGY V         I personally interviewed and examined this patient today with  and agree with the assessment and plan set forth in her note.    Ms. Valdes is a dimitry 53yo woman with recently diagnosed cT1cN0 TNBC. We reviewed the natural history of TNBC; given disease <2cm would favor proceeding with naddAC-T. Reviewed benefit of assessing treatment response at the time of surgery and data for xeloda in patients w residual disease. Will work to arrange port placement, echo and chemo education. Plan to see her back prior to C1D1.    Sonia Johnson MD

## 2023-08-03 NOTE — PROGRESS NOTES
Genetics Lay Navigator Note:    Nurse Navigator : MARLENE Peres RN    MULTI-D PT. :  Surg Onc. : Hua  Med Onc. : Elizabeth    Met with patient at her consult with Dr. Sequeira and Dr. Johnson (8/3/2023), to facilitate genetic testing. Patient declined genetic testing on today and scheduled an appointment for 8/10/2023 to complete genetic testing. Instructed patient to call [facility] genetic counselor over the phone to complete counseling prior to appointment on 8/10/2023. Patient verbalized understanding to all information given to her today. Provided patient with [facility] genetic counselor phone number to call as well as my card. Encouraged patient to call me at any time with questions.

## 2023-08-03 NOTE — Clinical Note
I am seeing her for a new diagnosis of breast cancer.  She has a relatively small triple negative tumor that will require neoadjuvant chemotherapy followed by surgery then possible radiation.  She is also motivated to quit smoking to improve her surgical outcomes.    Thanks for sending her! Keke Sequeira MD Breast Surgical Oncology

## 2023-08-03 NOTE — PROGRESS NOTES
Breast Surgery  [facility]  Department of Surgery      REFERRING PROVIDER: Maryse Abreu MD  [address]  [place]  [place] [address]    Chief Complaint: Breast Cancer (New Patient Breast Cancer Invasive Ductal Carcinoma .)      Subjective:      Patient ID: Teresa Valdes is a 52 y.o. female who presents with newly diagnosed invasive ductal carcinoma of the left breast.     Follow-up mammogram/US (23) showed a 19 mm complex cystic and solid mass seen in the central region of the left breast at 11 OC 8 CFN. A ultrasound guided biopsy was performed on 23 with pathology revealing infiltrating ductal carcinoma of the breast. MRI breast 23 1.6 cm at 11 OC 7.6 CFN.    Patient does routinely do self breast exams.  Patient has noted a change on breast exam.  Patient denies nipple discharge. Patient denies to previous breast biopsy. Patient denies a personal history of breast cancer.    Current 0.5 pack a day smoker.     Findings at that time were the following:   Tumor size: 1.6 cm   Tumor thgthrthathdtheth:th th4th Estrogen Receptor: -   Progesterone Receptor: -   Her-2 sue: -   Lymph node status: clinically negative    Lymphatic invasion: not identified     GYN History:  Age of menarche was 14.  Menopause:48  Patient denies hormonal therapy.   Patient is .   Age of first live birth was 18.   Patient did not use hormone therapy   Patient did not breast feed.    Past Medical History:   Diagnosis Date    Anxiety     Depression     Hypertension     resolved    Ovarian cancer     Sebaceous cyst 2018     Past Surgical History:   Procedure Laterality Date    ADENOIDECTOMY      BREAST CYST EXCISION Right     BREAST SURGERY      Abscess     CHOLECYSTECTOMY      COLONOSCOPY N/A 2023    Procedure: COLONOSCOPY;  Surgeon: Mona Valverde MD;  Location: [facility];  Service: Endoscopy;  Laterality: N/A;    CYST REMOVAL      on back    cyst removed left wrist      KNEE LIGAMENT RECONSTRUCTION Left      sleeve gastroectomy  06/2017    in [place]    TONSILLECTOMY      Uvuloplasty       Current Outpatient Medications on File Prior to Visit   Medication Sig Dispense Refill    multivitamin capsule Take 1 capsule by mouth once daily.      venlafaxine (EFFEXOR) 37.5 MG Tab Take 1 tablet (37.5 mg total) by mouth once daily. 90 tablet 3    ergocalciferol (ERGOCALCIFEROL) 50,000 unit Cap Take 1 capsule (50,000 Units total) by mouth every 7 days. 12 capsule 0    varicella-zoster gE-AS01B, PF, (SHINGRIX, PF,) 50 mcg/0.5 mL injection Inject 0.5mL IM at 0 and 2-6 months 1 each 1     No current facility-administered medications on file prior to visit.     Social History     Socioeconomic History    Marital status:    Occupational History     Employer: [employer]   Tobacco Use    Smoking status: Every Day     Current packs/day: 0.50     Average packs/day: 0.5 packs/day for 6.4 years (3.2 ttl pk-yrs)     Types: Cigarettes     Start date: 3/7/2017    Smokeless tobacco: Never   Substance and Sexual Activity    Alcohol use: Yes     Alcohol/week: 2.0 standard drinks of alcohol     Types: 2 Glasses of wine per week     Comment: occ    Drug use: Never    Sexual activity: Yes     Partners: Male     Birth control/protection: Partner-Vasectomy     Comment:    Social History Narrative    She teaches Band at middle school. Lives with her  and 2 children in [place] [facility]. She has an older daughter who she gave up for adoption. Exercising.      Family History   Problem Relation Age of Onset    Arthritis Mother     Hypertension Father     Heart disease Father     Breast cancer Paternal Grandmother     Cancer Neg Hx         Review of Systems   Constitutional:  Negative for appetite change, chills, fever and unexpected weight change.   HENT:  Negative for facial swelling, postnasal drip and sore throat.    Eyes:  Negative for redness and itching.   Respiratory:  Negative for chest tightness  "and shortness of breath.    Cardiovascular:  Negative for chest pain and palpitations.   Gastrointestinal:  Negative for blood in stool, diarrhea, nausea and vomiting.   Genitourinary:  Negative for difficulty urinating and dysuria.   Musculoskeletal:  Negative for arthralgias and joint swelling.   Skin:  Negative for rash and wound.   Neurological:  Negative for dizziness and syncope.   Hematological:  Negative for adenopathy.   Psychiatric/Behavioral:  Negative for agitation. The patient is not nervous/anxious.      Objective:   /87 (BP Location: Right arm, Patient Position: Sitting, BP Method: Medium (Automatic))   Pulse 72   Ht 5' 6" (1.676 m)   Wt 76.2 kg (168 lb)   LMP 10/15/2018 (Within Weeks)   SpO2 97%   BMI 27.12 kg/m²     Physical Exam   Vitals reviewed.  Constitutional: She is oriented to person, place, and time.   HENT:   Head: Normocephalic and atraumatic.   Nose: Nose normal.   Eyes: Pupils are equal, round, and reactive to light. Right eye exhibits no discharge. Left eye exhibits no discharge.   Pulmonary/Chest: Effort normal and breath sounds normal. No stridor. No respiratory distress. She exhibits no mass, no tenderness and no edema. Right breast exhibits no inverted nipple, no mass, no nipple discharge, no skin change and no tenderness. Left breast exhibits no inverted nipple, no mass, no nipple discharge, no skin change and no tenderness. No breast swelling or bleeding. Breasts are symmetrical.   Abdominal: Normal appearance.   Genitourinary: No breast swelling or bleeding.   Neurological: She is alert and oriented to person, place, and time.   Skin: Skin is warm and dry.     Psychiatric: Her behavior is normal. Mood, judgment and thought content normal.       Radiology review: Images personally reviewed by me in the clinic.     4/13/23 Bilateral Screening Mammo:    Findings:  This procedure was performed using tomosynthesis. Computer-aided detection was utilized in the " interpretation of this examination.  The breasts are extremely dense, which lowers the sensitivity of mammography.      Left  There is an asymmetry seen in the central region of the left breast.      Right  There is no evidence of suspicious masses, calcifications, or other abnormal findings in the right breast.     Impression:  Left  Asymmetry: Left breast asymmetry at the central position. Assessment: 0 - Incomplete. Diagnostic Mammogram and/or Ultrasound is recommended.      Right  There is no mammographic evidence of malignancy in the right breast.     BI-RADS Category:   Overall: 0 - Incomplete: Needs Additional Imaging Evaluation        Recommendation:  Diagnostic mammogram with possible ultrasound (if indicated) is recommended.    6/27/23 Left Diagnostic Mammo/US:    Findings:  This procedure was performed using tomosynthesis. Computer-aided detection was utilized in the interpretation of this examination.  The left breast is extremely dense, which lowers the sensitivity of mammography.      Mammo Digital Diagnostic Left with German  Left  Mass: There is a mass seen in the central region of the left breast. Compared to the previous study, the mass increased in size.      US Breast Left Limited  Left  Mass: There is a 19 mm x 12 mm x 17 mm irregularly shaped, complex cystic and solid mass seen in the central region of the left breast at 11 o'clock, 8 cm from the nipple. Compared to the previous study, the mass increased in size.       No left axillary adenopathy identified.     Impression:  Left  Mass: Left breast mass at the central position. Assessment: 4 - Suspicious finding. Biopsy is recommended.      BI-RADS Category:   Overall: 4 - Suspicious     Recommendation:  Biopsy is recommended.      These findings and recommendations were discussed with the patient at the time of the examination.    7/31/23 MRI Breast:     Findings:  The breasts are heterogeneously dense. There is mild background parenchymal  enhancement.     Left:   Irregular mass  11 o'clock position 7.6 cm from the nipple, 1.2 cm from the skin and 3.6 cm from the chest wall measuring 1.6 x 1.0 x 1.2 cm corresponding to known malignancy. Associated signal void from a biopsy marker; pathology showed invasive ductal carcinoma.      No enlarged axillary or internal mammary lymph nodes.      Right:   There is no evidence of suspicious masses, abnormal enhancement, or other abnormal findings. No enlarged axillary or internal mammary lymph nodes.      Incidental:     Right anterior hepatic 2.5 cm cyst.     Impression:  Left  Left breast 1.6 x 1.0 x 1.2 cm mass at 11 o'clock in keeping with known malignancy.      Right  There is no MR evidence of malignancy.     BI-RADS Category:   Overall: 6 - Known Biopsy-Proven Malignancy    Assessment:       1. Malignant neoplasm of lower-inner quadrant of left breast in female, estrogen receptor negative        Plan:     Options for management were discussed with the patient and her family. We reviewed the existing data noting the equivalency of breast conserving surgery with radiation therapy and mastectomy. We also reviewed the guidelines of the National Comprehensive Cancer Network for Stage I breast carcinoma. We discussed the need for lumpectomy margins to be negative for carcinoma, the necessity for postoperative radiation therapy after breast conservation in most cases, the possibility of a failed or false negative sentinel lymph node biopsy and the potential need for complete lymphadenectomy for a failed or positive sentinel lymph node biopsy were fully discussed. In the setting of mastectomy, delayed or immediate reconstruction options are available and were discussed.     In the setting of lumpectomy, radiation therapy would be recommended majority of the time.  The duration and treatment side effects were discussed with the patient.  This will coordinated with the radiation oncologist pending final  pathology.    We also discussed the role of systemic therapy in the treatment of early stage breast cancer.  We discussed that this is based on tumor biology and karri status and will be determined based on final pathology.  We discussed that if the cancer is hormone positive, endocrine therapy would be recommended in most cases and its use can reduce the risk of recurrence as well as improve survival. Side effects of treatment were briefly discussed. We also discussed the potential role for chemotherapy based on a number of factors such as tumor phenotype (ER+ vs. triple negative vs. Wxa1sko+) and this would be determined in coordination with the medical oncologist.    TNBC, recommend proceeding with NACT.   Will follow up after completion of chemotherapy.     The patient, in consultation with her family, has elected to proceed with additional workup and medical management upfront. The operative risks of bleeding, infection, recurrence, scarring, and anesthetic complications and the possibility of requiring further surgery were all noted.  Smoking cessation discussed at length,.  She understands smoking make her a poor candidate for reconstruction if she opts for mastectomy.  See DARIANA during chemo then me at completion with repeat MRI    Patient was educated on breast cancer, receptors, wire localization lumpectomy, mastectomy, sentinel lymph node mapping and biopsy, axillary lymph node dissection, reconstruction, breast prosthesis with post-mastectomy bra and radiation therapy. Patient was given patient information binder including [facility] breast cancer treatment brochure.  All her questions were answered.      65 minutes were spent on this encounter, 45 of which was face to face counseling and 15 minutes were spent on chart review and coordination of care.

## 2023-08-04 ENCOUNTER — DOCUMENTATION ONLY (OUTPATIENT)
Dept: SURGERY | Facility: CLINIC | Age: 53
End: 2023-08-04
Payer: COMMERCIAL

## 2023-08-04 RX ORDER — ONDANSETRON HYDROCHLORIDE 8 MG/1
8 TABLET, FILM COATED ORAL EVERY 12 HOURS PRN
Qty: 30 TABLET | Refills: 2 | Status: SHIPPED | OUTPATIENT
Start: 2023-08-04 | End: 2024-02-27 | Stop reason: CLARIF

## 2023-08-04 RX ORDER — LIDOCAINE AND PRILOCAINE 25; 25 MG/G; MG/G
CREAM TOPICAL
Qty: 5 G | Refills: 1 | Status: SHIPPED | OUTPATIENT
Start: 2023-08-04 | End: 2023-11-16 | Stop reason: SDUPTHER

## 2023-08-04 RX ORDER — DEXAMETHASONE 4 MG/1
TABLET ORAL
Qty: 40 TABLET | Refills: 0 | Status: SHIPPED | OUTPATIENT
Start: 2023-08-04 | End: 2024-02-27 | Stop reason: CLARIF

## 2023-08-04 RX ORDER — OLANZAPINE 5 MG/1
TABLET ORAL
Qty: 30 TABLET | Refills: 2 | Status: SHIPPED | OUTPATIENT
Start: 2023-08-04 | End: 2024-02-27 | Stop reason: CLARIF

## 2023-08-04 NOTE — NURSING
Nurse Navigator Note:     Met with patient during her consult with Dr. Sequeira.  Patient and I reviewed the information she discussed with Dr. Sequeira, including treatment options, diagnosis, and future plans for workup. Patient and I went through the new patient booklet, explained some of the information and why it is provided.     Also offered patient consults with our other specialty clinics: Integrative Oncology, Survivorship and/or Women's Gynecologic needs, our breast physical therapy department for pre-op and post-operative assessments, Oncologic Psychology for psychological support, and Oncologic Nutrition for nutritional counseling. Explained to patient that all of these support services are completely optional. Discussed that physical therapy may call patient to offer pre-op appt, and what that appt would entail.     Patient was given a copy of her appointments, Dr. Sequeira's card, and my card. Encouraged her to call me if she has any questions or concerns or would like to schedule any additional appointments. Verbalized understanding of all information.    Genetics done at visit. Referrals placed to PT and integrative oncology. ECHO and chemo education scheduled. E mail added to support group.  Oncology Navigation   Intake  Date of Diagnosis: 07/24/23  Cancer Type: Breast  Internal / External Referral: Internal  Date of Referral: 06/28/23  Initial Nurse Navigator Contact: 07/27/23  Referral to Initial Contact Timeline (days): 29  Date Worked: 08/04/23  First Appointment Available: 08/03/23  Appointment Date: 08/03/23  First Available Date vs. Scheduled Date (days): 0     Treatment  Current Status: Active    Surgical Oncologist: Hua  Consult Date: 08/03/23    Medical Oncologist: Elizabeth  Consult Date: 08/03/23  Chemotherapy: Planned  Chemotherapy Regimen: dd AC-T       Procedures: Genetic test; Echo  Biopsy Schedule Date: 07/24/23  Echo Schedule Date: 08/10/23  Genetic Testing Date Sent:  08/03/23  Diagnostic Mammo Schedule Date: 06/27/23  MRI Schedule Date: 07/31/23  Ultrasound Schedule Date: 06/27/23    Physical Therapy Referral Date: 08/04/23    ER: Negative  AL: Negative  Her2: -- (Fish)       Support Systems: Family members; Spouse/significant other     Acuity  Treatment Tolerability: Has not started treatment yet/treatment fully completed and side effects resolved  Hospitalization Within the Past Month: 0   Needed: 0  Support: 0  Verbalizes Financial Concerns: 0  Transportation: 0  Psychological Factors (+1 each): Emotional during conversation  History of noncompliance/frequent no shows and cancellations: 0  Verbalizes the need for more education: 0  Navigation Acuity: 2     Follow Up  No follow-ups on file.

## 2023-08-07 ENCOUNTER — TELEPHONE (OUTPATIENT)
Dept: INFUSION THERAPY | Facility: HOSPITAL | Age: 53
End: 2023-08-07
Payer: COMMERCIAL

## 2023-08-07 ENCOUNTER — TELEPHONE (OUTPATIENT)
Dept: INTERVENTIONAL RADIOLOGY/VASCULAR | Facility: CLINIC | Age: 53
End: 2023-08-07
Payer: COMMERCIAL

## 2023-08-07 PROBLEM — C50.212 MALIGNANT NEOPLASM OF UPPER-INNER QUADRANT OF LEFT BREAST IN FEMALE, ESTROGEN RECEPTOR NEGATIVE: Status: ACTIVE | Noted: 2023-08-07

## 2023-08-07 PROBLEM — C50.312 MALIGNANT NEOPLASM OF LOWER-INNER QUADRANT OF LEFT BREAST IN FEMALE, ESTROGEN RECEPTOR NEGATIVE: Status: ACTIVE | Noted: 2023-08-07

## 2023-08-07 PROBLEM — Z17.1 MALIGNANT NEOPLASM OF LOWER-INNER QUADRANT OF LEFT BREAST IN FEMALE, ESTROGEN RECEPTOR NEGATIVE: Status: ACTIVE | Noted: 2023-08-07

## 2023-08-07 NOTE — PROGRESS NOTES
[facility]/ [facility]      Chief Complaint:   Encounter Diagnosis   Name Primary?    Infiltrating ductal carcinoma of left breast Yes        Cancer Staging   Invasive ductal carcinoma of breast  Staging form: Breast, AJCC 8th Edition  - Clinical stage from 2023: Stage IB (cT1c, cN0, cM0, G3, ER-, IA-, HER2-) - Signed by Sonia Johnson MD on 2023      HPI:  Teresa Valdes is a 52 y.o. female who presents today for evaluation of newly diagnosed breast cancer. Her oncologic history is as follows:    -screening MMG 23 showing an asymmetry in the central L breast. Diagnostic MMG/US revealing a 19 x 12 x17mm complex cystic and solid mass seen in the L breast at 11oclock. No left axillary LAD   -23 biopsy confirming IDC, grade 3. ER negative, IA negative, Her2 2+, negative FISH. Ki67 80%  -23 MRI breast showed 1.6 x 1.0 x 1.2 cm left breast mass with no associated lymphadenopathy.    Today, she presents for chemo education.     Gyn History:   Menarche: 14  Menopause: 46    Age at first pregnancy: 18  : Yes, with second child    Social History     Tobacco Use    Smoking status: Every Day     Current packs/day: 0.50     Average packs/day: 0.5 packs/day for 6.4 years (3.2 ttl pk-yrs)     Types: Cigarettes     Start date: 3/7/2017    Smokeless tobacco: Never   Substance Use Topics    Alcohol use: Yes     Alcohol/week: 2.0 standard drinks of alcohol     Types: 2 Glasses of wine per week     Comment: occ    Drug use: Never     Family History   Problem Relation Age of Onset    Arthritis Mother     Hypertension Father     Heart disease Father     Breast cancer Paternal Grandmother     Cancer Neg Hx      Past Medical History:   Diagnosis Date    Anxiety     Depression     Hypertension     resolved    Ovarian cancer     Sebaceous cyst 2018     Past Surgical History:   Procedure Laterality Date    ADENOIDECTOMY      BREAST  CYST EXCISION Right     BREAST SURGERY      Abscess     CHOLECYSTECTOMY      COLONOSCOPY N/A 4/11/2023    Procedure: COLONOSCOPY;  Surgeon: Mona Valverde MD;  Location: [facility];  Service: Endoscopy;  Laterality: N/A;    CYST REMOVAL      on back    cyst removed left wrist      KNEE LIGAMENT RECONSTRUCTION Left     sleeve gastroectomy  06/2017    in [place]    TONSILLECTOMY      Uvuloplasty         Patient Active Problem List   Diagnosis    Encounter for colorectal cancer screening    Anxiety and depression    Overweight (BMI 25.0-29.9)    Need for hepatitis C screening test    Need for shingles vaccine    Bunion of great toe of left foot    Laceration of skin of forehead    Invasive ductal carcinoma of breast       Current Outpatient Medications   Medication Instructions    dexAMETHasone (DECADRON) 4 MG Tab Take 8mg (2 pills) po daily on days 2-4 of chemotherapy. Only for cycles 1-4    LIDOcaine-prilocaine (EMLA) cream Topical (Top), As needed (PRN)    multivitamin capsule 1 capsule, Oral, Daily    OLANZapine (ZYPREXA) 5 MG tablet Take 5mg nightly on days 1-4 of chemotherapy    ondansetron (ZOFRAN) 8 mg, Oral, Every 12 hours PRN    venlafaxine (EFFEXOR) 37.5 mg, Oral, Daily     Review of Systems:   GENERAL: negative for fatigue/weakness, fever, and weight loss  EYES: negative for  blurring and eye pain  ENT: negative for decreased hearing and nasal congestion  CV: negative for chest pains and dyspnea on exertion  RESPIRATORY: negative for cough and dyspnea on exertion  GI: negative for abdominal pain and nausea  MUSCULOSKELETAL: negative for back pain and restless legs  NEURO: negative for frequent headaches and seizures  PSYCH: negative for confusion and depression  ENDO: negative for cold intolerance and polyphagia  HEM: negative for abnormal bruising and enlarged lymph nodes    PHYSICAL EXAM:  LMP 10/15/2018 (Within Weeks)     ECOG 0  General: well appearing, in no apparent distress  HEENT: Normocephalic,  EOMI, anicteric sclerae, MMM  Skin: warm, well-perfused, no rash  Neurologic: Alert and oriented x 4, normal speech and gait   Psychiatric: Conversing appropriately with providers throughout today's encounter.      Assessment & Plan:    1. Infiltrating ductal carcinoma of left breast    Reviewed patients referring notes, imaging and pathology. Discussed diagnosis, staging, and treatment in detail with patient. We discussed that for early stage, smaller (<2cm) triple negative breast cancer, the standard care of therapy is neoadjuvant chemotherapy followed by surgery and then possibly +/- radiation, +/- chemotherapy pill pending surgery results. We discussed that triple negative breast cancer is more likely to be responsive to chemotherapy.     We reviewed the details of neoadjuvant dose dense chemotherapy. We did discuss chemotherapy with doxorubicin and cyclophosphamide every two weeks with growth factor after every cycle followed by paclitaxel weekly for 12 weeks.     Overall side effects of chemotherapy were discussed including alopecia, neutropenia leading top increased risk of infection, anemia, thrombocytopenia, nausea, vomiting and neuropathy. Other side effects such as fatigue, nail changes, mouth sores, and bone pain were also reviewed. The need for growth factor support was also discussed. Neulasta related bone pain was discussed. Rare but serious side effects such as increased risk of secondary leukemia with both doxorubicin and cyclophosphamide, and increased risk of cardiomyopathy secondary to doxorubicin were also discussed. Will schedule an echo and port placement prior to treatment initiation.     Follow up in 2 weeks to start C1 dd-AC with labs prior.       2. Tobacco use disorder  Smoking about 0.5 packs/1 pack per day for the past 20 years. Patient wants to quit on her own. She is interested in smoking cessation program if cannot quit.     Teresa Valdes was consented for chemotherapy/immunotherapy  to treat breast cancer. Teresa Valeds was consented to receive adriamycin, cytoxan and taxol.   The consent was discussed and reviewed with patient and .     2. Patient was education on what to expect when receiving chemotherapy including: checking in, receiving an ID band, 1 guest allowed in the infusion suite during infusion, can alternate people as well, pole going with you once you are hooked up, warm blankets are available, you may bring lunch and snacks, minimal snacks are available, take medications as regularly unless told otherwise, warm blankets, will be available. Education about what to expect during their chemo cycle and how often their regimen is given.     3. An extensive discussion was had which included a thorough discussion of the risk and benefits of treatment and alternatives.  Risks, including but not limited to, possible hair loss, bone marrow damage (anemia, thrombocytopenia, immune suppression, neutropenia), damage to body organs (brain, heart, liver, kidney, lungs, nervous system, skin, and others), allergic reactions, sterility, nausea/vomiting, constipation/diarrhea, sores in the mouth, secondary cancers, local damage at possible injection sites, and rarely death were all discussed. Specific side effects pertaining to their chemotherapy/immunotherapy medications were discussed as well.The patient agrees with the plan, and all questions and their support system's questions have been answered to their satisfaction. Contraindications and potential side effects discussed as listed in micromedx.     4. Patient was given binder which includes: [facility], an immunotherapy side effect guide (if applicable to patient), resources including, but not limited to: women's wellness, acupuncture, physical therapy, , urgent care within oncology and financial assistance.     5. Patient was educated on when to call (and given the numbers to call and  knows to message via [facility] if possible) or notify the provider including, but not limited to:   Persistent Nausea and/or Vomiting  Dehydration  Persistent Diarrhea  Fever of 100.4 > 1 hour in duration or any isolated fever > 101   Rash (while on active chemotherapy or immunotherapy)   Severe pain or new onset pain not controlled by current medication regimen  Or any other symptom you feel is related to your current hematology or oncology treatment    6. Patient was provided with additional resources that [facility] offers including, but not limited to: financial counseling, , psychologist, palliative care, support groups, transportation, dietician, rehab services, women's wellness and urgent care visits within the oncology department.     7. Patient was offered and signed up for chemo care companion. Educated on daily vital signs and daily questionnaire.     8. Patient has an established PCP, patient currently without a PCP, but stable, will refer to internal medicine, or patient without a PCP and referred to oncology PCP clinic.      9. Patient was offered a virtual visit or a phone call 1 week post their Cycle 1 Day 1 chemotherapy and agreed or declined.       Patient will Proceed with cycle 1 day 1. Patient will be seen ~1 week for a post chemo visit with DARIANA.     Return to clinic in 2 weeks with MD appointment and labs.     Patient is in agreement with the proposed treatment plan. All questions were answered to the patient's satisfaction. Patient knows to call clinic for any new or worsening symptoms and if anything is needed before the next clinic visit.          Moraima Alvarado, FNP-C  Hematology & Medical Oncology   [address] [place] [address]  ph. [phone]  Fax. [phone]    Collaborating physician, Dr. Johnson.    Approximately 45 minutes were spent face-to-face with the patient.  Approximately 30 minutes in total were spent on this encounter, which includes  face-to-face time and non-face-to-face time preparing to see the patient (e.g., review of tests), obtaining and/or reviewing separately obtained history, documenting clinical information in the electronic or other health record, independently interpreting results (not separately reported) and communicating results to the patient/family/caregiver, or care coordination (not separately reported).          Route Chart for Scheduling    Med Onc Chart Routing      Follow up with physician 2 weeks. See Dr. Johnson Cycles 1,3,5,11   Follow up with DARIANA . See me cycle 2, 4, 8, 14 of chemo   Infusion scheduling note   AC x 4 cycles (2 weeks apart); then taxol weekly x 12 weeks   Injection scheduling note [facility] at [place] after 4 cycles of AC   Labs   Scheduling:  Preferred lab: [facility]  Lab interval:  labs prior to chemo every cycle CBC and CMP; request day before chemo at [facility]   Imaging    Pharmacy appointment    Other referrals              Treatment Plan Information   OP BREAST DOSE-DENSE AC-T (DOXORUBICIN CYCLOPHOSPHAMIDE Q2W FOLLOWED BY PACLITAXEL WEEKLY)   Sonia Johnson MD   Upcoming Treatment Dates - OP BREAST DOSE-DENSE AC-T (DOXORUBICIN CYCLOPHOSPHAMIDE Q2W FOLLOWED BY PACLITAXEL WEEKLY)    8/17/2023       Chemotherapy       DOXOrubicin chemo injection 112 mg       cycloPHOSphamide 600 mg/m2 = 1,120 mg in sodium chloride 0.9% 255.6 mL chemo infusion       Antiemetics       aprepitant (CINVANTI) injection 130 mg       palonosetron 0.25mg/dexAMETHasone 12mg in NS IVPB 0.25 mg 50 mL  8/18/2023       Growth Factor       pegfilgrastim-cbqv (UDENYCA) injection 6 mg  8/31/2023       Chemotherapy       DOXOrubicin chemo injection 112 mg       cycloPHOSphamide 600 mg/m2 = 1,120 mg in sodium chloride 0.9% 255.6 mL chemo infusion       Antiemetics       aprepitant (CINVANTI) injection 130 mg       palonosetron 0.25mg/dexAMETHasone 12mg in NS IVPB 0.25 mg 50 mL  9/1/2023       Growth Factor        pegfilgrastim-cbqv (UDENYCA) injection 6 mg

## 2023-08-09 ENCOUNTER — TELEPHONE (OUTPATIENT)
Dept: SURGERY | Facility: CLINIC | Age: 53
End: 2023-08-09
Payer: COMMERCIAL

## 2023-08-09 NOTE — TELEPHONE ENCOUNTER
Reminder call to patient regarding genetic testing appointment for tomorrow Thursday 8/10/2023. Patient to be seen at 1 pm in [facility]. Patient voiced understanding and had no questions at this time.

## 2023-08-10 ENCOUNTER — CLINICAL SUPPORT (OUTPATIENT)
Dept: SURGERY | Facility: CLINIC | Age: 53
End: 2023-08-10
Payer: COMMERCIAL

## 2023-08-10 ENCOUNTER — DOCUMENTATION ONLY (OUTPATIENT)
Dept: SURGERY | Facility: CLINIC | Age: 53
End: 2023-08-10

## 2023-08-10 ENCOUNTER — HOSPITAL ENCOUNTER (OUTPATIENT)
Dept: CARDIOLOGY | Facility: HOSPITAL | Age: 53
Discharge: HOME | End: 2023-08-10
Attending: STUDENT IN AN ORGANIZED HEALTH CARE EDUCATION/TRAINING PROGRAM
Payer: COMMERCIAL

## 2023-08-10 ENCOUNTER — OFFICE VISIT (OUTPATIENT)
Dept: HEMATOLOGY/ONCOLOGY | Facility: CLINIC | Age: 53
End: 2023-08-10
Payer: COMMERCIAL

## 2023-08-10 VITALS
SYSTOLIC BLOOD PRESSURE: 129 MMHG | RESPIRATION RATE: 18 BRPM | HEIGHT: 66 IN | OXYGEN SATURATION: 98 % | WEIGHT: 176.81 LBS | BODY MASS INDEX: 28.42 KG/M2 | HEART RATE: 58 BPM | TEMPERATURE: 99 F | DIASTOLIC BLOOD PRESSURE: 82 MMHG

## 2023-08-10 VITALS
HEIGHT: 66 IN | BODY MASS INDEX: 26.84 KG/M2 | HEART RATE: 70 BPM | DIASTOLIC BLOOD PRESSURE: 82 MMHG | WEIGHT: 167 LBS | SYSTOLIC BLOOD PRESSURE: 129 MMHG

## 2023-08-10 DIAGNOSIS — C50.919 BREAST CANCER: Primary | ICD-10-CM

## 2023-08-10 DIAGNOSIS — C50.919 BREAST CANCER: ICD-10-CM

## 2023-08-10 DIAGNOSIS — C50.212 MALIGNANT NEOPLASM OF UPPER-INNER QUADRANT OF LEFT BREAST IN FEMALE, ESTROGEN RECEPTOR NEGATIVE: Primary | ICD-10-CM

## 2023-08-10 DIAGNOSIS — Z17.1 MALIGNANT NEOPLASM OF UPPER-INNER QUADRANT OF LEFT BREAST IN FEMALE, ESTROGEN RECEPTOR NEGATIVE: Primary | ICD-10-CM

## 2023-08-10 DIAGNOSIS — N63.0 BREAST MASS IN FEMALE: ICD-10-CM

## 2023-08-10 DIAGNOSIS — C50.912 INFILTRATING DUCTAL CARCINOMA OF LEFT BREAST: Primary | ICD-10-CM

## 2023-08-10 LAB
ASCENDING AORTA: 3.57 CM
AV INDEX (PROSTH): 0.95
AV MEAN GRADIENT: 3 MMHG
AV PEAK GRADIENT: 5 MMHG
AV VALVE AREA BY VELOCITY RATIO: 3.47 CM²
AV VALVE AREA: 3.71 CM²
AV VELOCITY RATIO: 0.89
BSA FOR ECHO PROCEDURE: 1.88 M2
CV ECHO LV RWT: 0.32 CM
DOP CALC AO PEAK VEL: 1.08 M/S
DOP CALC AO VTI: 25.99 CM
DOP CALC LVOT AREA: 3.9 CM2
DOP CALC LVOT DIAMETER: 2.23 CM
DOP CALC LVOT PEAK VEL: 0.96 M/S
DOP CALC LVOT STROKE VOLUME: 96.46 CM3
DOP CALCLVOT PEAK VEL VTI: 24.71 CM
E WAVE DECELERATION TIME: 168.72 MSEC
E/A RATIO: 1.74
E/E' RATIO: 5.84 M/S
ECHO LV POSTERIOR WALL: 0.78 CM (ref 0.6–1.1)
FRACTIONAL SHORTENING: 34 % (ref 28–44)
GLOBAL LONGITUIDAL STRAIN: -20 %
INTERVENTRICULAR SEPTUM: 0.76 CM (ref 0.6–1.1)
IVRT: 87.54 MSEC
LA MAJOR: 5 CM
LA MINOR: 5.14 CM
LA WIDTH: 4.12 CM
LEFT ATRIUM SIZE: 3.37 CM
LEFT ATRIUM VOLUME INDEX MOD: 28.9 ML/M2
LEFT ATRIUM VOLUME INDEX: 32.3 ML/M2
LEFT ATRIUM VOLUME MOD: 53.44 CM3
LEFT ATRIUM VOLUME: 59.82 CM3
LEFT INTERNAL DIMENSION IN SYSTOLE: 3.26 CM (ref 2.1–4)
LEFT VENTRICLE DIASTOLIC VOLUME INDEX: 62.5 ML/M2
LEFT VENTRICLE DIASTOLIC VOLUME: 115.62 ML
LEFT VENTRICLE MASS INDEX: 69 G/M2
LEFT VENTRICLE SYSTOLIC VOLUME INDEX: 23.2 ML/M2
LEFT VENTRICLE SYSTOLIC VOLUME: 42.86 ML
LEFT VENTRICULAR INTERNAL DIMENSION IN DIASTOLE: 4.95 CM (ref 3.5–6)
LEFT VENTRICULAR MASS: 127.12 G
LV LATERAL E/E' RATIO: 4.56 M/S
LV SEPTAL E/E' RATIO: 8.11 M/S
MV A" WAVE DURATION": 16.75 MSEC
MV PEAK A VEL: 0.42 M/S
MV PEAK E VEL: 0.73 M/S
MV STENOSIS PRESSURE HALF TIME: 48.93 MS
MV VALVE AREA P 1/2 METHOD: 4.5 CM2
OHS LV EJECTION FRACTION SIMPSONS BIPLANE MOD: 56 %
PISA TR MAX VEL: 2.16 M/S
PULM VEIN S/D RATIO: 1.31
PV PEAK D VEL: 0.29 M/S
PV PEAK S VEL: 0.38 M/S
RA MAJOR: 4.46 CM
RA PRESSURE ESTIMATED: 3 MMHG
RA WIDTH: 3.55 CM
RIGHT VENTRICULAR END-DIASTOLIC DIMENSION: 3.19 CM
RV TB RVSP: 5 MMHG
SINUS: 3.91 CM
STJ: 3.38 CM
TDI LATERAL: 0.16 M/S
TDI SEPTAL: 0.09 M/S
TDI: 0.13 M/S
TR MAX PG: 19 MMHG
TRICUSPID ANNULAR PLANE SYSTOLIC EXCURSION: 2.51 CM
TV REST PULMONARY ARTERY PRESSURE: 22 MMHG
Z-SCORE OF LEFT VENTRICULAR DIMENSION IN END DIASTOLE: -0.38
Z-SCORE OF LEFT VENTRICULAR DIMENSION IN END SYSTOLE: 0.22

## 2023-08-10 PROCEDURE — 93306 TTE W/DOPPLER COMPLETE: CPT

## 2023-08-10 PROCEDURE — 99999 PR PBB SHADOW E&M-EST. PATIENT-LVL IV: CPT | Mod: PBBFAC,,, | Performed by: NURSE PRACTITIONER

## 2023-08-10 PROCEDURE — 93306 TTE W/DOPPLER COMPLETE: CPT | Mod: 26,,, | Performed by: INTERNAL MEDICINE

## 2023-08-10 PROCEDURE — 3074F SYST BP LT 130 MM HG: CPT | Mod: CPTII,S$GLB,, | Performed by: NURSE PRACTITIONER

## 2023-08-10 PROCEDURE — 3044F HG A1C LEVEL LT 7.0%: CPT | Mod: CPTII,S$GLB,, | Performed by: NURSE PRACTITIONER

## 2023-08-10 PROCEDURE — 3008F BODY MASS INDEX DOCD: CPT | Mod: CPTII,S$GLB,, | Performed by: NURSE PRACTITIONER

## 2023-08-10 PROCEDURE — 3074F PR MOST RECENT SYSTOLIC BLOOD PRESSURE < 130 MM HG: ICD-10-PCS | Mod: CPTII,S$GLB,, | Performed by: NURSE PRACTITIONER

## 2023-08-10 PROCEDURE — 93306 ECHO (CUPID ONLY): ICD-10-PCS | Mod: 26,,, | Performed by: INTERNAL MEDICINE

## 2023-08-10 PROCEDURE — 1160F PR REVIEW ALL MEDS BY PRESCRIBER/CLIN PHARMACIST DOCUMENTED: ICD-10-PCS | Mod: CPTII,S$GLB,, | Performed by: NURSE PRACTITIONER

## 2023-08-10 PROCEDURE — 3044F PR MOST RECENT HEMOGLOBIN A1C LEVEL <7.0%: ICD-10-PCS | Mod: CPTII,S$GLB,, | Performed by: NURSE PRACTITIONER

## 2023-08-10 PROCEDURE — 1159F MED LIST DOCD IN RCRD: CPT | Mod: CPTII,S$GLB,, | Performed by: NURSE PRACTITIONER

## 2023-08-10 PROCEDURE — 1159F PR MEDICATION LIST DOCUMENTED IN MEDICAL RECORD: ICD-10-PCS | Mod: CPTII,S$GLB,, | Performed by: NURSE PRACTITIONER

## 2023-08-10 PROCEDURE — 3079F PR MOST RECENT DIASTOLIC BLOOD PRESSURE 80-89 MM HG: ICD-10-PCS | Mod: CPTII,S$GLB,, | Performed by: NURSE PRACTITIONER

## 2023-08-10 PROCEDURE — 99999 PR PBB SHADOW E&M-EST. PATIENT-LVL IV: ICD-10-PCS | Mod: PBBFAC,,, | Performed by: NURSE PRACTITIONER

## 2023-08-10 PROCEDURE — 1160F RVW MEDS BY RX/DR IN RCRD: CPT | Mod: CPTII,S$GLB,, | Performed by: NURSE PRACTITIONER

## 2023-08-10 PROCEDURE — 99215 PR OFFICE/OUTPT VISIT, EST, LEVL V, 40-54 MIN: ICD-10-PCS | Mod: S$GLB,,, | Performed by: NURSE PRACTITIONER

## 2023-08-10 PROCEDURE — 3008F PR BODY MASS INDEX (BMI) DOCUMENTED: ICD-10-PCS | Mod: CPTII,S$GLB,, | Performed by: NURSE PRACTITIONER

## 2023-08-10 PROCEDURE — 3079F DIAST BP 80-89 MM HG: CPT | Mod: CPTII,S$GLB,, | Performed by: NURSE PRACTITIONER

## 2023-08-10 PROCEDURE — 99999 PR PBB SHADOW E&M-EST. PATIENT-LVL I: CPT | Mod: PBBFAC,,,

## 2023-08-10 PROCEDURE — 99215 OFFICE O/P EST HI 40 MIN: CPT | Mod: S$GLB,,, | Performed by: NURSE PRACTITIONER

## 2023-08-10 PROCEDURE — 99999 PR PBB SHADOW E&M-EST. PATIENT-LVL I: ICD-10-PCS | Mod: PBBFAC,,,

## 2023-08-10 NOTE — PROGRESS NOTES
Genetics Lay Navigator Note:    Nurse Navigator : MARLENE Peres RN    MULTI-D PT. :   Surg Onc. : Hua  Med Onc. : Elizabeth    Met with patient of Dr. Sequeira and Dr. Johnson on today (8/10/2023) to facilitate genetic testing. Set patient up with [employer] genetic counselor over the phone to complete counseling prior to testing. Patient verbalized understanding to all counseling information. [employer] brochure with number to call with questions or concerns provided to patient as well as my card. Encouraged patient to call me or [employer] at any time.     Lab appointment made and patient escorted with [employer] kit to lab for specimen draw and processing. Patient instructed that results will be provided as soon as they are available. No questions or concerns from patient about plan of care.     [employer] Genetic Pedigree scanned in media and attached to this documentation note.    [employer] Tracking # [id number]

## 2023-08-11 DIAGNOSIS — C50.912 INFILTRATING DUCTAL CARCINOMA OF LEFT BREAST: Primary | ICD-10-CM

## 2023-08-12 ENCOUNTER — PATIENT MESSAGE (OUTPATIENT)
Dept: ADMINISTRATIVE | Facility: OTHER | Age: 53
End: 2023-08-12
Payer: COMMERCIAL

## 2023-08-14 ENCOUNTER — PATIENT MESSAGE (OUTPATIENT)
Dept: INTERVENTIONAL RADIOLOGY/VASCULAR | Facility: HOSPITAL | Age: 53
End: 2023-08-14
Payer: COMMERCIAL

## 2023-08-14 ENCOUNTER — PATIENT MESSAGE (OUTPATIENT)
Dept: RESEARCH | Facility: HOSPITAL | Age: 53
End: 2023-08-14
Payer: COMMERCIAL

## 2023-08-15 ENCOUNTER — HOSPITAL ENCOUNTER (OUTPATIENT)
Dept: INTERVENTIONAL RADIOLOGY/VASCULAR | Facility: HOSPITAL | Age: 53
Discharge: HOME | End: 2023-08-15
Attending: FAMILY MEDICINE | Admitting: STUDENT IN AN ORGANIZED HEALTH CARE EDUCATION/TRAINING PROGRAM
Payer: COMMERCIAL

## 2023-08-15 VITALS
HEART RATE: 55 BPM | RESPIRATION RATE: 11 BRPM | TEMPERATURE: 98 F | OXYGEN SATURATION: 100 % | SYSTOLIC BLOOD PRESSURE: 128 MMHG | DIASTOLIC BLOOD PRESSURE: 80 MMHG

## 2023-08-15 DIAGNOSIS — C50.919 BREAST CANCER: ICD-10-CM

## 2023-08-15 PROCEDURE — 63600175 PHARM REV CODE 636 W HCPCS: Performed by: STUDENT IN AN ORGANIZED HEALTH CARE EDUCATION/TRAINING PROGRAM

## 2023-08-15 PROCEDURE — 76937 US GUIDE VASCULAR ACCESS: CPT | Mod: 26,,, | Performed by: STUDENT IN AN ORGANIZED HEALTH CARE EDUCATION/TRAINING PROGRAM

## 2023-08-15 PROCEDURE — 99152 PR MOD CONSCIOUS SEDATION, SAME PHYS, 5+ YRS, FIRST 15 MIN: ICD-10-PCS | Mod: ,,, | Performed by: STUDENT IN AN ORGANIZED HEALTH CARE EDUCATION/TRAINING PROGRAM

## 2023-08-15 PROCEDURE — 99152 MOD SED SAME PHYS/QHP 5/>YRS: CPT | Mod: ,,, | Performed by: STUDENT IN AN ORGANIZED HEALTH CARE EDUCATION/TRAINING PROGRAM

## 2023-08-15 PROCEDURE — 77001 FLUOROGUIDE FOR VEIN DEVICE: CPT | Mod: TC | Performed by: STUDENT IN AN ORGANIZED HEALTH CARE EDUCATION/TRAINING PROGRAM

## 2023-08-15 PROCEDURE — 77001 FLUOROGUIDE FOR VEIN DEVICE: CPT | Mod: 26,,, | Performed by: STUDENT IN AN ORGANIZED HEALTH CARE EDUCATION/TRAINING PROGRAM

## 2023-08-15 PROCEDURE — 36561 INSERT TUNNELED CV CATH: CPT | Mod: ,,, | Performed by: STUDENT IN AN ORGANIZED HEALTH CARE EDUCATION/TRAINING PROGRAM

## 2023-08-15 PROCEDURE — 36561 IR TUNNELED CATH PLACEMENT WITH PORT: ICD-10-PCS | Mod: ,,, | Performed by: STUDENT IN AN ORGANIZED HEALTH CARE EDUCATION/TRAINING PROGRAM

## 2023-08-15 PROCEDURE — 99152 MOD SED SAME PHYS/QHP 5/>YRS: CPT | Performed by: STUDENT IN AN ORGANIZED HEALTH CARE EDUCATION/TRAINING PROGRAM

## 2023-08-15 PROCEDURE — 76937 PR  US GUIDE, VASCULAR ACCESS: ICD-10-PCS | Mod: 26,,, | Performed by: STUDENT IN AN ORGANIZED HEALTH CARE EDUCATION/TRAINING PROGRAM

## 2023-08-15 PROCEDURE — C1769 GUIDE WIRE: HCPCS

## 2023-08-15 PROCEDURE — 25000003 PHARM REV CODE 250: Performed by: STUDENT IN AN ORGANIZED HEALTH CARE EDUCATION/TRAINING PROGRAM

## 2023-08-15 PROCEDURE — 94761 N-INVAS EAR/PLS OXIMETRY MLT: CPT

## 2023-08-15 PROCEDURE — 76937 US GUIDE VASCULAR ACCESS: CPT | Mod: TC | Performed by: STUDENT IN AN ORGANIZED HEALTH CARE EDUCATION/TRAINING PROGRAM

## 2023-08-15 PROCEDURE — 99900035 HC TECH TIME PER 15 MIN (STAT)

## 2023-08-15 PROCEDURE — 36561 INSERT TUNNELED CV CATH: CPT | Performed by: STUDENT IN AN ORGANIZED HEALTH CARE EDUCATION/TRAINING PROGRAM

## 2023-08-15 PROCEDURE — C1788 PORT, INDWELLING, IMP: HCPCS

## 2023-08-15 PROCEDURE — 77001 CHG FLUOROGUIDE CNTRL VEN ACCESS,PLACE,REPLACE,REMOVE: ICD-10-PCS | Mod: 26,,, | Performed by: STUDENT IN AN ORGANIZED HEALTH CARE EDUCATION/TRAINING PROGRAM

## 2023-08-15 PROCEDURE — C1894 INTRO/SHEATH, NON-LASER: HCPCS

## 2023-08-15 RX ORDER — SODIUM CHLORIDE 9 MG/ML
INJECTION, SOLUTION INTRAVENOUS CONTINUOUS
Status: DISCONTINUED | OUTPATIENT
Start: 2023-08-15 | End: 2023-08-16 | Stop reason: HOSPADM

## 2023-08-15 RX ORDER — LIDOCAINE HYDROCHLORIDE 10 MG/ML
1 INJECTION, SOLUTION EPIDURAL; INFILTRATION; INTRACAUDAL; PERINEURAL ONCE
Status: DISCONTINUED | OUTPATIENT
Start: 2023-08-15 | End: 2023-08-16 | Stop reason: HOSPADM

## 2023-08-15 RX ORDER — FENTANYL CITRATE 50 UG/ML
INJECTION, SOLUTION INTRAMUSCULAR; INTRAVENOUS
Status: COMPLETED | OUTPATIENT
Start: 2023-08-15 | End: 2023-08-15

## 2023-08-15 RX ORDER — HEPARIN SODIUM 1000 [USP'U]/ML
INJECTION, SOLUTION INTRAVENOUS; SUBCUTANEOUS
Status: COMPLETED | OUTPATIENT
Start: 2023-08-15 | End: 2023-08-15

## 2023-08-15 RX ORDER — HEPARIN SOD,PORCINE/0.9 % NACL 1000/500ML
INTRAVENOUS SOLUTION INTRAVENOUS
Status: COMPLETED | OUTPATIENT
Start: 2023-08-15 | End: 2023-08-15

## 2023-08-15 RX ORDER — CEFAZOLIN SODIUM 1 G/3ML
INJECTION, POWDER, FOR SOLUTION INTRAMUSCULAR; INTRAVENOUS
Status: COMPLETED | OUTPATIENT
Start: 2023-08-15 | End: 2023-08-15

## 2023-08-15 RX ORDER — LIDOCAINE HYDROCHLORIDE 10 MG/ML
INJECTION INFILTRATION; PERINEURAL
Status: COMPLETED | OUTPATIENT
Start: 2023-08-15 | End: 2023-08-15

## 2023-08-15 RX ORDER — ONDANSETRON 2 MG/ML
4 INJECTION INTRAMUSCULAR; INTRAVENOUS EVERY 6 HOURS PRN
Status: DISCONTINUED | OUTPATIENT
Start: 2023-08-15 | End: 2023-08-16 | Stop reason: HOSPADM

## 2023-08-15 RX ORDER — MIDAZOLAM HYDROCHLORIDE 1 MG/ML
INJECTION INTRAMUSCULAR; INTRAVENOUS
Status: COMPLETED | OUTPATIENT
Start: 2023-08-15 | End: 2023-08-15

## 2023-08-15 RX ADMIN — CEFAZOLIN 1 G: 330 INJECTION, POWDER, FOR SOLUTION INTRAMUSCULAR; INTRAVENOUS at 10:08

## 2023-08-15 RX ADMIN — MIDAZOLAM HYDROCHLORIDE 1 MG: 1 INJECTION INTRAMUSCULAR; INTRAVENOUS at 10:08

## 2023-08-15 RX ADMIN — Medication 500 ML: at 10:08

## 2023-08-15 RX ADMIN — LIDOCAINE HYDROCHLORIDE 10 ML: 10 INJECTION, SOLUTION INFILTRATION; PERINEURAL at 10:08

## 2023-08-15 RX ADMIN — FENTANYL CITRATE 50 MCG: 0.05 INJECTION, SOLUTION INTRAMUSCULAR; INTRAVENOUS at 10:08

## 2023-08-15 RX ADMIN — HEPARIN SODIUM 500 UNITS: 1000 INJECTION, SOLUTION INTRAVENOUS; SUBCUTANEOUS at 11:08

## 2023-08-15 NOTE — DISCHARGE SUMMARY
"Radiology Discharge Summary      Hospital Course: No complications    Admit Date: 8/15/2023  Discharge Date: 8/15/2023     Instructions Given to Patient: Yes  Diet: Resume prior diet  Activity: activity as tolerated and no driving for today    Description of Condition on Discharge: Stable  Vital Signs (Most Recent): Temp: 98 °F (36.7 °C) (08/15/23 0900)  Pulse: (!) 55 (08/15/23 1108)  Resp: 14 (08/15/23 1108)  BP: 127/80 (08/15/23 1108)  SpO2: 95 % (08/15/23 1108)    Discharge Disposition: Home    Discharge Diagnosis: breast cancer     Follow-up: as scheduled    Khurram Pereira MD ([facility])  Interventional Radiology  [phone]      "

## 2023-08-15 NOTE — PROCEDURES
"  Pre Op Diagnosis: Breast cancer  Post Op Diagnosis: Same    Procedure: Port placement    Procedure performed by: Kelli    Written Informed Consent Obtained: Yes  Specimen Removed: NO  Estimated Blood Loss: Minimal    Findings:   Successful placement of 8 Fr right chest port. Ready for immediate use.     Patient tolerated procedure well.    Khurram Pereira MD ([facility])  Interventional Radiology  [phone]      "

## 2023-08-15 NOTE — Clinical Note
Right: Chest.   Scrubbed with ChloroPrep With Tint.   Painted with None.  Hair: N/A.  Skin prep dry before draping.  Prepped by: Segundo John 8/15/2023 10:44 AM.

## 2023-08-15 NOTE — H&P
Radiology History & Physical      SUBJECTIVE:     Chief Complaint: Left breast cancer    History of Present Illness:  Teresa Valdes is a 53 y.o. female who presents for Port placement.    Past Medical History:   Diagnosis Date    Anxiety     Depression     Hypertension     resolved    Ovarian cancer     Sebaceous cyst 4/2/2018     Past Surgical History:   Procedure Laterality Date    ADENOIDECTOMY      BREAST CYST EXCISION Right     BREAST SURGERY      Abscess     CHOLECYSTECTOMY      COLONOSCOPY N/A 4/11/2023    Procedure: COLONOSCOPY;  Surgeon: Mona Valverde MD;  Location: [facility];  Service: Endoscopy;  Laterality: N/A;    CYST REMOVAL      on back    cyst removed left wrist      KNEE LIGAMENT RECONSTRUCTION Left     sleeve gastroectomy  06/2017    in [place]    TONSILLECTOMY      Uvuloplasty         Home Meds:   Prior to Admission medications    Medication Sig Start Date End Date Taking? Authorizing Provider   venlafaxine (EFFEXOR) 37.5 MG Tab Take 1 tablet (37.5 mg total) by mouth once daily. 7/24/23  Yes Maryse Abreu MD   dexAMETHasone (DECADRON) 4 MG Tab Take 8mg (2 pills) po daily on days 2-4 of chemotherapy. Only for cycles 1-4 8/4/23   Sonia Johnson MD   LIDOcaine-prilocaine (EMLA) cream Apply topically as needed. 8/4/23   Sonia Johnson MD   multivitamin capsule Take 1 capsule by mouth once daily.    Provider, Historical   OLANZapine (ZYPREXA) 5 MG tablet Take 5mg nightly on days 1-4 of chemotherapy 8/4/23   Sonia Johnson MD   ondansetron (ZOFRAN) 8 MG tablet Take 1 tablet (8 mg total) by mouth every 12 (twelve) hours as needed for Nausea. 8/4/23 8/3/24  Sonia Johnson MD     Anticoagulants/Antiplatelets: no anticoagulation    Allergies: Review of patient's allergies indicates:  No Known Allergies  Sedation History:  no adverse reactions    Review of Systems:   Hematological: no known coagulopathies  Respiratory: no shortness of breath  Cardiovascular: no chest  "pain  Gastrointestinal: no abdominal pain  Genito-Urinary: no dysuria  Musculoskeletal: negative  Neurological: no TIA or stroke symptoms         OBJECTIVE:     Vital Signs (Most Recent)  Temp: 98 °F (36.7 °C) (08/15/23 0900)  Pulse: (!) 55 (08/15/23 0900)  Resp: 17 (08/15/23 0900)  BP: (!) 154/78 (08/15/23 0900)  SpO2: 100 % (08/15/23 0900)    Physical Exam:  ASA: 2  Mallampati: 2    General: no acute distress  Mental Status: alert and oriented to person, place and time  HEENT: normocephalic, atraumatic  Chest: unlabored breathing  Heart: regular heart rate  Abdomen: nondistended  Extremity: moves all extremities    Laboratory  No results found for: "INR", "PT", "PTT"    Lab Results   Component Value Date    WBC 5.36 04/13/2023    HGB 14.0 04/13/2023    HCT 40.4 04/13/2023     (H) 04/13/2023     04/13/2023      Lab Results   Component Value Date    GLU 78 04/13/2023     04/13/2023    K 4.5 04/13/2023     04/13/2023    CO2 31 (H) 04/13/2023    BUN 14 04/13/2023    CREATININE 0.66 04/13/2023    CALCIUM 9.3 04/13/2023    MG 2.1 02/07/2018    ALT 16 04/13/2023    AST 26 04/13/2023    ALBUMIN 4.2 04/13/2023    BILITOT 0.5 04/13/2023       ASSESSMENT/PLAN:     Sedation Plan: moderate  Patient will undergo right chest port placement.    Khurram Pereira MD ([facility])  Interventional Radiology          "

## 2023-08-15 NOTE — DISCHARGE INSTRUCTIONS
Port Placement    *PLEASE READ CAREFULLY!!*    Your port insertion/removal site is dressed with gauze and Tega-Derm (looks like Saran Wrap). Underneath the dressing is Derma-Bond (skin glue) and Steri-Strips (looks like strips of white tape). There are also sutures, or stitches, on the inside - these will dissolve on their own.    Do NOT remove the dressing today. Do NOT shower/bathe tonight.     You can shower/bathe tomorrow night. Leave the dressing ON during your shower/bath. Turn the site away from the shower's spray; if you bathe, do NOT allow the site underwater. After your shower/bath, you may remove the dressing. Gently pat the site dry.    The site may remained uncovered. Allow the Steri-Strips and Derma-Bond to come off on their own. You can use a large pad-style bandage, or a 4x4 gauze dressing and tape to cover the site if your clothing (or bra straps) rubbing against it is uncomfortable to you. These items can be found at your local pharmacy or grocery/shopping center.    Until the site heals, usually within one to two weeks, DO NOT submerge in a bath tub, swimming pool, or jacuzzi. DO NOT lift/carry anything heavier than a gallon of milk. DO NOT reach excessively or repeatedly above your head.    Hand hygiene is VERY important! Anyone, including you, who touches the port site should do so with CLEAN hands.     For INSERTIONS: Only trained personnel should access your port. At the end of whatever activity involving your port is completed (chemo, other infusion, blood draw, etc), it should be flushed with a medicine called Heparin.

## 2023-08-16 ENCOUNTER — TELEPHONE (OUTPATIENT)
Dept: SURGERY | Facility: CLINIC | Age: 53
End: 2023-08-16
Payer: COMMERCIAL

## 2023-08-16 ENCOUNTER — PATIENT MESSAGE (OUTPATIENT)
Dept: SURGERY | Facility: CLINIC | Age: 53
End: 2023-08-16
Payer: COMMERCIAL

## 2023-08-16 NOTE — TELEPHONE ENCOUNTER
Contacted patient regarding STAT panel genetic results. Patient did not answer, could not leave message as mailbox full. Will contact patient back at later time.

## 2023-08-21 ENCOUNTER — TELEPHONE (OUTPATIENT)
Dept: RESEARCH | Facility: HOSPITAL | Age: 53
End: 2023-08-21
Payer: COMMERCIAL

## 2023-08-21 ENCOUNTER — PATIENT MESSAGE (OUTPATIENT)
Dept: SURGERY | Facility: CLINIC | Age: 53
End: 2023-08-21
Payer: COMMERCIAL

## 2023-08-21 ENCOUNTER — PATIENT MESSAGE (OUTPATIENT)
Dept: HEMATOLOGY/ONCOLOGY | Facility: CLINIC | Age: 53
End: 2023-08-21
Payer: COMMERCIAL

## 2023-08-21 ENCOUNTER — TELEPHONE (OUTPATIENT)
Dept: SURGERY | Facility: CLINIC | Age: 53
End: 2023-08-21
Payer: COMMERCIAL

## 2023-08-21 DIAGNOSIS — R07.9 CHEST PAIN, UNSPECIFIED TYPE: Primary | ICD-10-CM

## 2023-08-21 NOTE — TELEPHONE ENCOUNTER
Attempted to contact patient regarding genetic testing results. Patient did not answer, could not leave message will try patient again.

## 2023-08-21 NOTE — TELEPHONE ENCOUNTER
I called Ms. Valdes today to follow up on a message we sent her regarding possible participation in research. No answer and mailbox full. Unable to leave a message.

## 2023-08-22 ENCOUNTER — PATIENT OUTREACH (OUTPATIENT)
Dept: ADMINISTRATIVE | Facility: HOSPITAL | Age: 53
End: 2023-08-22
Payer: COMMERCIAL

## 2023-08-22 ENCOUNTER — TELEPHONE (OUTPATIENT)
Dept: SURGERY | Facility: CLINIC | Age: 53
End: 2023-08-22
Payer: COMMERCIAL

## 2023-08-22 ENCOUNTER — PATIENT MESSAGE (OUTPATIENT)
Dept: ADMINISTRATIVE | Facility: HOSPITAL | Age: 53
End: 2023-08-22
Payer: COMMERCIAL

## 2023-08-22 NOTE — TELEPHONE ENCOUNTER
Genetic Lay Navigation Note:    Called patient with the results of her genetic testing. Informed patient that results were negative for any notable mutation. Instructed patient that we would ensure she received a copy of her results via [employer] or mail, and to call us with any questions or concerns regarding the full report. Patient verbalized understanding to all information, no questions at this time.     Patient's ordering physician made aware of results and that patient was informed of them.

## 2023-08-22 NOTE — PROGRESS NOTES
Care Everywhere updates requested and reviewed.  Immunizations reconciled. Media reports reviewed.  Duplicate HM overrides and  orders removed.  Overdue HM topic chart audit and/or requested.  Overdue lab testing linked to upcoming lab appointments if applies.        Health Maintenance Due   Topic Date Due    Shingles Vaccine (1 of 2) Never done    COVID-19 Vaccine (3 - Pfizer series) 2021    Cervical Cancer Screening  2023

## 2023-08-24 ENCOUNTER — HOSPITAL ENCOUNTER (OUTPATIENT)
Dept: CARDIOLOGY | Facility: CLINIC | Age: 53
Discharge: HOME | End: 2023-08-24
Payer: COMMERCIAL

## 2023-08-24 DIAGNOSIS — R07.9 CHEST PAIN, UNSPECIFIED TYPE: ICD-10-CM

## 2023-08-24 PROCEDURE — 93005 EKG 12-LEAD: ICD-10-PCS | Mod: S$GLB,,, | Performed by: NURSE PRACTITIONER

## 2023-08-24 PROCEDURE — 93005 ELECTROCARDIOGRAM TRACING: CPT | Mod: S$GLB,,, | Performed by: NURSE PRACTITIONER

## 2023-08-24 PROCEDURE — 93010 EKG 12-LEAD: ICD-10-PCS | Mod: S$GLB,,, | Performed by: INTERNAL MEDICINE

## 2023-08-24 PROCEDURE — 93010 ELECTROCARDIOGRAM REPORT: CPT | Mod: S$GLB,,, | Performed by: INTERNAL MEDICINE

## 2023-08-29 ENCOUNTER — PATIENT MESSAGE (OUTPATIENT)
Dept: HEMATOLOGY/ONCOLOGY | Facility: CLINIC | Age: 53
End: 2023-08-29
Payer: COMMERCIAL

## 2023-08-29 ENCOUNTER — PATIENT MESSAGE (OUTPATIENT)
Dept: SURGERY | Facility: CLINIC | Age: 53
End: 2023-08-29
Payer: COMMERCIAL

## 2023-08-30 ENCOUNTER — PATIENT MESSAGE (OUTPATIENT)
Dept: FAMILY MEDICINE | Facility: CLINIC | Age: 53
End: 2023-08-30
Payer: COMMERCIAL

## 2023-08-30 NOTE — PROGRESS NOTES
[facility]/ [facility]      Chief Complaint:   TNBC       Cancer Staging   Invasive ductal carcinoma of breast  Staging form: Breast, AJCC 8th Edition  - Clinical stage from 2023: Stage IB (cT1c, cN0, cM0, G3, ER-, AR-, HER2-) - Signed by Sonia Johnson MD on 2023      HPI:  Teresa Valdes is a 53 y.o. female who presents today for evaluation of newly diagnosed breast cancer. Her oncologic history is as follows:    -screening MMG 23 showing an asymmetry in the central L breast. Diagnostic MMG/US revealing a 19 x 12 x17mm complex cystic and solid mass seen in the L breast at 11oclock. No left axillary LAD   -23 biopsy confirming IDC, grade 3. ER negative, AR negative, Her2 2+, negative FISH. Ki67 80%  -23 MRI breast showed 1.6 x 1.0 x 1.2 cm left breast mass with no associated lymphadenopathy.    Gyn History:   Menarche: 14  Menopause: 46    Age at first pregnancy: 18  : Yes, with second child    Interval History:  Teresa presents today for C1D1 naddAC-T. Tolerated port placement without difficulty. Has been doing very well with smoking cessation- now down to 2 cigarettes per day or qod. Otherwise doing well and denies new complaints today.       Patient Active Problem List   Diagnosis    Encounter for colorectal cancer screening    Anxiety and depression    Overweight (BMI 25.0-29.9)    Need for hepatitis C screening test    Need for shingles vaccine    Bunion of great toe of left foot    Laceration of skin of forehead    Invasive ductal carcinoma of breast    Malignant neoplasm of upper-inner quadrant of left breast in female, estrogen receptor negative       Current Outpatient Medications   Medication Instructions    dexAMETHasone (DECADRON) 4 MG Tab Take 8mg (2 pills) po daily on days 2-4 of chemotherapy. Only for cycles 1-4    LIDOcaine-prilocaine (EMLA) cream Topical (Top), As needed (PRN)     "multivitamin capsule 1 capsule, Oral, Daily    OLANZapine (ZYPREXA) 5 MG tablet Take 5mg nightly on days 1-4 of chemotherapy    ondansetron (ZOFRAN) 8 mg, Oral, Every 12 hours PRN    venlafaxine (EFFEXOR) 37.5 mg, Oral, Daily     Review of Systems:   Answers submitted by the patient for this visit:  Review of Systems Questionnaire (Submitted on 8/28/2023)  appetite change : No  unexpected weight change: No  mouth sores: No  visual disturbance: No  cough: No  shortness of breath: No  chest pain: Yes  abdominal pain: No  diarrhea: Yes  frequency: No  back pain: No  rash: No  headaches: No  adenopathy: No  nervous/ anxious: Yes      PHYSICAL EXAM:  /85   Pulse 73   Temp 97.9 °F (36.6 °C) (Oral)   Resp 18   Ht 5' 6" (1.676 m)   Wt 78.4 kg (172 lb 12.7 oz)   LMP 10/15/2018 (Within Weeks)   SpO2 97%   BMI 27.89 kg/m²     ECOG 0  General: well appearing, in no apparent distress  HEENT: Normocephalic, EOMI, anicteric sclerae, MMM  Neck: supple, without cervical or supraclavicular lymphadenopathy.  Heart: regular rate and rhythm, normal S1 and S2, no murmurs, gallops or rubs.  Lungs: Clear to auscultation bilaterally, no increased wob  Breast: Left upper breast mass palpated, non-tender to palpation, non-mobile  Abdomen: Soft, nontender, nondistended with normal bowel sounds. No hepatosplenomegaly.  Extremities: No LE edema or joint effusion  Skin: warm, well-perfused, no rash  Neurologic: Alert and oriented x 4, normal speech and gait   Psychiatric: Conversing appropriately with providers throughout today's encounter.       Reviewed all recent labs, imaging and pathology.    Assessment & Plan:  Teresa is a dimitry 52yo woman with recently diagnosed cT1cN0 TNBC.     We previously reviewed the natural history of TNBC; given disease <2cm would favor proceeding with naddAC-T. Previously discussed dosing, logistic, supportive medications and potential side effects.     #TNBC:  --labs reviewed and ok to proceed with " C1D1 as scheduled   --echo 8/10/23 showing EF 60-65%; will repeat following completion of doxorubicin  --RTC in 2 weeks as scheduled       #Tobacco use disorder:  --1/2 ppd x 20 years; working to cut down on her own- now down to 2 cigarettes qod        All questions were answered to her apparent satisfaction. Will see her back in 2 weeks or sooner should the need arise.     Sonia Johnson MD       Route Chart for Scheduling    Med Onc Chart Routing      Follow up with physician 4 weeks. scheduled   Follow up with DARIANA 2 weeks. as scheduled   Infusion scheduling note   q2 weeks x4; then weekly x12 (scheduled)   Injection scheduling note day 2 injection for first 4 cycles   Labs CBC and CMP   Scheduling:  Preferred lab:  Lab interval: every 2 weeks     Imaging None      Pharmacy appointment No pharmacy appointment needed      Other referrals no referral to Oncology Primary Care needed -    No additional referrals needed             Treatment Plan Information   OP BREAST DOSE-DENSE AC-T (DOXORUBICIN CYCLOPHOSPHAMIDE Q2W FOLLOWED BY PACLITAXEL WEEKLY)   Sonia Johnson MD   Upcoming Treatment Dates - OP BREAST DOSE-DENSE AC-T (DOXORUBICIN CYCLOPHOSPHAMIDE Q2W FOLLOWED BY PACLITAXEL WEEKLY)    8/31/2023       Chemotherapy       DOXOrubicin chemo injection 112 mg       cycloPHOSphamide 600 mg/m2 = 1,120 mg in sodium chloride 0.9% 255.6 mL chemo infusion       Antiemetics       aprepitant (CINVANTI) injection 130 mg       palonosetron 0.25mg/dexAMETHasone 12mg in NS IVPB 0.25 mg 50 mL       prochlorperazine injection Soln 5 mg  9/1/2023       Growth Factor       pegfilgrastim-cbqv (UDENYCA) injection 6 mg  9/14/2023       Chemotherapy       DOXOrubicin chemo injection 112 mg       cycloPHOSphamide 600 mg/m2 = 1,120 mg in sodium chloride 0.9% 255.6 mL chemo infusion       Antiemetics       aprepitant (CINVANTI) injection 130 mg       palonosetron 0.25mg/dexAMETHasone 12mg in NS IVPB 0.25 mg 50 mL       prochlorperazine  injection Soln 5 mg  9/15/2023       Growth Factor       pegfilgrastim-cbqv (UDENYCA) injection 6 mg    Supportive Plan Information  OP PEGFILGRASTIM   Moraima Alvarado, NP   Upcoming Treatment Dates - OP PEGFILGRASTIM    No upcoming days in selected categories.

## 2023-08-31 ENCOUNTER — INFUSION (OUTPATIENT)
Dept: INFUSION THERAPY | Facility: HOSPITAL | Age: 53
End: 2023-08-31
Payer: COMMERCIAL

## 2023-08-31 ENCOUNTER — OFFICE VISIT (OUTPATIENT)
Dept: HEMATOLOGY/ONCOLOGY | Facility: CLINIC | Age: 53
End: 2023-08-31
Payer: COMMERCIAL

## 2023-08-31 VITALS
OXYGEN SATURATION: 97 % | DIASTOLIC BLOOD PRESSURE: 85 MMHG | HEART RATE: 73 BPM | HEIGHT: 66 IN | TEMPERATURE: 98 F | SYSTOLIC BLOOD PRESSURE: 122 MMHG | BODY MASS INDEX: 27.77 KG/M2 | WEIGHT: 172.81 LBS | RESPIRATION RATE: 18 BRPM

## 2023-08-31 VITALS
DIASTOLIC BLOOD PRESSURE: 78 MMHG | RESPIRATION RATE: 18 BRPM | HEART RATE: 67 BPM | TEMPERATURE: 98 F | HEIGHT: 66 IN | SYSTOLIC BLOOD PRESSURE: 139 MMHG | WEIGHT: 172.81 LBS | BODY MASS INDEX: 27.77 KG/M2

## 2023-08-31 DIAGNOSIS — F17.200 TOBACCO USE DISORDER: ICD-10-CM

## 2023-08-31 DIAGNOSIS — C50.912 INFILTRATING DUCTAL CARCINOMA OF LEFT BREAST: Primary | ICD-10-CM

## 2023-08-31 PROCEDURE — 3074F PR MOST RECENT SYSTOLIC BLOOD PRESSURE < 130 MM HG: ICD-10-PCS | Mod: CPTII,S$GLB,, | Performed by: STUDENT IN AN ORGANIZED HEALTH CARE EDUCATION/TRAINING PROGRAM

## 2023-08-31 PROCEDURE — 96367 TX/PROPH/DG ADDL SEQ IV INF: CPT

## 2023-08-31 PROCEDURE — 3074F SYST BP LT 130 MM HG: CPT | Mod: CPTII,S$GLB,, | Performed by: STUDENT IN AN ORGANIZED HEALTH CARE EDUCATION/TRAINING PROGRAM

## 2023-08-31 PROCEDURE — 99999 PR PBB SHADOW E&M-EST. PATIENT-LVL III: ICD-10-PCS | Mod: PBBFAC,,, | Performed by: STUDENT IN AN ORGANIZED HEALTH CARE EDUCATION/TRAINING PROGRAM

## 2023-08-31 PROCEDURE — 96411 CHEMO IV PUSH ADDL DRUG: CPT

## 2023-08-31 PROCEDURE — 63600175 PHARM REV CODE 636 W HCPCS: Mod: JZ,JG | Performed by: STUDENT IN AN ORGANIZED HEALTH CARE EDUCATION/TRAINING PROGRAM

## 2023-08-31 PROCEDURE — A4216 STERILE WATER/SALINE, 10 ML: HCPCS | Performed by: STUDENT IN AN ORGANIZED HEALTH CARE EDUCATION/TRAINING PROGRAM

## 2023-08-31 PROCEDURE — 3008F BODY MASS INDEX DOCD: CPT | Mod: CPTII,S$GLB,, | Performed by: STUDENT IN AN ORGANIZED HEALTH CARE EDUCATION/TRAINING PROGRAM

## 2023-08-31 PROCEDURE — 99215 OFFICE O/P EST HI 40 MIN: CPT | Mod: S$GLB,,, | Performed by: STUDENT IN AN ORGANIZED HEALTH CARE EDUCATION/TRAINING PROGRAM

## 2023-08-31 PROCEDURE — 3044F HG A1C LEVEL LT 7.0%: CPT | Mod: CPTII,S$GLB,, | Performed by: STUDENT IN AN ORGANIZED HEALTH CARE EDUCATION/TRAINING PROGRAM

## 2023-08-31 PROCEDURE — 99215 PR OFFICE/OUTPT VISIT, EST, LEVL V, 40-54 MIN: ICD-10-PCS | Mod: S$GLB,,, | Performed by: STUDENT IN AN ORGANIZED HEALTH CARE EDUCATION/TRAINING PROGRAM

## 2023-08-31 PROCEDURE — 99999 PR PBB SHADOW E&M-EST. PATIENT-LVL III: CPT | Mod: PBBFAC,,, | Performed by: STUDENT IN AN ORGANIZED HEALTH CARE EDUCATION/TRAINING PROGRAM

## 2023-08-31 PROCEDURE — 25000003 PHARM REV CODE 250: Performed by: STUDENT IN AN ORGANIZED HEALTH CARE EDUCATION/TRAINING PROGRAM

## 2023-08-31 PROCEDURE — 96375 TX/PRO/DX INJ NEW DRUG ADDON: CPT

## 2023-08-31 PROCEDURE — 3008F PR BODY MASS INDEX (BMI) DOCUMENTED: ICD-10-PCS | Mod: CPTII,S$GLB,, | Performed by: STUDENT IN AN ORGANIZED HEALTH CARE EDUCATION/TRAINING PROGRAM

## 2023-08-31 PROCEDURE — 96413 CHEMO IV INFUSION 1 HR: CPT

## 2023-08-31 PROCEDURE — 3044F PR MOST RECENT HEMOGLOBIN A1C LEVEL <7.0%: ICD-10-PCS | Mod: CPTII,S$GLB,, | Performed by: STUDENT IN AN ORGANIZED HEALTH CARE EDUCATION/TRAINING PROGRAM

## 2023-08-31 PROCEDURE — 3079F DIAST BP 80-89 MM HG: CPT | Mod: CPTII,S$GLB,, | Performed by: STUDENT IN AN ORGANIZED HEALTH CARE EDUCATION/TRAINING PROGRAM

## 2023-08-31 PROCEDURE — 3079F PR MOST RECENT DIASTOLIC BLOOD PRESSURE 80-89 MM HG: ICD-10-PCS | Mod: CPTII,S$GLB,, | Performed by: STUDENT IN AN ORGANIZED HEALTH CARE EDUCATION/TRAINING PROGRAM

## 2023-08-31 RX ORDER — SODIUM CHLORIDE 0.9 % (FLUSH) 0.9 %
10 SYRINGE (ML) INJECTION
Status: CANCELLED | OUTPATIENT
Start: 2023-08-31

## 2023-08-31 RX ORDER — PROCHLORPERAZINE EDISYLATE 5 MG/ML
5 INJECTION INTRAMUSCULAR; INTRAVENOUS ONCE AS NEEDED
Status: CANCELLED
Start: 2023-08-31

## 2023-08-31 RX ORDER — DOXORUBICIN HYDROCHLORIDE 2 MG/ML
60 INJECTION, SOLUTION INTRAVENOUS
Status: CANCELLED | OUTPATIENT
Start: 2023-08-31

## 2023-08-31 RX ORDER — HEPARIN 100 UNIT/ML
500 SYRINGE INTRAVENOUS
Status: DISCONTINUED | OUTPATIENT
Start: 2023-08-31 | End: 2023-08-31 | Stop reason: HOSPADM

## 2023-08-31 RX ORDER — PROCHLORPERAZINE EDISYLATE 5 MG/ML
5 INJECTION INTRAMUSCULAR; INTRAVENOUS ONCE AS NEEDED
Status: DISCONTINUED | OUTPATIENT
Start: 2023-08-31 | End: 2023-08-31 | Stop reason: HOSPADM

## 2023-08-31 RX ORDER — DOXORUBICIN HYDROCHLORIDE 2 MG/ML
60 INJECTION, SOLUTION INTRAVENOUS
Status: COMPLETED | OUTPATIENT
Start: 2023-08-31 | End: 2023-08-31

## 2023-08-31 RX ORDER — SODIUM CHLORIDE 0.9 % (FLUSH) 0.9 %
10 SYRINGE (ML) INJECTION
Status: DISCONTINUED | OUTPATIENT
Start: 2023-08-31 | End: 2023-08-31 | Stop reason: HOSPADM

## 2023-08-31 RX ORDER — HEPARIN 100 UNIT/ML
500 SYRINGE INTRAVENOUS
Status: CANCELLED | OUTPATIENT
Start: 2023-08-31

## 2023-08-31 RX ADMIN — APREPITANT 130 MG: 130 INJECTION, EMULSION INTRAVENOUS at 10:08

## 2023-08-31 RX ADMIN — Medication 10 ML: at 12:08

## 2023-08-31 RX ADMIN — DOXORUBICIN HYDROCHLORIDE 112 MG: 2 INJECTION, SOLUTION INTRAVENOUS at 11:08

## 2023-08-31 RX ADMIN — CYCLOPHOSPHAMIDE 1120 MG: 200 INJECTION, SOLUTION INTRAVENOUS at 11:08

## 2023-08-31 RX ADMIN — HEPARIN 500 UNITS: 100 SYRINGE at 12:08

## 2023-08-31 RX ADMIN — SODIUM CHLORIDE: 9 INJECTION, SOLUTION INTRAVENOUS at 10:08

## 2023-08-31 RX ADMIN — DEXAMETHASONE SODIUM PHOSPHATE 0.25 MG: 4 INJECTION, SOLUTION INTRA-ARTICULAR; INTRALESIONAL; INTRAMUSCULAR; INTRAVENOUS; SOFT TISSUE at 10:08

## 2023-08-31 NOTE — PLAN OF CARE
1242  Infusion completed, pt tolerated; pt instructed to increase water hydration daily r/t Cytoxan; discussed correct use of EMLA topical cream, provided Tegaderms for same; reviewed home med regimen, possible SE and how to treat; reviewed Udenyca injection tomorrow at [facility] and reason for same, possible SE and how to treat; discussed when to contact MD, when to report to ED; pt and mother verbalized understanding of all discussed and when to report next

## 2023-08-31 NOTE — PROGRESS NOTES
[facility]/ [facility]      Chief Complaint:   TNBC       Cancer Staging   Invasive ductal carcinoma of breast  Staging form: Breast, AJCC 8th Edition  - Clinical stage from 2023: Stage IB (cT1c, cN0, cM0, G3, ER-, CA-, HER2-) - Signed by Sonia Johnson MD on 2023      HPI:  Teresa Valdes is a 53 y.o. female who presents today for evaluation of newly diagnosed breast cancer. Her oncologic history is as follows:    -screening MMG 23 showing an asymmetry in the central L breast. Diagnostic MMG/US revealing a 19 x 12 x17mm complex cystic and solid mass seen in the L breast at 11oclock. No left axillary LAD   -23 biopsy confirming IDC, grade 3. ER negative, CA negative, Her2 2+, negative FISH. Ki67 80%  -23 MRI breast showed 1.6 x 1.0 x 1.2 cm left breast mass with no associated lymphadenopathy.    Gyn History:   Menarche: 14  Menopause: 46    Age at first pregnancy: 18  : Yes, with second child    Interval History:  Teresa presents today for C2D1 naddAC-T. She had severe back pain this past weekend, also had severe nausea and diarrhea. She also had some dizziness and nausea this weekend.   She did take tylenol for the back pain, states it was pulsating and burning.   Nausea is variable, she takes zofran. Appetite is good. Bowel movements are okay.   Alopecia. Dizziness this weekend and some headaches.   Denies bleeding, nose, urine and stool.  She is fatigued and has some shortness of breath with exertion.   Has been doing very well with smoking cessation- now down to 2 cigarettes per day or qod.       Patient Active Problem List   Diagnosis    Encounter for colorectal cancer screening    Anxiety and depression    Overweight (BMI 25.0-29.9)    Need for hepatitis C screening test    Need for shingles vaccine    Bunion of great toe of left foot    Laceration of skin of forehead    Invasive ductal carcinoma of  breast    Malignant neoplasm of upper-inner quadrant of left breast in female, estrogen receptor negative       Current Outpatient Medications   Medication Instructions    dexAMETHasone (DECADRON) 4 MG Tab Take 8mg (2 pills) po daily on days 2-4 of chemotherapy. Only for cycles 1-4    LIDOcaine-prilocaine (EMLA) cream Topical (Top), As needed (PRN)    multivitamin capsule 1 capsule, Oral, Daily    OLANZapine (ZYPREXA) 5 MG tablet Take 5mg nightly on days 1-4 of chemotherapy    ondansetron (ZOFRAN) 8 mg, Oral, Every 12 hours PRN    venlafaxine (EFFEXOR) 37.5 mg, Oral, Daily     Review of Systems:   See above      PHYSICAL EXAM:  LMP 10/15/2018 (Within Weeks)     ECOG 0  General: well appearing, in no apparent distress  HEENT: Normocephalic, EOMI, anicteric sclerae, MMM  Neck: supple, without cervical or supraclavicular lymphadenopathy.  Heart: regular rate and rhythm, normal S1 and S2, no murmurs, gallops or rubs.  Lungs: Clear to auscultation bilaterally, no increased wob  Breast: Left upper breast mass palpated Upper inner quadrant, 1.5 cm moveable mass, non-tender to palpation, non-mobile  Abdomen: Soft, nontender, nondistended with normal bowel sounds. No hepatosplenomegaly.  Extremities: No LE edema or joint effusion  Skin: warm, well-perfused, no rash  Neurologic: Alert and oriented x 4, normal speech and gait   Psychiatric: Conversing appropriately with providers throughout today's encounter.       Reviewed all recent labs, imaging and pathology.    Assessment & Plan:  Teresa is a dimitry 52yo woman with recently diagnosed cT1cN0 TNBC.     We previously reviewed the natural history of TNBC; given disease <2cm would favor proceeding with naddAC-T. Previously discussed dosing, logistic, supportive medications and potential side effects.     #TNBC:  --labs reviewed and ok to proceed with C2D1 as scheduled   --echo 8/10/23 showing EF 60-65%; will repeat following completion of doxorubicin  --RTC in 2 weeks as  scheduled       #Tobacco use disorder:  --1/2 ppd x 20 years; working to cut down on her own- now down to 2 cigarettes qod        All questions were answered to her apparent satisfaction. Will see her back in 2 weeks or sooner should the need arise.     Return to clinic in 2 weeks with MD appointment and labs.     Patient is in agreement with the proposed treatment plan. All questions were answered to the patient's satisfaction. Patient knows to call clinic for any new or worsening symptoms and if anything is needed before the next clinic visit.          Moraima Alvarado, TREVORP-C  Hematology & Medical Oncology   [facility] [place] [address]  ph. [phone]  Fax. [phone]    Collaborating physician, Dr. Salazar.    Approximately 15 minutes were spent face-to-face with the patient.  Approximately 25 minutes in total were spent on this encounter, which includes face-to-face time and non-face-to-face time preparing to see the patient (e.g., review of tests), obtaining and/or reviewing separately obtained history, documenting clinical information in the electronic or other health record, independently interpreting results (not separately reported) and communicating results to the patient/family/caregiver, or care coordination (not separately reported).        Route Chart for Scheduling    Med Onc Chart Routing      Follow up with physician . Appts scheduled are correct   Follow up with DARIANA    Infusion scheduling note    Injection scheduling note    Labs    Imaging    Pharmacy appointment    Other referrals              Treatment Plan Information   OP BREAST DOSE-DENSE AC-T (DOXORUBICIN CYCLOPHOSPHAMIDE Q2W FOLLOWED BY PACLITAXEL WEEKLY)   Sonia Johnson MD   Upcoming Treatment Dates - OP BREAST DOSE-DENSE AC-T (DOXORUBICIN CYCLOPHOSPHAMIDE Q2W FOLLOWED BY PACLITAXEL WEEKLY)    9/1/2023       Growth Factor       pegfilgrastim-cbqv (UDENYCA) injection 6 mg  9/14/2023       Chemotherapy        DOXOrubicin chemo injection 112 mg       cycloPHOSphamide 600 mg/m2 = 1,120 mg in sodium chloride 0.9% 255.6 mL chemo infusion       Antiemetics       aprepitant (CINVANTI) injection 130 mg       palonosetron 0.25mg/dexAMETHasone 12mg in NS IVPB 0.25 mg 50 mL       prochlorperazine injection Soln 5 mg  9/15/2023       Growth Factor       pegfilgrastim-cbqv (UDENYCA) injection 6 mg  9/28/2023       Chemotherapy       DOXOrubicin chemo injection 112 mg       cycloPHOSphamide 600 mg/m2 = 1,120 mg in sodium chloride 0.9% 255.6 mL chemo infusion       Antiemetics       aprepitant (CINVANTI) injection 130 mg       palonosetron 0.25mg/dexAMETHasone 12mg in NS IVPB 0.25 mg 50 mL       prochlorperazine injection Soln 5 mg    Supportive Plan Information  OP PEGFILGRASTIM   Moraima Alvarado NP   Upcoming Treatment Dates - OP PEGFILGRASTIM    No upcoming days in selected categories.

## 2023-08-31 NOTE — PROGRESS NOTES
The patient location is: [place]   The chief complaint leading to consultation is: new chemo start     Visit type: audiovisual    Face to Face time with patient: 22 minutes   34 minutes of total time spent on the encounter, which includes face to face time and non-face to face time preparing to see the patient (eg, review of tests), Obtaining and/or reviewing separately obtained history, Documenting clinical information in the electronic or other health record, Independently interpreting results (not separately reported) and communicating results to the patient/family/caregiver, or Care coordination (not separately reported).     Each patient to whom he or she provides medical services by telemedicine is:  (1) informed of the relationship between the physician and patient and the respective role of any other health care provider with respect to management of the patient; and (2) notified that he or she may decline to receive medical services by telemedicine and may withdraw from such care at any time.    Oncology Nutrition Assessment for Medical Nutrition Therapy  Initial Visit    Teresa PAULINO Keisha   1970    Referring Provider:  Kenna Ramirez MD      Reason for Visit: Pt in for education and nutrition counseling     PMHx:   Past Medical History:   Diagnosis Date    Anxiety     Depression     Hypertension     resolved    Ovarian cancer     Sebaceous cyst 4/2/2018       Nutrition Assessment    Patient is a 53 y.o.female with newly diagnosed TNBC. Started treatment with addAC-T 8/31. Referred to nutrition for concerns regarding fatigue and what to expect during treatment.   She reports she tolerated her first cycle of chemotherapy ok- yesterday was the worst day with some nausea and a little vomiting. She has been very fatigued. Having regular bowel movements and eating fairly normal. Taste buds have changed and she is craving more salt.   She has a history gastric sleeve surgery about 5 years ago. Slacked off  "vitamins about a year ago. Eats plenty of protein and eats 5-6 times per day. Has "meals" 2-3 times per day plus snacks. Eats chicken/fish/eggs an average of twice per day. Snacks on cheese, veggies, chewy granola bars, etc. Has veggies at meals. Likes fruit but doesn't eat much of it. 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 No intentional exercise.  Drinks water, some powerade zero.      Weight:78 kg (172 lb)  Height:5' 6" (1.676 m)  BMI:Body mass index is 27.76 kg/m².   IBW: Ideal body weight: 59.3 kg (130 lb 11.7 oz)  Adjusted ideal body weight: 66.8 kg (147 lb 3.8 oz)    Allergies: Patient has no known allergies.    Current Medications:    Current Outpatient Medications:     dexAMETHasone (DECADRON) 4 MG Tab, Take 8mg (2 pills) po daily on days 2-4 of chemotherapy. Only for cycles 1-4, Disp: 40 tablet, Rfl: 0    LIDOcaine-prilocaine (EMLA) cream, Apply topically as needed., Disp: 5 g, Rfl: 1    multivitamin capsule, Take 1 capsule by mouth once daily., Disp: , Rfl:     OLANZapine (ZYPREXA) 5 MG tablet, Take 5mg nightly on days 1-4 of chemotherapy, Disp: 30 tablet, Rfl: 2    ondansetron (ZOFRAN) 8 MG tablet, Take 1 tablet (8 mg total) by mouth every 12 (twelve) hours as needed for Nausea., Disp: 30 tablet, Rfl: 2    venlafaxine (EFFEXOR) 37.5 MG Tab, Take 1 tablet (37.5 mg total) by mouth once daily., Disp: 90 tablet, Rfl: 3    Vitamins/Supplements: none currently     Labs: Reviewed from 8/30 and 4/13- vitamin D noted    Nutrition Diagnosis    Problem: nutrition related knowledge deficit   Etiology (related to): lack of prior need for nutrition education  Signs/Symptoms (as evidenced by): new cancer diagnosis    Nutrition Intervention    Nutrition Prescription   1550 Kcals (20kcal/kg)  78-94 g protein (1-1.2g/kg)   9813-2859 mL fluid (25-30mL/kg)    Recommendations:  Bariatric multi wit B12  Separate calcium chews  Up to two servings of fruit per day (focus on berries)   Aim for a variety of colors " of fruits and vegetables daily   Focus on protein, vegetables and/or fruit at all meals snacks  -nuts, high protein granola bars, Greek yogurt, etc at snacks   Try movement throughout the day (even in 10 minute intervals)  Avoid too much daytime rest which can disrupt evening sleep    Nutrition Monitoring and Evaluation    Monitor: energy intake, diet tolerance , and diet education needs     Goals: improve diet quality, vitamin/ mineral intake     Follow up Patient will follow up via portal as needed with any questions/concerns     Communication to referring provider/care team: note available in chart     Counseling time: 15 Minutes    Ysabel Markham, MPH, RD, , LDN, FAND   [phone]

## 2023-08-31 NOTE — NURSING
6822  Pt here for AC, accompanied by mother, no complaints or concerns at present; discussed treatment plan, regimen, medications, possible SE and how to treat, home meds, infection prevention, daily hydration and mild activity; pt reports discussing all with MD and signing consent; all pt questions answered and pt agrees to proceed

## 2023-09-03 ENCOUNTER — PATIENT MESSAGE (OUTPATIENT)
Dept: ADMINISTRATIVE | Facility: OTHER | Age: 53
End: 2023-09-03
Payer: COMMERCIAL

## 2023-09-05 ENCOUNTER — CLINICAL SUPPORT (OUTPATIENT)
Dept: HEMATOLOGY/ONCOLOGY | Facility: CLINIC | Age: 53
End: 2023-09-05
Payer: COMMERCIAL

## 2023-09-05 VITALS — HEIGHT: 66 IN | WEIGHT: 172 LBS | BODY MASS INDEX: 27.64 KG/M2

## 2023-09-05 DIAGNOSIS — Z71.3 NUTRITIONAL COUNSELING: Primary | ICD-10-CM

## 2023-09-05 DIAGNOSIS — C50.912 INFILTRATING DUCTAL CARCINOMA OF LEFT BREAST: ICD-10-CM

## 2023-09-05 DIAGNOSIS — Z98.84 S/P BARIATRIC SURGERY: ICD-10-CM

## 2023-09-05 DIAGNOSIS — R53.83 FATIGUE, UNSPECIFIED TYPE: ICD-10-CM

## 2023-09-05 PROCEDURE — 97802 PR MED NUTR THER, 1ST, INDIV, EA 15 MIN: ICD-10-PCS | Mod: 95,,, | Performed by: DIETITIAN, REGISTERED

## 2023-09-05 PROCEDURE — 97802 MEDICAL NUTRITION INDIV IN: CPT | Mod: 95,,, | Performed by: DIETITIAN, REGISTERED

## 2023-09-07 ENCOUNTER — PATIENT MESSAGE (OUTPATIENT)
Dept: HEMATOLOGY/ONCOLOGY | Facility: CLINIC | Age: 53
End: 2023-09-07

## 2023-09-07 ENCOUNTER — TELEPHONE (OUTPATIENT)
Dept: HEMATOLOGY/ONCOLOGY | Facility: CLINIC | Age: 53
End: 2023-09-07

## 2023-09-07 NOTE — TELEPHONE ENCOUNTER
3 back to back attempts to call and reach pt regarding today's appt with provider. Unable to leave voicemail as box was full. Will send a portal message.    TROY De ext. [phone]

## 2023-09-14 ENCOUNTER — LAB VISIT (OUTPATIENT)
Dept: LAB | Facility: HOSPITAL | Age: 53
End: 2023-09-14
Payer: COMMERCIAL

## 2023-09-14 ENCOUNTER — OFFICE VISIT (OUTPATIENT)
Dept: HEMATOLOGY/ONCOLOGY | Facility: CLINIC | Age: 53
End: 2023-09-14
Payer: COMMERCIAL

## 2023-09-14 ENCOUNTER — INFUSION (OUTPATIENT)
Dept: INFUSION THERAPY | Facility: HOSPITAL | Age: 53
End: 2023-09-14
Payer: COMMERCIAL

## 2023-09-14 ENCOUNTER — TELEPHONE (OUTPATIENT)
Dept: INFUSION THERAPY | Facility: HOSPITAL | Age: 53
End: 2023-09-14
Payer: COMMERCIAL

## 2023-09-14 VITALS
DIASTOLIC BLOOD PRESSURE: 73 MMHG | SYSTOLIC BLOOD PRESSURE: 117 MMHG | HEIGHT: 66 IN | TEMPERATURE: 98 F | HEART RATE: 78 BPM | RESPIRATION RATE: 18 BRPM | BODY MASS INDEX: 28.19 KG/M2 | WEIGHT: 175.38 LBS

## 2023-09-14 VITALS
SYSTOLIC BLOOD PRESSURE: 122 MMHG | OXYGEN SATURATION: 96 % | HEART RATE: 78 BPM | HEIGHT: 66 IN | RESPIRATION RATE: 18 BRPM | TEMPERATURE: 99 F | BODY MASS INDEX: 28.19 KG/M2 | DIASTOLIC BLOOD PRESSURE: 74 MMHG | WEIGHT: 175.38 LBS

## 2023-09-14 DIAGNOSIS — C50.912 INFILTRATING DUCTAL CARCINOMA OF LEFT BREAST: ICD-10-CM

## 2023-09-14 DIAGNOSIS — C50.912 INFILTRATING DUCTAL CARCINOMA OF LEFT BREAST: Primary | ICD-10-CM

## 2023-09-14 LAB
ALBUMIN SERPL BCP-MCNC: 3.6 G/DL (ref 3.5–5.2)
ALP SERPL-CCNC: 95 U/L (ref 55–135)
ALT SERPL W/O P-5'-P-CCNC: 14 U/L (ref 10–44)
ANION GAP SERPL CALC-SCNC: 12 MMOL/L (ref 8–16)
AST SERPL-CCNC: 19 U/L (ref 10–40)
BILIRUB SERPL-MCNC: 0.1 MG/DL (ref 0.1–1)
BUN SERPL-MCNC: 9 MG/DL (ref 6–20)
CALCIUM SERPL-MCNC: 8.8 MG/DL (ref 8.7–10.5)
CHLORIDE SERPL-SCNC: 110 MMOL/L (ref 95–110)
CO2 SERPL-SCNC: 22 MMOL/L (ref 23–29)
CREAT SERPL-MCNC: 0.7 MG/DL (ref 0.5–1.4)
EST. GFR  (NO RACE VARIABLE): >60 ML/MIN/1.73 M^2
GLUCOSE SERPL-MCNC: 113 MG/DL (ref 70–110)
POTASSIUM SERPL-SCNC: 3.8 MMOL/L (ref 3.5–5.1)
PROT SERPL-MCNC: 6.9 G/DL (ref 6–8.4)
SODIUM SERPL-SCNC: 144 MMOL/L (ref 136–145)

## 2023-09-14 PROCEDURE — 96413 CHEMO IV INFUSION 1 HR: CPT

## 2023-09-14 PROCEDURE — 3078F PR MOST RECENT DIASTOLIC BLOOD PRESSURE < 80 MM HG: ICD-10-PCS | Mod: CPTII,S$GLB,, | Performed by: NURSE PRACTITIONER

## 2023-09-14 PROCEDURE — 3074F SYST BP LT 130 MM HG: CPT | Mod: CPTII,S$GLB,, | Performed by: NURSE PRACTITIONER

## 2023-09-14 PROCEDURE — 3044F PR MOST RECENT HEMOGLOBIN A1C LEVEL <7.0%: ICD-10-PCS | Mod: CPTII,S$GLB,, | Performed by: NURSE PRACTITIONER

## 2023-09-14 PROCEDURE — 3008F BODY MASS INDEX DOCD: CPT | Mod: CPTII,S$GLB,, | Performed by: NURSE PRACTITIONER

## 2023-09-14 PROCEDURE — 99999 PR PBB SHADOW E&M-EST. PATIENT-LVL IV: ICD-10-PCS | Mod: PBBFAC,,, | Performed by: NURSE PRACTITIONER

## 2023-09-14 PROCEDURE — 63600175 PHARM REV CODE 636 W HCPCS: Mod: JG | Performed by: NURSE PRACTITIONER

## 2023-09-14 PROCEDURE — 3044F HG A1C LEVEL LT 7.0%: CPT | Mod: CPTII,S$GLB,, | Performed by: NURSE PRACTITIONER

## 2023-09-14 PROCEDURE — 99999 PR PBB SHADOW E&M-EST. PATIENT-LVL IV: CPT | Mod: PBBFAC,,, | Performed by: NURSE PRACTITIONER

## 2023-09-14 PROCEDURE — 96367 TX/PROPH/DG ADDL SEQ IV INF: CPT

## 2023-09-14 PROCEDURE — 99215 PR OFFICE/OUTPT VISIT, EST, LEVL V, 40-54 MIN: ICD-10-PCS | Mod: S$GLB,,, | Performed by: NURSE PRACTITIONER

## 2023-09-14 PROCEDURE — 3078F DIAST BP <80 MM HG: CPT | Mod: CPTII,S$GLB,, | Performed by: NURSE PRACTITIONER

## 2023-09-14 PROCEDURE — 96376 TX/PRO/DX INJ SAME DRUG ADON: CPT

## 2023-09-14 PROCEDURE — 3074F PR MOST RECENT SYSTOLIC BLOOD PRESSURE < 130 MM HG: ICD-10-PCS | Mod: CPTII,S$GLB,, | Performed by: NURSE PRACTITIONER

## 2023-09-14 PROCEDURE — 36415 COLL VENOUS BLD VENIPUNCTURE: CPT | Performed by: NURSE PRACTITIONER

## 2023-09-14 PROCEDURE — A4216 STERILE WATER/SALINE, 10 ML: HCPCS | Performed by: NURSE PRACTITIONER

## 2023-09-14 PROCEDURE — 99215 OFFICE O/P EST HI 40 MIN: CPT | Mod: S$GLB,,, | Performed by: NURSE PRACTITIONER

## 2023-09-14 PROCEDURE — 25000003 PHARM REV CODE 250: Performed by: NURSE PRACTITIONER

## 2023-09-14 PROCEDURE — 80053 COMPREHEN METABOLIC PANEL: CPT | Performed by: NURSE PRACTITIONER

## 2023-09-14 PROCEDURE — 3008F PR BODY MASS INDEX (BMI) DOCUMENTED: ICD-10-PCS | Mod: CPTII,S$GLB,, | Performed by: NURSE PRACTITIONER

## 2023-09-14 PROCEDURE — 96411 CHEMO IV PUSH ADDL DRUG: CPT

## 2023-09-14 RX ORDER — SODIUM CHLORIDE 0.9 % (FLUSH) 0.9 %
10 SYRINGE (ML) INJECTION
Status: CANCELLED | OUTPATIENT
Start: 2023-09-14

## 2023-09-14 RX ORDER — DOXORUBICIN HYDROCHLORIDE 2 MG/ML
60 INJECTION, SOLUTION INTRAVENOUS
Status: COMPLETED | OUTPATIENT
Start: 2023-09-14 | End: 2023-09-14

## 2023-09-14 RX ORDER — DOXORUBICIN HYDROCHLORIDE 2 MG/ML
60 INJECTION, SOLUTION INTRAVENOUS
Status: CANCELLED | OUTPATIENT
Start: 2023-09-14

## 2023-09-14 RX ORDER — PROCHLORPERAZINE EDISYLATE 5 MG/ML
5 INJECTION INTRAMUSCULAR; INTRAVENOUS ONCE AS NEEDED
Status: CANCELLED
Start: 2023-09-14

## 2023-09-14 RX ORDER — HEPARIN 100 UNIT/ML
500 SYRINGE INTRAVENOUS
Status: DISCONTINUED | OUTPATIENT
Start: 2023-09-14 | End: 2023-09-14 | Stop reason: HOSPADM

## 2023-09-14 RX ORDER — SODIUM CHLORIDE 0.9 % (FLUSH) 0.9 %
10 SYRINGE (ML) INJECTION
Status: DISCONTINUED | OUTPATIENT
Start: 2023-09-14 | End: 2023-09-14 | Stop reason: HOSPADM

## 2023-09-14 RX ORDER — PROCHLORPERAZINE EDISYLATE 5 MG/ML
5 INJECTION INTRAMUSCULAR; INTRAVENOUS ONCE AS NEEDED
Status: DISCONTINUED | OUTPATIENT
Start: 2023-09-14 | End: 2023-09-14 | Stop reason: HOSPADM

## 2023-09-14 RX ORDER — HEPARIN 100 UNIT/ML
500 SYRINGE INTRAVENOUS
Status: CANCELLED | OUTPATIENT
Start: 2023-09-14

## 2023-09-14 RX ADMIN — SODIUM CHLORIDE: 9 INJECTION, SOLUTION INTRAVENOUS at 10:09

## 2023-09-14 RX ADMIN — APREPITANT 130 MG: 130 INJECTION, EMULSION INTRAVENOUS at 10:09

## 2023-09-14 RX ADMIN — DOXORUBICIN HYDROCHLORIDE 112 MG: 2 INJECTION, SOLUTION INTRAVENOUS at 11:09

## 2023-09-14 RX ADMIN — CYCLOPHOSPHAMIDE 1120 MG: 200 INJECTION, SOLUTION INTRAVENOUS at 11:09

## 2023-09-14 RX ADMIN — Medication 10 ML: at 11:09

## 2023-09-14 RX ADMIN — HEPARIN 500 UNITS: 100 SYRINGE at 11:09

## 2023-09-14 RX ADMIN — DEXAMETHASONE SODIUM PHOSPHATE 0.25 MG: 4 INJECTION, SOLUTION INTRA-ARTICULAR; INTRALESIONAL; INTRAMUSCULAR; INTRAVENOUS; SOFT TISSUE at 10:09

## 2023-09-14 NOTE — PLAN OF CARE
1206  Infusion completed, pt tolerated; pt instructed to increase water hydration daily; discussed when to contact MD, when to report to ED; pt and mother verbalized understanding of all discussed and when to report next

## 2023-09-14 NOTE — NURSING
1044  Home med dexamethasone 8mg daily, D2 thru D4, C1-C4 taken with AC only, discussed with pt who verbalized understanding

## 2023-09-14 NOTE — NURSING
0957  Pt here for AC, accompanied by mother, reports episode of nausea, diarrhea and back pain x 24 hours on 9/8/23, now resolved; no other new complaints or concerns at present; discussed treatment plan for today, all questions answered and pt agrees to proceed

## 2023-09-15 ENCOUNTER — OFFICE VISIT (OUTPATIENT)
Dept: FAMILY MEDICINE | Facility: CLINIC | Age: 53
End: 2023-09-15
Payer: COMMERCIAL

## 2023-09-15 VITALS
HEIGHT: 66 IN | HEART RATE: 75 BPM | BODY MASS INDEX: 28.33 KG/M2 | WEIGHT: 176.25 LBS | DIASTOLIC BLOOD PRESSURE: 84 MMHG | OXYGEN SATURATION: 98 % | SYSTOLIC BLOOD PRESSURE: 128 MMHG

## 2023-09-15 DIAGNOSIS — Z23 FLU VACCINE NEED: Primary | ICD-10-CM

## 2023-09-15 DIAGNOSIS — E55.9 VITAMIN D DEFICIENCY DISEASE: ICD-10-CM

## 2023-09-15 DIAGNOSIS — F41.1 GAD (GENERALIZED ANXIETY DISORDER): ICD-10-CM

## 2023-09-15 DIAGNOSIS — Z23 FLU VACCINE NEED: ICD-10-CM

## 2023-09-15 DIAGNOSIS — F32.5 MAJOR DEPRESSIVE DISORDER WITH SINGLE EPISODE, IN FULL REMISSION: ICD-10-CM

## 2023-09-15 DIAGNOSIS — D75.89 MACROCYTOSIS: ICD-10-CM

## 2023-09-15 DIAGNOSIS — Z17.1 MALIGNANT NEOPLASM OF UPPER-OUTER QUADRANT OF LEFT BREAST IN FEMALE, ESTROGEN RECEPTOR NEGATIVE: ICD-10-CM

## 2023-09-15 DIAGNOSIS — Z23 NEED FOR SHINGLES VACCINE: ICD-10-CM

## 2023-09-15 DIAGNOSIS — C50.412 MALIGNANT NEOPLASM OF UPPER-OUTER QUADRANT OF LEFT BREAST IN FEMALE, ESTROGEN RECEPTOR NEGATIVE: ICD-10-CM

## 2023-09-15 PROCEDURE — 3044F HG A1C LEVEL LT 7.0%: CPT | Mod: CPTII,S$GLB,, | Performed by: STUDENT IN AN ORGANIZED HEALTH CARE EDUCATION/TRAINING PROGRAM

## 2023-09-15 PROCEDURE — 3079F PR MOST RECENT DIASTOLIC BLOOD PRESSURE 80-89 MM HG: ICD-10-PCS | Mod: CPTII,S$GLB,, | Performed by: STUDENT IN AN ORGANIZED HEALTH CARE EDUCATION/TRAINING PROGRAM

## 2023-09-15 PROCEDURE — 99214 OFFICE O/P EST MOD 30 MIN: CPT | Mod: S$GLB,,, | Performed by: STUDENT IN AN ORGANIZED HEALTH CARE EDUCATION/TRAINING PROGRAM

## 2023-09-15 PROCEDURE — 99999 PR PBB SHADOW E&M-EST. PATIENT-LVL IV: ICD-10-PCS | Mod: PBBFAC,,, | Performed by: STUDENT IN AN ORGANIZED HEALTH CARE EDUCATION/TRAINING PROGRAM

## 2023-09-15 PROCEDURE — 3074F PR MOST RECENT SYSTOLIC BLOOD PRESSURE < 130 MM HG: ICD-10-PCS | Mod: CPTII,S$GLB,, | Performed by: STUDENT IN AN ORGANIZED HEALTH CARE EDUCATION/TRAINING PROGRAM

## 2023-09-15 PROCEDURE — 3074F SYST BP LT 130 MM HG: CPT | Mod: CPTII,S$GLB,, | Performed by: STUDENT IN AN ORGANIZED HEALTH CARE EDUCATION/TRAINING PROGRAM

## 2023-09-15 PROCEDURE — 3079F DIAST BP 80-89 MM HG: CPT | Mod: CPTII,S$GLB,, | Performed by: STUDENT IN AN ORGANIZED HEALTH CARE EDUCATION/TRAINING PROGRAM

## 2023-09-15 PROCEDURE — 3044F PR MOST RECENT HEMOGLOBIN A1C LEVEL <7.0%: ICD-10-PCS | Mod: CPTII,S$GLB,, | Performed by: STUDENT IN AN ORGANIZED HEALTH CARE EDUCATION/TRAINING PROGRAM

## 2023-09-15 PROCEDURE — 99214 PR OFFICE/OUTPT VISIT, EST, LEVL IV, 30-39 MIN: ICD-10-PCS | Mod: S$GLB,,, | Performed by: STUDENT IN AN ORGANIZED HEALTH CARE EDUCATION/TRAINING PROGRAM

## 2023-09-15 PROCEDURE — 1159F MED LIST DOCD IN RCRD: CPT | Mod: CPTII,S$GLB,, | Performed by: STUDENT IN AN ORGANIZED HEALTH CARE EDUCATION/TRAINING PROGRAM

## 2023-09-15 PROCEDURE — 3008F BODY MASS INDEX DOCD: CPT | Mod: CPTII,S$GLB,, | Performed by: STUDENT IN AN ORGANIZED HEALTH CARE EDUCATION/TRAINING PROGRAM

## 2023-09-15 PROCEDURE — 1160F PR REVIEW ALL MEDS BY PRESCRIBER/CLIN PHARMACIST DOCUMENTED: ICD-10-PCS | Mod: CPTII,S$GLB,, | Performed by: STUDENT IN AN ORGANIZED HEALTH CARE EDUCATION/TRAINING PROGRAM

## 2023-09-15 PROCEDURE — 1159F PR MEDICATION LIST DOCUMENTED IN MEDICAL RECORD: ICD-10-PCS | Mod: CPTII,S$GLB,, | Performed by: STUDENT IN AN ORGANIZED HEALTH CARE EDUCATION/TRAINING PROGRAM

## 2023-09-15 PROCEDURE — 3008F PR BODY MASS INDEX (BMI) DOCUMENTED: ICD-10-PCS | Mod: CPTII,S$GLB,, | Performed by: STUDENT IN AN ORGANIZED HEALTH CARE EDUCATION/TRAINING PROGRAM

## 2023-09-15 PROCEDURE — 1160F RVW MEDS BY RX/DR IN RCRD: CPT | Mod: CPTII,S$GLB,, | Performed by: STUDENT IN AN ORGANIZED HEALTH CARE EDUCATION/TRAINING PROGRAM

## 2023-09-15 PROCEDURE — 99999 PR PBB SHADOW E&M-EST. PATIENT-LVL IV: CPT | Mod: PBBFAC,,, | Performed by: STUDENT IN AN ORGANIZED HEALTH CARE EDUCATION/TRAINING PROGRAM

## 2023-09-15 RX ORDER — ERGOCALCIFEROL 1.25 MG/1
50000 CAPSULE ORAL
Qty: 12 CAPSULE | Refills: 3 | Status: SHIPPED | OUTPATIENT
Start: 2023-09-15

## 2023-09-15 RX ORDER — ZOSTER VACCINE RECOMBINANT, ADJUVANTED 50 MCG/0.5
KIT INTRAMUSCULAR
Qty: 1 EACH | Refills: 1 | Status: SHIPPED | OUTPATIENT
Start: 2023-09-15 | End: 2023-09-15

## 2023-09-15 NOTE — PROGRESS NOTES
[facility] Note    Chief Complaint      Chief Complaint   Patient presents with    Follow-up     History of Present Illness      Suture / Staple Removal        Teresa Valdes is a 53 y.o. female with Anxiety /MDD and cigarette smoking recently diagnosed with triple negative stage 1B invasive ductal carcinoma of the left breast  (7/24/2023) s/p IR tunneled catheter and 2nd cycle of chemotherapy who presents for f/u o chronic conditions . She endorses no new complaints, doing well except a mild acute LBP after her first cycle of chemotherapy which has since resolved and hair loss as well. She has good social support from family and friends since new diagnosis and described her appetite to be great as well.    Hem Oncologist : Dr Elizabeth Scott    Problem List Addressed This Visit:    As listed below    Health Maintenance   Topic Date Due    Shingles Vaccine (1 of 2) Never done    Mammogram  07/24/2024    Colorectal Cancer Screening  04/11/2026    TETANUS VACCINE  02/05/2028    Lipid Panel  04/13/2028    Hepatitis C Screening  Completed       Past Medical History:   Diagnosis Date    Anxiety     Depression     Hypertension     resolved    Ovarian cancer     Sebaceous cyst 4/2/2018       Past Surgical History:   Procedure Laterality Date    ADENOIDECTOMY      BREAST CYST EXCISION Right     BREAST SURGERY      Abscess     CHOLECYSTECTOMY      COLONOSCOPY N/A 4/11/2023    Procedure: COLONOSCOPY;  Surgeon: Mona Valverde MD;  Location: [facility];  Service: Endoscopy;  Laterality: N/A;    CYST REMOVAL      on back    cyst removed left wrist      KNEE LIGAMENT RECONSTRUCTION Left     sleeve gastroectomy  06/2017    in [place]    TONSILLECTOMY      Uvuloplasty         family history includes Arthritis in her mother; Breast cancer in her paternal grandmother; Heart disease in her father; Hypertension in her father.    Social History     Tobacco Use    Smoking status: Some Days     Current packs/day:  0.50     Average packs/day: 0.5 packs/day for 6.5 years (3.3 ttl pk-yrs)     Types: Cigarettes     Start date: 3/7/2017    Smokeless tobacco: Never    Tobacco comments:     Working on quitting    Substance Use Topics    Alcohol use: Yes     Alcohol/week: 2.0 standard drinks of alcohol     Types: 2 Glasses of wine per week     Comment: occ    Drug use: Never       Review of Systems   Constitutional:  Negative for fatigue and fever.   Respiratory:  Negative for cough and chest tightness.    Gastrointestinal:  Negative for abdominal pain, diarrhea and vomiting.   Endocrine: Negative for polydipsia and polyphagia.   Genitourinary:  Negative for difficulty urinating, dysuria and frequency.   Musculoskeletal:  Negative for arthralgias, back pain, gait problem, joint swelling and neck pain.   Skin:  Negative for rash.   Neurological:  Negative for seizures, weakness, numbness and headaches.   Psychiatric/Behavioral:  Negative for sleep disturbance.        Outpatient Encounter Medications as of 9/15/2023   Medication Sig Dispense Refill    dexAMETHasone (DECADRON) 4 MG Tab Take 8mg (2 pills) po daily on days 2-4 of chemotherapy. Only for cycles 1-4 40 tablet 0    LIDOcaine-prilocaine (EMLA) cream Apply topically as needed. 5 g 1    multivitamin capsule Take 1 capsule by mouth once daily.      OLANZapine (ZYPREXA) 5 MG tablet Take 5mg nightly on days 1-4 of chemotherapy 30 tablet 2    ondansetron (ZOFRAN) 8 MG tablet Take 1 tablet (8 mg total) by mouth every 12 (twelve) hours as needed for Nausea. 30 tablet 2    venlafaxine (EFFEXOR) 37.5 MG Tab Take 1 tablet (37.5 mg total) by mouth once daily. 90 tablet 3    ergocalciferol (ERGOCALCIFEROL) 50,000 unit Cap Take 1 capsule (50,000 Units total) by mouth every 7 days. 12 capsule 3    [DISCONTINUED] varicella-zoster gE-AS01B, PF, (SHINGRIX, PF,) 50 mcg/0.5 mL injection Inject 0.5mL IM at 0 and 2-6 months 1 each 1     Facility-Administered Encounter Medications as of 9/15/2023  "  Medication Dose Route Frequency Provider Last Rate Last Admin    [COMPLETED] aprepitant (CINVANTI) injection 130 mg  130 mg Intravenous 1 time in Clinic/[facility] Moraima Alvarado NP   130 mg at 09/14/23 1026    [COMPLETED] cycloPHOSphamide 600 mg/m2 = 1,120 mg in sodium chloride 0.9% 290.6 mL chemo infusion  600 mg/m2 (Treatment Plan Recorded) Intravenous 1 time in Clinic/[facility] Moraima Alvarado NP   Stopped at 09/14/23 1149    [COMPLETED] DOXOrubicin chemo injection 112 mg  60 mg/m2 (Treatment Plan Recorded) Intravenous 1 time in Clinic/[facility] Moraima Alvarado NP   Stopped at 09/14/23 1118    [COMPLETED] palonosetron 0.25mg/dexAMETHasone 12mg in NS IVPB 0.25 mg 50 mL  0.25 mg Intravenous 1 time in Clinic/[facility] Moraima Alvarado NP   Stopped at 09/14/23 1053    [COMPLETED] pegfilgrastim-cbqv (UDENYCA) injection 6 mg  6 mg Subcutaneous 1 time in Clinic/[facility] Moraima Alvarado NP   6 mg at 09/15/23 1351    [COMPLETED] sodium chloride 0.9% 250 mL flush bag   Intravenous 1 time in Clinic/[facility] Moraima Alvarado NP   Stopped at 09/14/23 1156    varicella-zoster gE-AS01B (PF)(SHINGRIX) 50 mcg/0.5 mL injection  0.5 mL Intramuscular 1 time in Clinic/[facility] Maryse Abreu MD        [DISCONTINUED] alteplase injection 2 mg  2 mg Intra-Catheter PRN Moraima Alvarado NP        [DISCONTINUED] heparin, porcine (PF) 100 unit/mL injection flush 500 Units  500 Units Intravenous PRN Moraima Alvarado NP   500 Units at 09/14/23 1158    [DISCONTINUED] prochlorperazine injection Soln 5 mg  5 mg Intravenous Once PRN Moraima Alvarado NP        [DISCONTINUED] sodium chloride 0.9% flush 10 mL  10 mL Intravenous PRN Moraima Alvarado NP   10 mL at 09/14/23 1158        Review of patient's allergies indicates:  No Known Allergies    Physical Exam      Vital Signs  Pulse: 75  SpO2: 98 %  BP: 128/84  Pain Score: 0-No pain  Height and Weight  Height: 5' 6" (167.6 cm)  Weight: 79.9 kg (176 lb 4.1 oz)  BSA (Calculated - sq " m): 1.93 sq meters  BMI (Calculated): 28.5  Weight in (lb) to have BMI = 25: 154.6]    Physical Exam  Vitals reviewed.   Constitutional:       Appearance: Normal appearance.   HENT:      Head: Normocephalic and atraumatic.      Right Ear: Tympanic membrane normal.      Left Ear: Tympanic membrane normal.      Mouth/Throat:      Mouth: Mucous membranes are moist.      Pharynx: Oropharynx is clear.   Eyes:      Extraocular Movements: Extraocular movements intact.      Conjunctiva/sclera: Conjunctivae normal.      Pupils: Pupils are equal, round, and reactive to light.   Cardiovascular:      Rate and Rhythm: Normal rate and regular rhythm.      Pulses: Normal pulses.      Heart sounds: Normal heart sounds.   Pulmonary:      Effort: Pulmonary effort is normal.      Breath sounds: Normal breath sounds.   Abdominal:      General: Abdomen is flat. Bowel sounds are normal.      Palpations: Abdomen is soft.   Musculoskeletal:         General: No deformity.      Cervical back: Normal range of motion.      Right lower leg: No edema.      Left lower leg: No edema.   Skin:     General: Skin is warm and dry.   Neurological:      General: No focal deficit present.      Mental Status: She is alert and oriented to person, place, and time.      Sensory: No sensory deficit.   Psychiatric:         Mood and Affect: Mood normal.         Behavior: Behavior normal.          Laboratory:  CBC:  Recent Labs   Lab Result Units 08/15/23  0805 08/30/23  0908 09/13/23  1525   WBC K/uL 5.16 4.23 8.10   RBC M/uL 3.83* 4.12 3.47*   Hemoglobin g/dL 13.5 14.2 12.3   Hematocrit % 39.4 41.2 35.1*   Platelets K/uL 179 212 214   MCV fL 103* 100* 101*   MCH pg 35.2* 34.5* 35.4*   MCHC g/dL 34.3 34.5 35.0       CMP:  Recent Labs   Lab Result Units 08/30/23  0908 09/14/23  0909   Glucose mg/dL 87 113*   Calcium mg/dL 9.0 8.8   Albumin g/dL 4.1 3.6   Total Protein g/dL 7.4 6.9   Sodium mmol/L 141 144   Potassium mmol/L 4.3 3.8   CO2 mmol/L 27 22*   Chloride  "mmol/L 106 110   BUN mg/dL 15 9   Alkaline Phosphatase U/L 73 95   ALT U/L 14 14   AST U/L 24 19   Total Bilirubin mg/dL 0.6 0.1       URINALYSIS:  No results for input(s): "COLORU", "CLARITYU", "SPECGRAV", "PHUR", "PROTEINUA", "GLUCOSEU", "BILIRUBINCON", "BLOODU", "WBCU", "RBCU", "BACTERIA", "MUCUS", "NITRITE", "LEUKOCYTESUR", "UROBILINOGEN", "HYALINECASTS" in the last 2160 hours.   LIPIDS:  No results for input(s): "TSH", "HDL", "CHOL", "TRIG", "LDLCALC", "CHOLHDL", "NONHDLCHOL", "TOTALCHOLEST" in the last 2160 hours.  TSH:  No results for input(s): "TSH" in the last 2160 hours.  A1C:  No results for input(s): "HGBA1C" in the last 2160 hours.    Radiology:      Assessment/Plan     Teresa Valdes is a 53 y.o.female with:    1. Anxiety and depression  -well controlled on current regimen    2. Vit D deficiency  -serum vit D @ 14  -c/w vit D supplement, refill provided    3. Stage 1B invasive ductal carcinoma of the left breast    -diagnosed (7/24/2023)   -triple negative  -s/p 2nd cycle of chemotherapy  -followed by Hem/oncologist : Dr Elizabeth Scott    4. Macrocytosis  -serum Vit B12 and folate WNL  -H&H WNL  -monitor for now      -Continue current medications and maintain follow up with specialists.      Patient verbalizes understanding and agrees with current treatment plan.      Dr Maryse Abreu MD  [facility]"

## 2023-09-18 ENCOUNTER — CLINICAL SUPPORT (OUTPATIENT)
Dept: FAMILY MEDICINE | Facility: CLINIC | Age: 53
End: 2023-09-18
Payer: COMMERCIAL

## 2023-09-18 ENCOUNTER — PATIENT MESSAGE (OUTPATIENT)
Dept: HEMATOLOGY/ONCOLOGY | Facility: CLINIC | Age: 53
End: 2023-09-18
Payer: COMMERCIAL

## 2023-09-18 DIAGNOSIS — Z23 FLU VACCINE NEED: Primary | ICD-10-CM

## 2023-09-18 DIAGNOSIS — Z23 NEED FOR SHINGLES VACCINE: ICD-10-CM

## 2023-09-18 PROCEDURE — 90471 IMMUNIZATION ADMIN: CPT | Mod: S$GLB,,, | Performed by: STUDENT IN AN ORGANIZED HEALTH CARE EDUCATION/TRAINING PROGRAM

## 2023-09-18 PROCEDURE — 90750 ZOSTER RECOMBINANT VACCINE: ICD-10-PCS | Mod: S$GLB,,, | Performed by: STUDENT IN AN ORGANIZED HEALTH CARE EDUCATION/TRAINING PROGRAM

## 2023-09-18 PROCEDURE — 90472 IMMUNIZATION ADMIN EACH ADD: CPT | Mod: S$GLB,,, | Performed by: STUDENT IN AN ORGANIZED HEALTH CARE EDUCATION/TRAINING PROGRAM

## 2023-09-18 PROCEDURE — 90471 FLU VACCINE (QUAD) GREATER THAN OR EQUAL TO 3YO PRESERVATIVE FREE IM: ICD-10-PCS | Mod: S$GLB,,, | Performed by: STUDENT IN AN ORGANIZED HEALTH CARE EDUCATION/TRAINING PROGRAM

## 2023-09-18 PROCEDURE — 90686 FLU VACCINE (QUAD) GREATER THAN OR EQUAL TO 3YO PRESERVATIVE FREE IM: ICD-10-PCS | Mod: S$GLB,,, | Performed by: STUDENT IN AN ORGANIZED HEALTH CARE EDUCATION/TRAINING PROGRAM

## 2023-09-18 PROCEDURE — 90750 HZV VACC RECOMBINANT IM: CPT | Mod: S$GLB,,, | Performed by: STUDENT IN AN ORGANIZED HEALTH CARE EDUCATION/TRAINING PROGRAM

## 2023-09-18 PROCEDURE — 90686 IIV4 VACC NO PRSV 0.5 ML IM: CPT | Mod: S$GLB,,, | Performed by: STUDENT IN AN ORGANIZED HEALTH CARE EDUCATION/TRAINING PROGRAM

## 2023-09-18 PROCEDURE — 90472 ZOSTER RECOMBINANT VACCINE: ICD-10-PCS | Mod: S$GLB,,, | Performed by: STUDENT IN AN ORGANIZED HEALTH CARE EDUCATION/TRAINING PROGRAM

## 2023-09-18 NOTE — PROGRESS NOTES
Shingles vaccine administered in left arm and flu vaccine was administered in right arm. Pt advised to wait 15 minutes after receiving. Pt verbally understood

## 2023-09-20 ENCOUNTER — PATIENT MESSAGE (OUTPATIENT)
Dept: HEMATOLOGY/ONCOLOGY | Facility: CLINIC | Age: 53
End: 2023-09-20
Payer: COMMERCIAL

## 2023-09-25 ENCOUNTER — PATIENT MESSAGE (OUTPATIENT)
Dept: HEMATOLOGY/ONCOLOGY | Facility: CLINIC | Age: 53
End: 2023-09-25
Payer: COMMERCIAL

## 2023-09-26 NOTE — PROGRESS NOTES
[facility]/ [facility]      Chief Complaint:   TNBC       Cancer Staging   Invasive ductal carcinoma of breast  Staging form: Breast, AJCC 8th Edition  - Clinical stage from 2023: Stage IB (cT1c, cN0, cM0, G3, ER-, WI-, HER2-) - Signed by Sonia Johnson MD on 2023      HPI:  Teresa Valdes is a 53 y.o. female who presents today for evaluation of newly diagnosed breast cancer. Her oncologic history is as follows:    -screening MMG 23 showing an asymmetry in the central L breast. Diagnostic MMG/US revealing a 19 x 12 x17mm complex cystic and solid mass seen in the L breast at 11oclock. No left axillary LAD   -23 biopsy confirming IDC, grade 3. ER negative, WI negative, Her2 2+, negative FISH. Ki67 80%  -23 MRI breast showed 1.6 x 1.0 x 1.2 cm left breast mass with no associated lymphadenopathy.  -na dd AC-T started 23    Gyn History:   Menarche: 14  Menopause: 46    Age at first pregnancy: 18  : Yes, with second child    Interval History:  Teresa presents today for C3D1 naddAC-T. Overall doing fairly well, although notes more difficulty with cycle 2 than cycle 1. Has had difficulty with nausea; is taking zofran BID with some relief. Appetite has been decreased. She also reported back pain one week following her infusion which has since resolved with tylenol.           Patient Active Problem List   Diagnosis    Encounter for colorectal cancer screening    Anxiety and depression    Overweight (BMI 25.0-29.9)    Need for hepatitis C screening test    Need for shingles vaccine    Bunion of great toe of left foot    Laceration of skin of forehead    Invasive ductal carcinoma of breast    Malignant neoplasm of upper-inner quadrant of left breast in female, estrogen receptor negative       Current Outpatient Medications   Medication Instructions    dexAMETHasone (DECADRON) 4 MG Tab Take 8mg (2 pills) po daily on  "days 2-4 of chemotherapy. Only for cycles 1-4    ergocalciferol (ERGOCALCIFEROL) 50,000 Units, Oral, Every 7 days    LIDOcaine-prilocaine (EMLA) cream Topical (Top), As needed (PRN)    multivitamin capsule 1 capsule, Oral, Daily    OLANZapine (ZYPREXA) 5 MG tablet Take 5mg nightly on days 1-4 of chemotherapy    ondansetron (ZOFRAN) 8 mg, Oral, Every 12 hours PRN    venlafaxine (EFFEXOR) 37.5 mg, Oral, Daily     Review of Systems:   +nausea  +diarrhea  +back pain  12pt ROS negative except as noted above      PHYSICAL EXAM:  /87   Pulse 64   Temp 98.1 °F (36.7 °C) (Oral)   Resp 18   Ht 5' 6" (1.676 m)   Wt 80.3 kg (177 lb 0.5 oz)   LMP 10/15/2018 (Within Weeks)   SpO2 98%   BMI 28.57 kg/m²     ECOG 0  General: well appearing, in no apparent distress  HEENT: Normocephalic, EOMI, anicteric sclerae, MMM  Neck: supple, without cervical or supraclavicular lymphadenopathy.  Heart: regular rate and rhythm, normal S1 and S2, no murmurs, gallops or rubs.  Lungs: Clear to auscultation bilaterally, no increased wob  Breast: difficult to appreciate previously palpable mass in UIQ of L breast.   Abdomen: Soft, nontender, nondistended with normal bowel sounds. No hepatosplenomegaly.  Extremities: No LE edema or joint effusion  Skin: warm, well-perfused, no rash  Neurologic: Alert and oriented x 4, normal speech and gait   Psychiatric: Conversing appropriately with providers throughout today's encounter.       Reviewed all recent labs, imaging and pathology.    Assessment & Plan:  Teresa is a dimitry 52yo woman with recently diagnosed cT1cN0 TNBC.     We previously reviewed the natural history of TNBC; given disease <2cm would favor proceeding with naddAC-T. Previously discussed dosing, logistic, supportive medications and potential side effects. Continues to tolerate treatment well thus far.     #TNBC:  --labs reviewed and ok to proceed with C3D1 as scheduled   --echo 8/10/23 showing EF 60-65%; will repeat following " completion of doxorubicin  --RTC in 2 weeks as scheduled      #Nausea:  --will order trial of compazine to see if this works better for her. If not,will cont zofran and counseled to use TID       #Back pain: MSK  in nature, likely related to chemo  --cont tylenol as needed. Can also alternate NSAID if needed      #Tobacco use disorder:  --1/2 ppd x 20 years; working to cut down on her own- now down to 2 cigarettes qod        All questions were answered to her apparent satisfaction. Will see her back in 2 weeks or sooner should the need arise.     Sonia Johnson MD    Total time of this visit, including time spent face to face with patient and/or via video/audio, and also in preparing for today's visit for MDM and documentation. (Medical Decision Making, including consideration of possible diagnoses, management options, complex medical record review, review of diagnostic tests and information, consideration and discussion of significant complications based on comorbidities, and discussion with providers involved with the care of the patient) 40 minutes. Greater than 50% was spent face to face with the patient counseling and coordinating care.         Route Chart for Scheduling    Med Onc Chart Routing      Follow up with physician 2 weeks. scheduled   Follow up with DARIANA    Infusion scheduling note   cont qw weeks as scheduled, then weekly   Injection scheduling note cont as scheduled   Labs CBC and CMP   Scheduling:  Preferred lab:  Lab interval: every 2 weeks     Imaging None      Pharmacy appointment No pharmacy appointment needed      Other referrals no referral to Oncology Primary Care needed -  no Massage appointment needed    No additional referrals needed               Treatment Plan Information   OP BREAST DOSE-DENSE AC-T (DOXORUBICIN CYCLOPHOSPHAMIDE Q2W FOLLOWED BY PACLITAXEL WEEKLY)   Sonia Johnson MD   Upcoming Treatment Dates - OP BREAST DOSE-DENSE AC-T (DOXORUBICIN CYCLOPHOSPHAMIDE Q2W FOLLOWED BY  PACLITAXEL WEEKLY)    9/28/2023       Chemotherapy       DOXOrubicin chemo injection 112 mg       cycloPHOSphamide 600 mg/m2 = 1,120 mg in sodium chloride 0.9% 255.6 mL chemo infusion       Antiemetics       aprepitant (CINVANTI) injection 130 mg       palonosetron 0.25mg/dexAMETHasone 12mg in NS IVPB 0.25 mg 50 mL       prochlorperazine injection Soln 5 mg  10/12/2023       Chemotherapy       DOXOrubicin chemo injection 112 mg       cycloPHOSphamide 600 mg/m2 = 1,120 mg in sodium chloride 0.9% 255.6 mL chemo infusion       Antiemetics       aprepitant (CINVANTI) injection 130 mg       palonosetron 0.25mg/dexAMETHasone 12mg in NS IVPB 0.25 mg 50 mL       prochlorperazine injection Soln 5 mg  10/26/2023       Pre-Medications       diphenhydrAMINE (BENADRYL) 50 mg in NS 50 mL IVPB       dexAMETHasone (DECADRON) 10 mg in sodium chloride 0.9% 50 mL IVPB       famotidine (PF) injection 20 mg       Chemotherapy       PACLitaxeL (TAXOL) 80 mg/m2 = 150 mg in sodium chloride 0.9% 250 mL chemo infusion       Antiemetics       prochlorperazine injection Soln 5 mg  11/2/2023       Pre-Medications       diphenhydrAMINE (BENADRYL) 50 mg in NS 50 mL IVPB       dexAMETHasone (DECADRON) 10 mg in sodium chloride 0.9% 50 mL IVPB       famotidine (PF) injection 20 mg       Chemotherapy       PACLitaxeL (TAXOL) 80 mg/m2 = 150 mg in sodium chloride 0.9% 250 mL chemo infusion       Antiemetics       prochlorperazine injection Soln 5 mg    Supportive Plan Information  OP PEGFILGRASTIM   Moraima Alvarado NP   Upcoming Treatment Dates - OP PEGFILGRASTIM    No upcoming days in selected categories.

## 2023-09-28 ENCOUNTER — OFFICE VISIT (OUTPATIENT)
Dept: HEMATOLOGY/ONCOLOGY | Facility: CLINIC | Age: 53
End: 2023-09-28
Payer: COMMERCIAL

## 2023-09-28 ENCOUNTER — INFUSION (OUTPATIENT)
Dept: INFUSION THERAPY | Facility: HOSPITAL | Age: 53
End: 2023-09-28
Payer: COMMERCIAL

## 2023-09-28 VITALS
HEIGHT: 66 IN | TEMPERATURE: 98 F | WEIGHT: 177 LBS | SYSTOLIC BLOOD PRESSURE: 147 MMHG | RESPIRATION RATE: 18 BRPM | HEART RATE: 68 BPM | BODY MASS INDEX: 28.45 KG/M2 | DIASTOLIC BLOOD PRESSURE: 82 MMHG

## 2023-09-28 VITALS
SYSTOLIC BLOOD PRESSURE: 135 MMHG | TEMPERATURE: 98 F | HEIGHT: 66 IN | RESPIRATION RATE: 18 BRPM | OXYGEN SATURATION: 98 % | HEART RATE: 64 BPM | WEIGHT: 177 LBS | DIASTOLIC BLOOD PRESSURE: 87 MMHG | BODY MASS INDEX: 28.45 KG/M2

## 2023-09-28 DIAGNOSIS — M79.10 MYALGIA: ICD-10-CM

## 2023-09-28 DIAGNOSIS — R11.0 CHEMOTHERAPY-INDUCED NAUSEA: ICD-10-CM

## 2023-09-28 DIAGNOSIS — C50.912 INFILTRATING DUCTAL CARCINOMA OF LEFT BREAST: Primary | ICD-10-CM

## 2023-09-28 DIAGNOSIS — F17.200 TOBACCO USE DISORDER: ICD-10-CM

## 2023-09-28 DIAGNOSIS — T45.1X5A CHEMOTHERAPY-INDUCED NAUSEA: ICD-10-CM

## 2023-09-28 PROCEDURE — 96375 TX/PRO/DX INJ NEW DRUG ADDON: CPT

## 2023-09-28 PROCEDURE — 3079F DIAST BP 80-89 MM HG: CPT | Mod: CPTII,S$GLB,, | Performed by: STUDENT IN AN ORGANIZED HEALTH CARE EDUCATION/TRAINING PROGRAM

## 2023-09-28 PROCEDURE — 99999 PR PBB SHADOW E&M-EST. PATIENT-LVL III: CPT | Mod: PBBFAC,,, | Performed by: STUDENT IN AN ORGANIZED HEALTH CARE EDUCATION/TRAINING PROGRAM

## 2023-09-28 PROCEDURE — 3008F BODY MASS INDEX DOCD: CPT | Mod: CPTII,S$GLB,, | Performed by: STUDENT IN AN ORGANIZED HEALTH CARE EDUCATION/TRAINING PROGRAM

## 2023-09-28 PROCEDURE — 63600175 PHARM REV CODE 636 W HCPCS: Performed by: STUDENT IN AN ORGANIZED HEALTH CARE EDUCATION/TRAINING PROGRAM

## 2023-09-28 PROCEDURE — 1159F MED LIST DOCD IN RCRD: CPT | Mod: CPTII,S$GLB,, | Performed by: STUDENT IN AN ORGANIZED HEALTH CARE EDUCATION/TRAINING PROGRAM

## 2023-09-28 PROCEDURE — 3044F HG A1C LEVEL LT 7.0%: CPT | Mod: CPTII,S$GLB,, | Performed by: STUDENT IN AN ORGANIZED HEALTH CARE EDUCATION/TRAINING PROGRAM

## 2023-09-28 PROCEDURE — 96413 CHEMO IV INFUSION 1 HR: CPT

## 2023-09-28 PROCEDURE — 99215 PR OFFICE/OUTPT VISIT, EST, LEVL V, 40-54 MIN: ICD-10-PCS | Mod: S$GLB,,, | Performed by: STUDENT IN AN ORGANIZED HEALTH CARE EDUCATION/TRAINING PROGRAM

## 2023-09-28 PROCEDURE — 25000003 PHARM REV CODE 250: Performed by: STUDENT IN AN ORGANIZED HEALTH CARE EDUCATION/TRAINING PROGRAM

## 2023-09-28 PROCEDURE — 99215 OFFICE O/P EST HI 40 MIN: CPT | Mod: S$GLB,,, | Performed by: STUDENT IN AN ORGANIZED HEALTH CARE EDUCATION/TRAINING PROGRAM

## 2023-09-28 PROCEDURE — 3079F PR MOST RECENT DIASTOLIC BLOOD PRESSURE 80-89 MM HG: ICD-10-PCS | Mod: CPTII,S$GLB,, | Performed by: STUDENT IN AN ORGANIZED HEALTH CARE EDUCATION/TRAINING PROGRAM

## 2023-09-28 PROCEDURE — 3075F SYST BP GE 130 - 139MM HG: CPT | Mod: CPTII,S$GLB,, | Performed by: STUDENT IN AN ORGANIZED HEALTH CARE EDUCATION/TRAINING PROGRAM

## 2023-09-28 PROCEDURE — 96367 TX/PROPH/DG ADDL SEQ IV INF: CPT

## 2023-09-28 PROCEDURE — 3008F PR BODY MASS INDEX (BMI) DOCUMENTED: ICD-10-PCS | Mod: CPTII,S$GLB,, | Performed by: STUDENT IN AN ORGANIZED HEALTH CARE EDUCATION/TRAINING PROGRAM

## 2023-09-28 PROCEDURE — 96411 CHEMO IV PUSH ADDL DRUG: CPT

## 2023-09-28 PROCEDURE — A4216 STERILE WATER/SALINE, 10 ML: HCPCS | Performed by: STUDENT IN AN ORGANIZED HEALTH CARE EDUCATION/TRAINING PROGRAM

## 2023-09-28 PROCEDURE — 1159F PR MEDICATION LIST DOCUMENTED IN MEDICAL RECORD: ICD-10-PCS | Mod: CPTII,S$GLB,, | Performed by: STUDENT IN AN ORGANIZED HEALTH CARE EDUCATION/TRAINING PROGRAM

## 2023-09-28 PROCEDURE — 3075F PR MOST RECENT SYSTOLIC BLOOD PRESS GE 130-139MM HG: ICD-10-PCS | Mod: CPTII,S$GLB,, | Performed by: STUDENT IN AN ORGANIZED HEALTH CARE EDUCATION/TRAINING PROGRAM

## 2023-09-28 PROCEDURE — 99999 PR PBB SHADOW E&M-EST. PATIENT-LVL III: ICD-10-PCS | Mod: PBBFAC,,, | Performed by: STUDENT IN AN ORGANIZED HEALTH CARE EDUCATION/TRAINING PROGRAM

## 2023-09-28 PROCEDURE — 3044F PR MOST RECENT HEMOGLOBIN A1C LEVEL <7.0%: ICD-10-PCS | Mod: CPTII,S$GLB,, | Performed by: STUDENT IN AN ORGANIZED HEALTH CARE EDUCATION/TRAINING PROGRAM

## 2023-09-28 RX ORDER — SODIUM CHLORIDE 0.9 % (FLUSH) 0.9 %
10 SYRINGE (ML) INJECTION
Status: DISCONTINUED | OUTPATIENT
Start: 2023-09-28 | End: 2023-09-28 | Stop reason: HOSPADM

## 2023-09-28 RX ORDER — PROCHLORPERAZINE EDISYLATE 5 MG/ML
5 INJECTION INTRAMUSCULAR; INTRAVENOUS ONCE AS NEEDED
Status: CANCELLED
Start: 2023-09-28

## 2023-09-28 RX ORDER — DOXORUBICIN HYDROCHLORIDE 2 MG/ML
60 INJECTION, SOLUTION INTRAVENOUS
Status: CANCELLED | OUTPATIENT
Start: 2023-09-28

## 2023-09-28 RX ORDER — HEPARIN 100 UNIT/ML
500 SYRINGE INTRAVENOUS
Status: DISCONTINUED | OUTPATIENT
Start: 2023-09-28 | End: 2023-09-28 | Stop reason: HOSPADM

## 2023-09-28 RX ORDER — DOXORUBICIN HYDROCHLORIDE 2 MG/ML
60 INJECTION, SOLUTION INTRAVENOUS
Status: COMPLETED | OUTPATIENT
Start: 2023-09-28 | End: 2023-09-28

## 2023-09-28 RX ORDER — PROCHLORPERAZINE EDISYLATE 5 MG/ML
5 INJECTION INTRAMUSCULAR; INTRAVENOUS ONCE AS NEEDED
Status: DISCONTINUED | OUTPATIENT
Start: 2023-09-28 | End: 2023-09-28 | Stop reason: HOSPADM

## 2023-09-28 RX ORDER — HEPARIN 100 UNIT/ML
500 SYRINGE INTRAVENOUS
Status: CANCELLED | OUTPATIENT
Start: 2023-09-28

## 2023-09-28 RX ORDER — SODIUM CHLORIDE 0.9 % (FLUSH) 0.9 %
10 SYRINGE (ML) INJECTION
Status: CANCELLED | OUTPATIENT
Start: 2023-09-28

## 2023-09-28 RX ADMIN — APREPITANT 130 MG: 130 INJECTION, EMULSION INTRAVENOUS at 11:09

## 2023-09-28 RX ADMIN — CYCLOPHOSPHAMIDE 1120 MG: 200 INJECTION, SOLUTION INTRAVENOUS at 11:09

## 2023-09-28 RX ADMIN — DOXORUBICIN HYDROCHLORIDE 112 MG: 2 INJECTION, SOLUTION INTRAVENOUS at 11:09

## 2023-09-28 RX ADMIN — Medication 10 ML: at 12:09

## 2023-09-28 RX ADMIN — HEPARIN 500 UNITS: 100 SYRINGE at 12:09

## 2023-09-28 RX ADMIN — DEXAMETHASONE SODIUM PHOSPHATE 0.25 MG: 4 INJECTION, SOLUTION INTRA-ARTICULAR; INTRALESIONAL; INTRAMUSCULAR; INTRAVENOUS; SOFT TISSUE at 10:09

## 2023-09-28 NOTE — PROGRESS NOTES
[facility]/ [facility]      Chief Complaint:   TNBC       Cancer Staging   Invasive ductal carcinoma of breast  Staging form: Breast, AJCC 8th Edition  - Clinical stage from 2023: Stage IB (cT1c, cN0, cM0, G3, ER-, CT-, HER2-) - Signed by Sonia Johnson MD on 2023      HPI:  Teresa Valdes is a 53 y.o. female who presents today for evaluation of newly diagnosed breast cancer. Her oncologic history is as follows:    -screening MMG 23 showing an asymmetry in the central L breast. Diagnostic MMG/US revealing a 19 x 12 x17mm complex cystic and solid mass seen in the L breast at 11oclock. No left axillary LAD   -23 biopsy confirming IDC, grade 3. ER negative, CT negative, Her2 2+, negative FISH. Ki67 80%  -23 MRI breast showed 1.6 x 1.0 x 1.2 cm left breast mass with no associated lymphadenopathy.    Gyn History:   Menarche: 14  Menopause: 46    Age at first pregnancy: 18  : Yes, with second child    Interval History:  Teresa presents today for C4D1 naddAC-T. Notes she did have some sever nausea okay with zofran, but the last few days have been better.   Taste changes has been causing trouble eating. Sleeping well at night.   Back pain has been okay, much better than the last time.  Appetite is decreased. She has been gargling with backing soda, she does have mouth sores.   She does have diarrhea has not been taking any medication for this.    Alopecia.   Denies bleeding, nose, urine and stool.  She is fatigued and has some shortness of breath with exertion.   Has been doing very well with smoking cessation- almost 100% quit      Patient Active Problem List   Diagnosis    Encounter for colorectal cancer screening    Anxiety and depression    Overweight (BMI 25.0-29.9)    Need for hepatitis C screening test    Need for shingles vaccine    Bunion of great toe of left foot    Laceration of skin of forehead     Invasive ductal carcinoma of breast    Malignant neoplasm of upper-inner quadrant of left breast in female, estrogen receptor negative       Current Outpatient Medications   Medication Instructions    dexAMETHasone (DECADRON) 4 MG Tab Take 8mg (2 pills) po daily on days 2-4 of chemotherapy. Only for cycles 1-4    ergocalciferol (ERGOCALCIFEROL) 50,000 Units, Oral, Every 7 days    LIDOcaine-prilocaine (EMLA) cream Topical (Top), As needed (PRN)    multivitamin capsule 1 capsule, Oral, Daily    OLANZapine (ZYPREXA) 5 MG tablet Take 5mg nightly on days 1-4 of chemotherapy    ondansetron (ZOFRAN) 8 mg, Oral, Every 12 hours PRN    venlafaxine (EFFEXOR) 37.5 mg, Oral, Daily     Review of Systems:   See above      PHYSICAL EXAM:  LMP 10/15/2018 (Within Weeks)     ECOG 0  General: well appearing, in no apparent distress  HEENT: Normocephalic, EOMI, anicteric sclerae, MMM  Neck: supple, without cervical or supraclavicular lymphadenopathy.  Heart: regular rate and rhythm, normal S1 and S2, no murmurs, gallops or rubs.  Lungs: Clear to auscultation bilaterally, no increased wob  Breast: Left upper breast mass palpated Upper inner quadrant, 1.5 cm moveable mass, non-tender to palpation, non-mobile  Abdomen: Soft, nontender, nondistended with normal bowel sounds. No hepatosplenomegaly.  Extremities: No LE edema or joint effusion  Skin: warm, well-perfused, no rash  Neurologic: Alert and oriented x 4, normal speech and gait   Psychiatric: Conversing appropriately with providers throughout today's encounter.       Reviewed all recent labs, imaging and pathology.    Assessment & Plan:  Teresa is a dimitry 54yo woman with recently diagnosed cT1cN0 TNBC.     We previously reviewed the natural history of TNBC; given disease <2cm would favor proceeding with naddAC-T. Previously discussed dosing, logistic, supportive medications and potential side effects.     #TNBC:  --labs reviewed and ok to proceed with C2D1 as scheduled   --echo  8/10/23 showing EF 60-65%; will repeat following completion of doxorubicin  --RTC in 2 weeks as scheduled       #Tobacco use disorder:  --1/2 ppd x 20 years; working to cut down on her own- now down to 2 cigarettes qod    -- goal is to be 100% quit by 10/29       All questions were answered to her apparent satisfaction. Will see her back in 2 weeks or sooner should the need arise.     Return to clinic in 2 weeks with MD appointment and labs.     Patient is in agreement with the proposed treatment plan. All questions were answered to the patient's satisfaction. Patient knows to call clinic for any new or worsening symptoms and if anything is needed before the next clinic visit.          Moraima Alvarado, TREVORP-C  Hematology & Medical Oncology   [address] [place] [id number]  ph. [phone]  Fax. [phone]    Collaborating physician, Dr. Salazar.    Approximately 15 minutes were spent face-to-face with the patient.  Approximately 25 minutes in total were spent on this encounter, which includes face-to-face time and non-face-to-face time preparing to see the patient (e.g., review of tests), obtaining and/or reviewing separately obtained history, documenting clinical information in the electronic or other health record, independently interpreting results (not separately reported) and communicating results to the patient/family/caregiver, or care coordination (not separately reported).        Route Chart for Scheduling    Med Onc Chart Routing      Follow up with physician 2 weeks. already scheduled   Follow up with DARIANA    Infusion scheduling note    Injection scheduling note    Labs    Imaging    Pharmacy appointment    Other referrals                  Treatment Plan Information   OP BREAST DOSE-DENSE AC-T (DOXORUBICIN CYCLOPHOSPHAMIDE Q2W FOLLOWED BY PACLITAXEL WEEKLY)   Sonia Johnson MD   Upcoming Treatment Dates - OP BREAST DOSE-DENSE AC-T (DOXORUBICIN CYCLOPHOSPHAMIDE Q2W FOLLOWED BY PACLITAXEL  WEEKLY)    9/28/2023       Chemotherapy       DOXOrubicin chemo injection 112 mg       cycloPHOSphamide 600 mg/m2 = 1,120 mg in sodium chloride 0.9% 255.6 mL chemo infusion       Antiemetics       aprepitant (CINVANTI) injection 130 mg       palonosetron 0.25mg/dexAMETHasone 12mg in NS IVPB 0.25 mg 50 mL       prochlorperazine injection Soln 5 mg  10/12/2023       Chemotherapy       DOXOrubicin chemo injection 112 mg       cycloPHOSphamide 600 mg/m2 = 1,120 mg in sodium chloride 0.9% 255.6 mL chemo infusion       Antiemetics       aprepitant (CINVANTI) injection 130 mg       palonosetron 0.25mg/dexAMETHasone 12mg in NS IVPB 0.25 mg 50 mL       prochlorperazine injection Soln 5 mg  10/26/2023       Pre-Medications       diphenhydrAMINE (BENADRYL) 50 mg in NS 50 mL IVPB       dexAMETHasone (DECADRON) 10 mg in sodium chloride 0.9% 50 mL IVPB       famotidine (PF) injection 20 mg       Chemotherapy       PACLitaxeL (TAXOL) 80 mg/m2 = 150 mg in sodium chloride 0.9% 250 mL chemo infusion       Antiemetics       prochlorperazine injection Soln 5 mg  11/2/2023       Pre-Medications       diphenhydrAMINE (BENADRYL) 50 mg in NS 50 mL IVPB       dexAMETHasone (DECADRON) 10 mg in sodium chloride 0.9% 50 mL IVPB       famotidine (PF) injection 20 mg       Chemotherapy       PACLitaxeL (TAXOL) 80 mg/m2 = 150 mg in sodium chloride 0.9% 250 mL chemo infusion       Antiemetics       prochlorperazine injection Soln 5 mg    Supportive Plan Information  OP PEGFILGRASTIM   Moraima Alvarado NP   Upcoming Treatment Dates - OP PEGFILGRASTIM    No upcoming days in selected categories.

## 2023-10-03 ENCOUNTER — PATIENT MESSAGE (OUTPATIENT)
Dept: HEMATOLOGY/ONCOLOGY | Facility: CLINIC | Age: 53
End: 2023-10-03
Payer: COMMERCIAL

## 2023-10-04 ENCOUNTER — PATIENT MESSAGE (OUTPATIENT)
Dept: HEMATOLOGY/ONCOLOGY | Facility: CLINIC | Age: 53
End: 2023-10-04
Payer: COMMERCIAL

## 2023-10-12 ENCOUNTER — PATIENT MESSAGE (OUTPATIENT)
Dept: HEMATOLOGY/ONCOLOGY | Facility: CLINIC | Age: 53
End: 2023-10-12

## 2023-10-12 ENCOUNTER — OFFICE VISIT (OUTPATIENT)
Dept: HEMATOLOGY/ONCOLOGY | Facility: CLINIC | Age: 53
End: 2023-10-12
Payer: COMMERCIAL

## 2023-10-12 ENCOUNTER — TELEPHONE (OUTPATIENT)
Dept: HEMATOLOGY/ONCOLOGY | Facility: CLINIC | Age: 53
End: 2023-10-12
Payer: COMMERCIAL

## 2023-10-12 ENCOUNTER — INFUSION (OUTPATIENT)
Dept: INFUSION THERAPY | Facility: HOSPITAL | Age: 53
End: 2023-10-12
Payer: COMMERCIAL

## 2023-10-12 VITALS
OXYGEN SATURATION: 99 % | SYSTOLIC BLOOD PRESSURE: 122 MMHG | BODY MASS INDEX: 28.08 KG/M2 | RESPIRATION RATE: 18 BRPM | HEIGHT: 66 IN | HEART RATE: 88 BPM | DIASTOLIC BLOOD PRESSURE: 77 MMHG | WEIGHT: 174.69 LBS

## 2023-10-12 VITALS
RESPIRATION RATE: 18 BRPM | HEIGHT: 66 IN | HEART RATE: 78 BPM | BODY MASS INDEX: 28.08 KG/M2 | SYSTOLIC BLOOD PRESSURE: 128 MMHG | TEMPERATURE: 98 F | DIASTOLIC BLOOD PRESSURE: 70 MMHG | WEIGHT: 174.69 LBS | OXYGEN SATURATION: 99 %

## 2023-10-12 DIAGNOSIS — K12.31 ORAL MUCOSITIS (ULCERATIVE) DUE TO ANTINEOPLASTIC THERAPY: ICD-10-CM

## 2023-10-12 DIAGNOSIS — Z17.1 MALIGNANT NEOPLASM OF UPPER-INNER QUADRANT OF LEFT BREAST IN FEMALE, ESTROGEN RECEPTOR NEGATIVE: ICD-10-CM

## 2023-10-12 DIAGNOSIS — C50.912 INFILTRATING DUCTAL CARCINOMA OF LEFT BREAST: Primary | ICD-10-CM

## 2023-10-12 DIAGNOSIS — C50.212 MALIGNANT NEOPLASM OF UPPER-INNER QUADRANT OF LEFT BREAST IN FEMALE, ESTROGEN RECEPTOR NEGATIVE: ICD-10-CM

## 2023-10-12 PROCEDURE — 99215 PR OFFICE/OUTPT VISIT, EST, LEVL V, 40-54 MIN: ICD-10-PCS | Mod: S$GLB,,, | Performed by: NURSE PRACTITIONER

## 2023-10-12 PROCEDURE — 1159F PR MEDICATION LIST DOCUMENTED IN MEDICAL RECORD: ICD-10-PCS | Mod: CPTII,S$GLB,, | Performed by: NURSE PRACTITIONER

## 2023-10-12 PROCEDURE — 96413 CHEMO IV INFUSION 1 HR: CPT

## 2023-10-12 PROCEDURE — 99999 PR PBB SHADOW E&M-EST. PATIENT-LVL IV: ICD-10-PCS | Mod: PBBFAC,,, | Performed by: NURSE PRACTITIONER

## 2023-10-12 PROCEDURE — 1160F PR REVIEW ALL MEDS BY PRESCRIBER/CLIN PHARMACIST DOCUMENTED: ICD-10-PCS | Mod: CPTII,S$GLB,, | Performed by: NURSE PRACTITIONER

## 2023-10-12 PROCEDURE — 3044F PR MOST RECENT HEMOGLOBIN A1C LEVEL <7.0%: ICD-10-PCS | Mod: CPTII,S$GLB,, | Performed by: NURSE PRACTITIONER

## 2023-10-12 PROCEDURE — 96367 TX/PROPH/DG ADDL SEQ IV INF: CPT

## 2023-10-12 PROCEDURE — 1160F RVW MEDS BY RX/DR IN RCRD: CPT | Mod: CPTII,S$GLB,, | Performed by: NURSE PRACTITIONER

## 2023-10-12 PROCEDURE — 3044F HG A1C LEVEL LT 7.0%: CPT | Mod: CPTII,S$GLB,, | Performed by: NURSE PRACTITIONER

## 2023-10-12 PROCEDURE — 96375 TX/PRO/DX INJ NEW DRUG ADDON: CPT

## 2023-10-12 PROCEDURE — 99215 OFFICE O/P EST HI 40 MIN: CPT | Mod: S$GLB,,, | Performed by: NURSE PRACTITIONER

## 2023-10-12 PROCEDURE — 99999 PR PBB SHADOW E&M-EST. PATIENT-LVL IV: CPT | Mod: PBBFAC,,, | Performed by: NURSE PRACTITIONER

## 2023-10-12 PROCEDURE — 3078F DIAST BP <80 MM HG: CPT | Mod: CPTII,S$GLB,, | Performed by: NURSE PRACTITIONER

## 2023-10-12 PROCEDURE — 3008F PR BODY MASS INDEX (BMI) DOCUMENTED: ICD-10-PCS | Mod: CPTII,S$GLB,, | Performed by: NURSE PRACTITIONER

## 2023-10-12 PROCEDURE — 3074F SYST BP LT 130 MM HG: CPT | Mod: CPTII,S$GLB,, | Performed by: NURSE PRACTITIONER

## 2023-10-12 PROCEDURE — 3078F PR MOST RECENT DIASTOLIC BLOOD PRESSURE < 80 MM HG: ICD-10-PCS | Mod: CPTII,S$GLB,, | Performed by: NURSE PRACTITIONER

## 2023-10-12 PROCEDURE — 1159F MED LIST DOCD IN RCRD: CPT | Mod: CPTII,S$GLB,, | Performed by: NURSE PRACTITIONER

## 2023-10-12 PROCEDURE — 96411 CHEMO IV PUSH ADDL DRUG: CPT

## 2023-10-12 PROCEDURE — 3008F BODY MASS INDEX DOCD: CPT | Mod: CPTII,S$GLB,, | Performed by: NURSE PRACTITIONER

## 2023-10-12 PROCEDURE — 3074F PR MOST RECENT SYSTOLIC BLOOD PRESSURE < 130 MM HG: ICD-10-PCS | Mod: CPTII,S$GLB,, | Performed by: NURSE PRACTITIONER

## 2023-10-12 PROCEDURE — 25000003 PHARM REV CODE 250: Performed by: NURSE PRACTITIONER

## 2023-10-12 PROCEDURE — 63600175 PHARM REV CODE 636 W HCPCS: Mod: JG | Performed by: NURSE PRACTITIONER

## 2023-10-12 RX ORDER — DOXORUBICIN HYDROCHLORIDE 2 MG/ML
60 INJECTION, SOLUTION INTRAVENOUS
Status: CANCELLED | OUTPATIENT
Start: 2023-10-12

## 2023-10-12 RX ORDER — PROCHLORPERAZINE EDISYLATE 5 MG/ML
5 INJECTION INTRAMUSCULAR; INTRAVENOUS ONCE AS NEEDED
Status: COMPLETED | OUTPATIENT
Start: 2023-10-12 | End: 2023-10-12

## 2023-10-12 RX ORDER — PROCHLORPERAZINE EDISYLATE 5 MG/ML
5 INJECTION INTRAMUSCULAR; INTRAVENOUS ONCE AS NEEDED
Status: CANCELLED
Start: 2023-10-12

## 2023-10-12 RX ORDER — HEPARIN 100 UNIT/ML
500 SYRINGE INTRAVENOUS
Status: DISCONTINUED | OUTPATIENT
Start: 2023-10-12 | End: 2023-10-12 | Stop reason: HOSPADM

## 2023-10-12 RX ORDER — DOXORUBICIN HYDROCHLORIDE 2 MG/ML
60 INJECTION, SOLUTION INTRAVENOUS
Status: COMPLETED | OUTPATIENT
Start: 2023-10-12 | End: 2023-10-12

## 2023-10-12 RX ORDER — HEPARIN 100 UNIT/ML
500 SYRINGE INTRAVENOUS
Status: CANCELLED | OUTPATIENT
Start: 2023-10-12

## 2023-10-12 RX ORDER — PROMETHAZINE HYDROCHLORIDE 12.5 MG/1
12.5 TABLET ORAL EVERY 4 HOURS PRN
Qty: 30 TABLET | Refills: 2 | Status: SHIPPED | OUTPATIENT
Start: 2023-10-12 | End: 2023-11-16 | Stop reason: SDUPTHER

## 2023-10-12 RX ORDER — SODIUM CHLORIDE 0.9 % (FLUSH) 0.9 %
10 SYRINGE (ML) INJECTION
Status: DISCONTINUED | OUTPATIENT
Start: 2023-10-12 | End: 2023-10-12 | Stop reason: HOSPADM

## 2023-10-12 RX ORDER — SODIUM CHLORIDE 0.9 % (FLUSH) 0.9 %
10 SYRINGE (ML) INJECTION
Status: CANCELLED | OUTPATIENT
Start: 2023-10-12

## 2023-10-12 RX ADMIN — DOXORUBICIN HYDROCHLORIDE 112 MG: 2 INJECTION, SOLUTION INTRAVENOUS at 10:10

## 2023-10-12 RX ADMIN — APREPITANT 130 MG: 130 INJECTION, EMULSION INTRAVENOUS at 09:10

## 2023-10-12 RX ADMIN — SODIUM CHLORIDE: 9 INJECTION, SOLUTION INTRAVENOUS at 09:10

## 2023-10-12 RX ADMIN — HEPARIN 500 UNITS: 100 SYRINGE at 11:10

## 2023-10-12 RX ADMIN — CYCLOPHOSPHAMIDE 1120 MG: 200 INJECTION, SOLUTION INTRAVENOUS at 10:10

## 2023-10-12 RX ADMIN — PROCHLORPERAZINE EDISYLATE 5 MG: 5 INJECTION INTRAMUSCULAR; INTRAVENOUS at 10:10

## 2023-10-12 RX ADMIN — DEXAMETHASONE SODIUM PHOSPHATE 0.25 MG: 4 INJECTION, SOLUTION INTRA-ARTICULAR; INTRALESIONAL; INTRAMUSCULAR; INTRAVENOUS; SOFT TISSUE at 09:10

## 2023-10-12 NOTE — PLAN OF CARE
Pt admitted for C4 Doxorubicin/Cyclophosphamide. Labs reviewed and assessment performed, ongoing fatigue, nausea addressed. Pt tolerated treatment well. Port de-accessed and Pt discharged home

## 2023-10-20 ENCOUNTER — PATIENT MESSAGE (OUTPATIENT)
Dept: HEMATOLOGY/ONCOLOGY | Facility: CLINIC | Age: 53
End: 2023-10-20
Payer: COMMERCIAL

## 2023-10-24 DIAGNOSIS — K12.31 ORAL MUCOSITIS (ULCERATIVE) DUE TO ANTINEOPLASTIC THERAPY: ICD-10-CM

## 2023-10-24 DIAGNOSIS — K64.5 THROMBOSED HEMORRHOIDS: Primary | ICD-10-CM

## 2023-10-24 NOTE — PROGRESS NOTES
Spoke to Ms. Valdes this afternoon. She has noticed large external hemorrhoid- has had minimal bleeding but significant pain. Is trying preparation H, anusol, soaks and various medicated wipes with minimal relief. She is now having more diarrhea than constipation.; encouraged her to use stool bulking agent like fiber for softer formed bowel movements. Referral placed to colorectal team for evaluation.     Sonia Johnson MD

## 2023-10-26 ENCOUNTER — OFFICE VISIT (OUTPATIENT)
Dept: HEMATOLOGY/ONCOLOGY | Facility: CLINIC | Age: 53
End: 2023-10-26
Payer: COMMERCIAL

## 2023-10-26 ENCOUNTER — PATIENT MESSAGE (OUTPATIENT)
Dept: HEMATOLOGY/ONCOLOGY | Facility: CLINIC | Age: 53
End: 2023-10-26

## 2023-10-26 ENCOUNTER — INFUSION (OUTPATIENT)
Dept: INFUSION THERAPY | Facility: HOSPITAL | Age: 53
End: 2023-10-26
Payer: COMMERCIAL

## 2023-10-26 VITALS — HEART RATE: 77 BPM | SYSTOLIC BLOOD PRESSURE: 123 MMHG | DIASTOLIC BLOOD PRESSURE: 73 MMHG

## 2023-10-26 VITALS
BODY MASS INDEX: 27.14 KG/M2 | WEIGHT: 168.88 LBS | TEMPERATURE: 98 F | HEART RATE: 105 BPM | SYSTOLIC BLOOD PRESSURE: 130 MMHG | OXYGEN SATURATION: 98 % | HEIGHT: 66 IN | DIASTOLIC BLOOD PRESSURE: 87 MMHG | RESPIRATION RATE: 18 BRPM

## 2023-10-26 DIAGNOSIS — R53.83 FATIGUE, UNSPECIFIED TYPE: ICD-10-CM

## 2023-10-26 DIAGNOSIS — F17.200 TOBACCO USE DISORDER: ICD-10-CM

## 2023-10-26 DIAGNOSIS — C50.912 INFILTRATING DUCTAL CARCINOMA OF LEFT BREAST: ICD-10-CM

## 2023-10-26 DIAGNOSIS — K12.31 ORAL MUCOSITIS (ULCERATIVE) DUE TO ANTINEOPLASTIC THERAPY: Primary | ICD-10-CM

## 2023-10-26 DIAGNOSIS — K64.5 THROMBOSED HEMORRHOIDS: ICD-10-CM

## 2023-10-26 DIAGNOSIS — C50.912 INFILTRATING DUCTAL CARCINOMA OF LEFT BREAST: Primary | ICD-10-CM

## 2023-10-26 PROCEDURE — 1159F MED LIST DOCD IN RCRD: CPT | Mod: CPTII,S$GLB,, | Performed by: STUDENT IN AN ORGANIZED HEALTH CARE EDUCATION/TRAINING PROGRAM

## 2023-10-26 PROCEDURE — 3044F HG A1C LEVEL LT 7.0%: CPT | Mod: CPTII,S$GLB,, | Performed by: STUDENT IN AN ORGANIZED HEALTH CARE EDUCATION/TRAINING PROGRAM

## 2023-10-26 PROCEDURE — 96415 CHEMO IV INFUSION ADDL HR: CPT

## 2023-10-26 PROCEDURE — 63600175 PHARM REV CODE 636 W HCPCS: Performed by: STUDENT IN AN ORGANIZED HEALTH CARE EDUCATION/TRAINING PROGRAM

## 2023-10-26 PROCEDURE — 99999 PR PBB SHADOW E&M-EST. PATIENT-LVL IV: CPT | Mod: PBBFAC,,, | Performed by: STUDENT IN AN ORGANIZED HEALTH CARE EDUCATION/TRAINING PROGRAM

## 2023-10-26 PROCEDURE — 99215 OFFICE O/P EST HI 40 MIN: CPT | Mod: S$GLB,,, | Performed by: STUDENT IN AN ORGANIZED HEALTH CARE EDUCATION/TRAINING PROGRAM

## 2023-10-26 PROCEDURE — 3075F PR MOST RECENT SYSTOLIC BLOOD PRESS GE 130-139MM HG: ICD-10-PCS | Mod: CPTII,S$GLB,, | Performed by: STUDENT IN AN ORGANIZED HEALTH CARE EDUCATION/TRAINING PROGRAM

## 2023-10-26 PROCEDURE — 1159F PR MEDICATION LIST DOCUMENTED IN MEDICAL RECORD: ICD-10-PCS | Mod: CPTII,S$GLB,, | Performed by: STUDENT IN AN ORGANIZED HEALTH CARE EDUCATION/TRAINING PROGRAM

## 2023-10-26 PROCEDURE — 96367 TX/PROPH/DG ADDL SEQ IV INF: CPT

## 2023-10-26 PROCEDURE — 99215 PR OFFICE/OUTPT VISIT, EST, LEVL V, 40-54 MIN: ICD-10-PCS | Mod: S$GLB,,, | Performed by: STUDENT IN AN ORGANIZED HEALTH CARE EDUCATION/TRAINING PROGRAM

## 2023-10-26 PROCEDURE — 96375 TX/PRO/DX INJ NEW DRUG ADDON: CPT

## 2023-10-26 PROCEDURE — 3044F PR MOST RECENT HEMOGLOBIN A1C LEVEL <7.0%: ICD-10-PCS | Mod: CPTII,S$GLB,, | Performed by: STUDENT IN AN ORGANIZED HEALTH CARE EDUCATION/TRAINING PROGRAM

## 2023-10-26 PROCEDURE — 3075F SYST BP GE 130 - 139MM HG: CPT | Mod: CPTII,S$GLB,, | Performed by: STUDENT IN AN ORGANIZED HEALTH CARE EDUCATION/TRAINING PROGRAM

## 2023-10-26 PROCEDURE — 3008F PR BODY MASS INDEX (BMI) DOCUMENTED: ICD-10-PCS | Mod: CPTII,S$GLB,, | Performed by: STUDENT IN AN ORGANIZED HEALTH CARE EDUCATION/TRAINING PROGRAM

## 2023-10-26 PROCEDURE — 3079F PR MOST RECENT DIASTOLIC BLOOD PRESSURE 80-89 MM HG: ICD-10-PCS | Mod: CPTII,S$GLB,, | Performed by: STUDENT IN AN ORGANIZED HEALTH CARE EDUCATION/TRAINING PROGRAM

## 2023-10-26 PROCEDURE — 3008F BODY MASS INDEX DOCD: CPT | Mod: CPTII,S$GLB,, | Performed by: STUDENT IN AN ORGANIZED HEALTH CARE EDUCATION/TRAINING PROGRAM

## 2023-10-26 PROCEDURE — 3079F DIAST BP 80-89 MM HG: CPT | Mod: CPTII,S$GLB,, | Performed by: STUDENT IN AN ORGANIZED HEALTH CARE EDUCATION/TRAINING PROGRAM

## 2023-10-26 PROCEDURE — 96413 CHEMO IV INFUSION 1 HR: CPT

## 2023-10-26 PROCEDURE — 25000003 PHARM REV CODE 250: Performed by: STUDENT IN AN ORGANIZED HEALTH CARE EDUCATION/TRAINING PROGRAM

## 2023-10-26 PROCEDURE — 99999 PR PBB SHADOW E&M-EST. PATIENT-LVL IV: ICD-10-PCS | Mod: PBBFAC,,, | Performed by: STUDENT IN AN ORGANIZED HEALTH CARE EDUCATION/TRAINING PROGRAM

## 2023-10-26 RX ORDER — HEPARIN 100 UNIT/ML
500 SYRINGE INTRAVENOUS
Status: DISCONTINUED | OUTPATIENT
Start: 2023-10-26 | End: 2023-10-26 | Stop reason: HOSPADM

## 2023-10-26 RX ORDER — PROCHLORPERAZINE EDISYLATE 5 MG/ML
5 INJECTION INTRAMUSCULAR; INTRAVENOUS ONCE AS NEEDED
Status: CANCELLED
Start: 2023-10-26

## 2023-10-26 RX ORDER — FAMOTIDINE 10 MG/ML
20 INJECTION INTRAVENOUS
Status: CANCELLED | OUTPATIENT
Start: 2023-10-26

## 2023-10-26 RX ORDER — FAMOTIDINE 10 MG/ML
20 INJECTION INTRAVENOUS
Status: COMPLETED | OUTPATIENT
Start: 2023-10-26 | End: 2023-10-26

## 2023-10-26 RX ORDER — DIPHENHYDRAMINE HYDROCHLORIDE 50 MG/ML
50 INJECTION INTRAMUSCULAR; INTRAVENOUS ONCE AS NEEDED
Status: CANCELLED | OUTPATIENT
Start: 2023-10-26

## 2023-10-26 RX ORDER — HEPARIN 100 UNIT/ML
500 SYRINGE INTRAVENOUS
Status: CANCELLED | OUTPATIENT
Start: 2023-10-26

## 2023-10-26 RX ORDER — SODIUM CHLORIDE 0.9 % (FLUSH) 0.9 %
10 SYRINGE (ML) INJECTION
Status: CANCELLED | OUTPATIENT
Start: 2023-10-26

## 2023-10-26 RX ORDER — DIPHENHYDRAMINE HYDROCHLORIDE 50 MG/ML
50 INJECTION INTRAMUSCULAR; INTRAVENOUS ONCE AS NEEDED
Status: DISCONTINUED | OUTPATIENT
Start: 2023-10-26 | End: 2023-10-26 | Stop reason: HOSPADM

## 2023-10-26 RX ORDER — EPINEPHRINE 0.3 MG/.3ML
0.3 INJECTION SUBCUTANEOUS ONCE AS NEEDED
Status: CANCELLED | OUTPATIENT
Start: 2023-10-26

## 2023-10-26 RX ORDER — EPINEPHRINE 0.3 MG/.3ML
0.3 INJECTION SUBCUTANEOUS ONCE AS NEEDED
Status: DISCONTINUED | OUTPATIENT
Start: 2023-10-26 | End: 2023-10-26 | Stop reason: HOSPADM

## 2023-10-26 RX ORDER — SODIUM CHLORIDE 0.9 % (FLUSH) 0.9 %
10 SYRINGE (ML) INJECTION
Status: DISCONTINUED | OUTPATIENT
Start: 2023-10-26 | End: 2023-10-26 | Stop reason: HOSPADM

## 2023-10-26 RX ORDER — PROCHLORPERAZINE EDISYLATE 5 MG/ML
5 INJECTION INTRAMUSCULAR; INTRAVENOUS ONCE AS NEEDED
Status: DISCONTINUED | OUTPATIENT
Start: 2023-10-26 | End: 2023-10-26 | Stop reason: HOSPADM

## 2023-10-26 RX ADMIN — FAMOTIDINE 20 MG: 10 INJECTION INTRAVENOUS at 10:10

## 2023-10-26 RX ADMIN — HEPARIN 500 UNITS: 100 SYRINGE at 12:10

## 2023-10-26 RX ADMIN — SODIUM CHLORIDE: 9 INJECTION, SOLUTION INTRAVENOUS at 09:10

## 2023-10-26 RX ADMIN — DIPHENHYDRAMINE HYDROCHLORIDE 50 MG: 50 INJECTION, SOLUTION INTRAMUSCULAR; INTRAVENOUS at 10:10

## 2023-10-26 RX ADMIN — SODIUM CHLORIDE 1000 ML: 9 INJECTION, SOLUTION INTRAVENOUS at 09:10

## 2023-10-26 RX ADMIN — DEXAMETHASONE SODIUM PHOSPHATE 10 MG: 4 INJECTION, SOLUTION INTRA-ARTICULAR; INTRALESIONAL; INTRAMUSCULAR; INTRAVENOUS; SOFT TISSUE at 10:10

## 2023-10-26 RX ADMIN — PACLITAXEL 150 MG: 6 INJECTION, SOLUTION, CONCENTRATE INTRAVENOUS at 11:10

## 2023-10-31 ENCOUNTER — OFFICE VISIT (OUTPATIENT)
Dept: HEMATOLOGY/ONCOLOGY | Facility: CLINIC | Age: 53
End: 2023-10-31
Payer: COMMERCIAL

## 2023-10-31 VITALS
WEIGHT: 167.31 LBS | SYSTOLIC BLOOD PRESSURE: 121 MMHG | BODY MASS INDEX: 26.89 KG/M2 | TEMPERATURE: 98 F | OXYGEN SATURATION: 99 % | HEIGHT: 66 IN | DIASTOLIC BLOOD PRESSURE: 83 MMHG | RESPIRATION RATE: 18 BRPM | HEART RATE: 86 BPM

## 2023-10-31 DIAGNOSIS — F17.200 TOBACCO USE DISORDER: ICD-10-CM

## 2023-10-31 DIAGNOSIS — F32.A ANXIETY AND DEPRESSION: ICD-10-CM

## 2023-10-31 DIAGNOSIS — R53.83 FATIGUE, UNSPECIFIED TYPE: ICD-10-CM

## 2023-10-31 DIAGNOSIS — C50.912 INFILTRATING DUCTAL CARCINOMA OF LEFT BREAST: Primary | ICD-10-CM

## 2023-10-31 DIAGNOSIS — K12.31 ORAL MUCOSITIS (ULCERATIVE) DUE TO ANTINEOPLASTIC THERAPY: ICD-10-CM

## 2023-10-31 DIAGNOSIS — G62.9 NEUROPATHY: ICD-10-CM

## 2023-10-31 DIAGNOSIS — F41.9 ANXIETY AND DEPRESSION: ICD-10-CM

## 2023-10-31 DIAGNOSIS — K64.5 THROMBOSED HEMORRHOIDS: ICD-10-CM

## 2023-10-31 PROCEDURE — 99215 PR OFFICE/OUTPT VISIT, EST, LEVL V, 40-54 MIN: ICD-10-PCS | Mod: S$GLB,,, | Performed by: STUDENT IN AN ORGANIZED HEALTH CARE EDUCATION/TRAINING PROGRAM

## 2023-10-31 PROCEDURE — 3008F BODY MASS INDEX DOCD: CPT | Mod: CPTII,S$GLB,, | Performed by: STUDENT IN AN ORGANIZED HEALTH CARE EDUCATION/TRAINING PROGRAM

## 2023-10-31 PROCEDURE — 3079F PR MOST RECENT DIASTOLIC BLOOD PRESSURE 80-89 MM HG: ICD-10-PCS | Mod: CPTII,S$GLB,, | Performed by: STUDENT IN AN ORGANIZED HEALTH CARE EDUCATION/TRAINING PROGRAM

## 2023-10-31 PROCEDURE — 3044F PR MOST RECENT HEMOGLOBIN A1C LEVEL <7.0%: ICD-10-PCS | Mod: CPTII,S$GLB,, | Performed by: STUDENT IN AN ORGANIZED HEALTH CARE EDUCATION/TRAINING PROGRAM

## 2023-10-31 PROCEDURE — 3074F SYST BP LT 130 MM HG: CPT | Mod: CPTII,S$GLB,, | Performed by: STUDENT IN AN ORGANIZED HEALTH CARE EDUCATION/TRAINING PROGRAM

## 2023-10-31 PROCEDURE — 1159F MED LIST DOCD IN RCRD: CPT | Mod: CPTII,S$GLB,, | Performed by: STUDENT IN AN ORGANIZED HEALTH CARE EDUCATION/TRAINING PROGRAM

## 2023-10-31 PROCEDURE — 1159F PR MEDICATION LIST DOCUMENTED IN MEDICAL RECORD: ICD-10-PCS | Mod: CPTII,S$GLB,, | Performed by: STUDENT IN AN ORGANIZED HEALTH CARE EDUCATION/TRAINING PROGRAM

## 2023-10-31 PROCEDURE — 3044F HG A1C LEVEL LT 7.0%: CPT | Mod: CPTII,S$GLB,, | Performed by: STUDENT IN AN ORGANIZED HEALTH CARE EDUCATION/TRAINING PROGRAM

## 2023-10-31 PROCEDURE — 99999 PR PBB SHADOW E&M-EST. PATIENT-LVL IV: CPT | Mod: PBBFAC,,, | Performed by: STUDENT IN AN ORGANIZED HEALTH CARE EDUCATION/TRAINING PROGRAM

## 2023-10-31 PROCEDURE — 3079F DIAST BP 80-89 MM HG: CPT | Mod: CPTII,S$GLB,, | Performed by: STUDENT IN AN ORGANIZED HEALTH CARE EDUCATION/TRAINING PROGRAM

## 2023-10-31 PROCEDURE — 99215 OFFICE O/P EST HI 40 MIN: CPT | Mod: S$GLB,,, | Performed by: STUDENT IN AN ORGANIZED HEALTH CARE EDUCATION/TRAINING PROGRAM

## 2023-10-31 PROCEDURE — 3074F PR MOST RECENT SYSTOLIC BLOOD PRESSURE < 130 MM HG: ICD-10-PCS | Mod: CPTII,S$GLB,, | Performed by: STUDENT IN AN ORGANIZED HEALTH CARE EDUCATION/TRAINING PROGRAM

## 2023-10-31 PROCEDURE — 99999 PR PBB SHADOW E&M-EST. PATIENT-LVL IV: ICD-10-PCS | Mod: PBBFAC,,, | Performed by: STUDENT IN AN ORGANIZED HEALTH CARE EDUCATION/TRAINING PROGRAM

## 2023-10-31 PROCEDURE — 3008F PR BODY MASS INDEX (BMI) DOCUMENTED: ICD-10-PCS | Mod: CPTII,S$GLB,, | Performed by: STUDENT IN AN ORGANIZED HEALTH CARE EDUCATION/TRAINING PROGRAM

## 2023-10-31 NOTE — PROGRESS NOTES
[facility]/ [facility]      Chief Complaint:   TNBC       Cancer Staging   Invasive ductal carcinoma of breast  Staging form: Breast, AJCC 8th Edition  - Clinical stage from 2023: Stage IB (cT1c, cN0, cM0, G3, ER-, FL-, HER2-) - Signed by Sonia Johnson MD on 2023      HPI:  Teresa Valdes is a 53 y.o. female who presents today for evaluation of newly diagnosed breast cancer. Her oncologic history is as follows:    -screening MMG 23 showing an asymmetry in the central L breast. Diagnostic MMG/US revealing a 19 x 12 x17mm complex cystic and solid mass seen in the L breast at 11oclock. No left axillary LAD   -23 biopsy confirming IDC, grade 3. ER negative, FL negative, Her2 2+, negative FISH. Ki67 80%  -23 MRI breast showed 1.6 x 1.0 x 1.2 cm left breast mass with no associated lymphadenopathy.  -2023 started na ddAC-T    Gyn History:   Menarche: 14  Menopause: 46    Age at first pregnancy: 18  : Yes, with second child    Interval History:  Teresa presents today prior to C6D1 naddAC-T. Reports tolerating the taxol much better than AC. Continues to have some fatigue, but significantly less nausea and mouth irritation. She does report mild neuropathy in her fingertips; not painful. Has cold gloves that she plans to use with this next cycle. No new complaints today otherwise.       Patient Active Problem List   Diagnosis    Encounter for colorectal cancer screening    Anxiety and depression    Overweight (BMI 25.0-29.9)    Need for hepatitis C screening test    Need for shingles vaccine    Bunion of great toe of left foot    Laceration of skin of forehead    Invasive ductal carcinoma of breast    Malignant neoplasm of upper-inner quadrant of left breast in female, estrogen receptor negative       Current Outpatient Medications   Medication Instructions    dexAMETHasone (DECADRON) 4 MG Tab Take 8mg (2 pills) po  "daily on days 2-4 of chemotherapy. Only for cycles 1-4    duke's soln (benadryl 30 mL, mylanta 30 mL, LIDOcaine 30 mL, nystatin 30 mL) 120mL 10 mLs, Oral, 4 times daily    ergocalciferol (ERGOCALCIFEROL) 50,000 Units, Oral, Every 7 days    LIDOcaine-prilocaine (EMLA) cream Topical (Top), As needed (PRN)    multivitamin capsule 1 capsule, Oral, Daily    OLANZapine (ZYPREXA) 5 MG tablet Take 5mg nightly on days 1-4 of chemotherapy    ondansetron (ZOFRAN) 8 mg, Oral, Every 12 hours PRN    promethazine (PHENERGAN) 12.5 mg, Oral, Every 4 hours PRN    venlafaxine (EFFEXOR) 37.5 mg, Oral, Daily     Review of Systems:   +fatigue  +neuropathy   12pt ROS negative except as noted above       PHYSICAL EXAM:  /83   Pulse 86   Temp 98 °F (36.7 °C) (Oral)   Resp 18   Ht 5' 6" (1.676 m)   Wt 75.9 kg (167 lb 4.6 oz)   LMP 10/15/2018 (Within Weeks)   SpO2 99%   BMI 27.00 kg/m²     ECOG 1  General: well appearing, in no apparent distress  HEENT: Normocephalic, EOMI, anicteric sclerae, MMM  Neck: supple, without cervical or supraclavicular lymphadenopathy.  Heart: regular rate and rhythm, normal S1 and S2, no murmurs, gallops or rubs.  Lungs: Clear to auscultation bilaterally, no increased wob  Breast: deferred today (previously: difficult to appreciate mass in L breast upper inner quadrant. No axillary LAD)   Abdomen: Soft, nontender, nondistended with normal bowel sounds. No hepatosplenomegaly.  Extremities: No LE edema or joint effusion  Skin: warm, well-perfused, no rash  Neurologic: Alert and oriented x 4, normal speech and gait   Psychiatric: Conversing appropriately with providers throughout today's encounter.       Reviewed all recent labs, imaging and pathology.  Will review labs prior to C6    Assessment & Plan:  Teresa is a dimitry 52yo woman with recently diagnosed cT1cN0 TNBC.     We previously reviewed the natural history of TNBC; given disease <2cm would favor proceeding with naddAC-T. Previously discussed " dosing, logistic, supportive medications and potential side effects. She is now s/p cycle 5 and tolerating fairly well; has noted some fatigue and neuropathy which we will cont to monitor.       #Fatigue: chemotherapy-induced, suspect due to cumulative treatment effect  --encouraged her to push po fluids, optimize nutrition      #Neuropathy: mild, bilateral fingertips  --encouraged to cont using cold socks/gloves  --will monitor this closely and DR if needed      #Mucositis: resolved  --encouraged to use baking soda rinse and Duke's soln. Anticipate these may improve on paclitaxel      #TNBC:  --labs reviewed and ok to proceed with C6D1 as scheduled   --echo 8/10/23 showing EF 60-65%; will repeat following completion of doxorubicin- ordered today   --RTC in 2 weeks for follow up       #Hemorrhoid: non-bleeding. Ongoing pain assoc w this  --has tried multiple OTC remedies, stool softener etc. Has had ongoing discomfort despite this  --referral previously placed to colorectal team to consider banding-has appt with them this week     #Tobacco use disorder:  --1/2 ppd x 20 years; working to cut down on her own- now down to 2 cigarettes qod        All questions were answered to her apparent satisfaction. Will see her back in 2 weeks or sooner should the need arise.        Route Chart for Scheduling    Med Onc Chart Routing      Follow up with physician 2 weeks. RTC in 2 weeks (pt scheduled w stacie so will need to change since she's still out)   Follow up with DARIANA    Infusion scheduling note   cont weekly infusions- scheduled   Injection scheduling note    Labs CBC and CMP   Scheduling:  Preferred lab:  Lab interval: once a week     Imaging None      Pharmacy appointment No pharmacy appointment needed      Other referrals no referral to Oncology Primary Care needed -  no Massage appointment needed    No additional referrals needed               Treatment Plan Information   OP BREAST DOSE-DENSE AC-T (DOXORUBICIN  CYCLOPHOSPHAMIDE Q2W FOLLOWED BY PACLITAXEL WEEKLY)   Sonia Johnson MD   Upcoming Treatment Dates - OP BREAST DOSE-DENSE AC-T (DOXORUBICIN CYCLOPHOSPHAMIDE Q2W FOLLOWED BY PACLITAXEL WEEKLY)    11/2/2023       Pre-Medications       diphenhydrAMINE (BENADRYL) 50 mg in NS 50 mL IVPB       dexAMETHasone (DECADRON) 10 mg in sodium chloride 0.9% 50 mL IVPB       famotidine (PF) injection 20 mg       Chemotherapy       PACLitaxeL (TAXOL) 80 mg/m2 = 150 mg in sodium chloride 0.9% 250 mL chemo infusion       Antiemetics       prochlorperazine injection Soln 5 mg  11/9/2023       Pre-Medications       diphenhydrAMINE (BENADRYL) 50 mg in NS 50 mL IVPB       dexAMETHasone (DECADRON) 10 mg in sodium chloride 0.9% 50 mL IVPB       famotidine (PF) injection 20 mg       Chemotherapy       PACLitaxeL (TAXOL) 80 mg/m2 = 150 mg in sodium chloride 0.9% 250 mL chemo infusion       Antiemetics       prochlorperazine injection Soln 5 mg  11/16/2023       Pre-Medications       diphenhydrAMINE (BENADRYL) 50 mg in NS 50 mL IVPB       dexAMETHasone (DECADRON) 10 mg in sodium chloride 0.9% 50 mL IVPB       famotidine (PF) injection 20 mg       Chemotherapy       PACLitaxeL (TAXOL) 80 mg/m2 = 150 mg in sodium chloride 0.9% 250 mL chemo infusion       Antiemetics       prochlorperazine injection Soln 5 mg  11/23/2023       Pre-Medications       diphenhydrAMINE (BENADRYL) 50 mg in NS 50 mL IVPB       dexAMETHasone (DECADRON) 10 mg in sodium chloride 0.9% 50 mL IVPB       famotidine (PF) injection 20 mg       Chemotherapy       PACLitaxeL (TAXOL) 80 mg/m2 = 150 mg in sodium chloride 0.9% 250 mL chemo infusion       Antiemetics       prochlorperazine injection Soln 5 mg    Supportive Plan Information  OP PEGFILGRASTIM   Moraima Alvarado NP   Upcoming Treatment Dates - OP PEGFILGRASTIM    No upcoming days in selected categories.    Sonia Johnson MD    Total time of this visit, including time spent face to face with patient and/or via  video/audio, and also in preparing for today's visit for MDM and documentation. (Medical Decision Making, including consideration of possible diagnoses, management options, complex medical record review, review of diagnostic tests and information, consideration and discussion of significant complications based on comorbidities, and discussion with providers involved with the care of the patient) 40 minutes. Greater than 50% was spent face to face with the patient counseling and coordinating care.

## 2023-11-01 ENCOUNTER — OFFICE VISIT (OUTPATIENT)
Dept: SURGERY | Facility: CLINIC | Age: 53
End: 2023-11-01
Payer: COMMERCIAL

## 2023-11-01 VITALS
DIASTOLIC BLOOD PRESSURE: 87 MMHG | BODY MASS INDEX: 28.21 KG/M2 | HEIGHT: 66 IN | HEART RATE: 88 BPM | SYSTOLIC BLOOD PRESSURE: 122 MMHG | WEIGHT: 175.5 LBS

## 2023-11-01 DIAGNOSIS — K64.5 THROMBOSED HEMORRHOIDS: ICD-10-CM

## 2023-11-01 PROCEDURE — 3008F PR BODY MASS INDEX (BMI) DOCUMENTED: ICD-10-PCS | Mod: CPTII,S$GLB,, | Performed by: NURSE PRACTITIONER

## 2023-11-01 PROCEDURE — 99999 PR PBB SHADOW E&M-EST. PATIENT-LVL IV: CPT | Mod: PBBFAC,,, | Performed by: NURSE PRACTITIONER

## 2023-11-01 PROCEDURE — 1160F RVW MEDS BY RX/DR IN RCRD: CPT | Mod: CPTII,S$GLB,, | Performed by: NURSE PRACTITIONER

## 2023-11-01 PROCEDURE — 1160F PR REVIEW ALL MEDS BY PRESCRIBER/CLIN PHARMACIST DOCUMENTED: ICD-10-PCS | Mod: CPTII,S$GLB,, | Performed by: NURSE PRACTITIONER

## 2023-11-01 PROCEDURE — 3044F HG A1C LEVEL LT 7.0%: CPT | Mod: CPTII,S$GLB,, | Performed by: NURSE PRACTITIONER

## 2023-11-01 PROCEDURE — 3074F PR MOST RECENT SYSTOLIC BLOOD PRESSURE < 130 MM HG: ICD-10-PCS | Mod: CPTII,S$GLB,, | Performed by: NURSE PRACTITIONER

## 2023-11-01 PROCEDURE — 1159F PR MEDICATION LIST DOCUMENTED IN MEDICAL RECORD: ICD-10-PCS | Mod: CPTII,S$GLB,, | Performed by: NURSE PRACTITIONER

## 2023-11-01 PROCEDURE — 99999 PR PBB SHADOW E&M-EST. PATIENT-LVL IV: ICD-10-PCS | Mod: PBBFAC,,, | Performed by: NURSE PRACTITIONER

## 2023-11-01 PROCEDURE — 3044F PR MOST RECENT HEMOGLOBIN A1C LEVEL <7.0%: ICD-10-PCS | Mod: CPTII,S$GLB,, | Performed by: NURSE PRACTITIONER

## 2023-11-01 PROCEDURE — 3079F DIAST BP 80-89 MM HG: CPT | Mod: CPTII,S$GLB,, | Performed by: NURSE PRACTITIONER

## 2023-11-01 PROCEDURE — 3074F SYST BP LT 130 MM HG: CPT | Mod: CPTII,S$GLB,, | Performed by: NURSE PRACTITIONER

## 2023-11-01 PROCEDURE — 1159F MED LIST DOCD IN RCRD: CPT | Mod: CPTII,S$GLB,, | Performed by: NURSE PRACTITIONER

## 2023-11-01 PROCEDURE — 99214 PR OFFICE/OUTPT VISIT, EST, LEVL IV, 30-39 MIN: ICD-10-PCS | Mod: S$GLB,,, | Performed by: NURSE PRACTITIONER

## 2023-11-01 PROCEDURE — 3008F BODY MASS INDEX DOCD: CPT | Mod: CPTII,S$GLB,, | Performed by: NURSE PRACTITIONER

## 2023-11-01 PROCEDURE — 3079F PR MOST RECENT DIASTOLIC BLOOD PRESSURE 80-89 MM HG: ICD-10-PCS | Mod: CPTII,S$GLB,, | Performed by: NURSE PRACTITIONER

## 2023-11-01 PROCEDURE — 99214 OFFICE O/P EST MOD 30 MIN: CPT | Mod: S$GLB,,, | Performed by: NURSE PRACTITIONER

## 2023-11-01 RX ORDER — HYDROCORTISONE 25 MG/G
CREAM TOPICAL 2 TIMES DAILY
Qty: 28 G | Refills: 1 | Status: SHIPPED | OUTPATIENT
Start: 2023-11-01 | End: 2024-02-27 | Stop reason: CLARIF

## 2023-11-01 NOTE — PROGRESS NOTES
CRS Office Visit History and Physical    Referring Md:   Sonia Johnson Md  [address] [place] [address]    SUBJECTIVE:     Chief Complaint: thrombosed hemorrhoids    History of Present Illness:  The patient is new patient to this practice. Dx w Breast CA 7/2023, currently receiving taxol  Course is as follows:  Patient is a 53 y.o. female presents with referral for thrombosed hemorrhoid.  Having a lot of diarrhea, feels like it agitated a chronic hemorrhoid.  Denies fevers chills.  No pustulant drainage  Today pain is much improved. She has been using OTC treatments   Bur she also states she feels the lesion is getting bigger as time progresses  Scant bleeding    Last Colonoscopy: 4/2023 Redundant sigmoid colon and rectum.                          - One 10 mm polyp in the distal sigmoid colon,                          removed with a hot snare. Resected and retrieved.                          - Perianal skin tags found on perianal exam.                          - Diverticulosis in the sigmoid colon.                          - Internal hemorrhoids.   Family history of colorectal cancer or IBD: no.    Review of patient's allergies indicates:  No Known Allergies    Past Medical History:   Diagnosis Date    Anxiety     Depression     Hypertension     resolved    Ovarian cancer     Sebaceous cyst 4/2/2018     Past Surgical History:   Procedure Laterality Date    ADENOIDECTOMY      BREAST CYST EXCISION Right     BREAST SURGERY      Abscess     CHOLECYSTECTOMY      COLONOSCOPY N/A 4/11/2023    Procedure: COLONOSCOPY;  Surgeon: Mona Valverde MD;  Location: [facility];  Service: Endoscopy;  Laterality: N/A;    CYST REMOVAL      on back    cyst removed left wrist      KNEE LIGAMENT RECONSTRUCTION Left     sleeve gastroectomy  06/2017    in [place]    TONSILLECTOMY      Uvuloplasty       Family History   Problem Relation Age of Onset    Arthritis Mother     Hypertension Father     Heart disease Father   "   Breast cancer Paternal Grandmother     Cancer Neg Hx      Social History     Tobacco Use    Smoking status: Some Days     Current packs/day: 0.50     Average packs/day: 0.5 packs/day for 6.7 years (3.3 ttl pk-yrs)     Types: Cigarettes     Start date: 3/7/2017    Smokeless tobacco: Never    Tobacco comments:     Working on quitting    Substance Use Topics    Alcohol use: Yes     Alcohol/week: 2.0 standard drinks of alcohol     Types: 2 Glasses of wine per week     Comment: occ    Drug use: Never        Review of Systems:  ROS    OBJECTIVE:     Vital Signs (Most Recent)  /87 (BP Location: Left arm, Patient Position: Sitting)   Pulse 88   Ht 5' 5.98" (1.676 m)   Wt 79.6 kg (175 lb 7.8 oz)   LMP 10/15/2018 (Within Weeks)   BMI 28.34 kg/m²     Physical Exam:  General: White female in no distress   Neuro: Alert and oriented to person, place, and time.  Moves all extremities.     HEENT: No icterus.  Trachea midline  Respiratory: Respirations are even and unlabored, no cough or audible wheezing  Skin: Warm dry and intact, No visible rashes, no jaundice    Labs reviewed today:  Lab Results   Component Value Date    WBC 4.37 11/01/2023    HGB 8.1 (L) 11/01/2023    HCT 23.3 (L) 11/01/2023     11/01/2023    CHOL 153 04/13/2023    TRIG 96 04/13/2023    HDL 71 04/13/2023    ALT 20 11/01/2023    AST 31 11/01/2023     11/01/2023    K 4.4 11/01/2023     11/01/2023    CREATININE 0.52 11/01/2023    BUN 2 (L) 11/01/2023    CO2 26 11/01/2023    TSH 1.280 04/13/2023    INR 1.0 08/15/2023    HGBA1C 4.8 04/13/2023         Endoscopy reviewed today:  4/11/2023  - Redundant sigmoid colon and rectum.                          - One 10 mm polyp in the distal sigmoid colon,                          removed with a hot snare. Resected and retrieved.                          - Perianal skin tags found on perianal exam.                          - Diverticulosis in the sigmoid colon.                          - " Internal hemorrhoids.     Anorectal Exam:      L Lateral tissue, some firmness, mild tenderness. Appears to have tear as well. L buttocks toward anterior aspect with shiny skin, mild induration.  Showed picture to Dr. Mendoza, appears hemorrhoidal, treat as such          ASSESSMENT/PLAN:     Diagnoses and all orders for this visit:    Thrombosed hemorrhoids  -     Ambulatory referral/consult to Colorectal Surgery  -     hydrocortisone (ANUSOL-HC) 2.5 % rectal cream; Place rectally 2 (two) times daily.    53 y.o. F here w resolving thrombosed hemorrhoid. We discussed how to treat. If it does not improve, if it worsens or changes pt to call me    The patient was instructed to:  Citrucel fiber   Anusol to hemorrhoid    F/u PRN    KEERTHI Cristobal  Colon and Rectal Surgery

## 2023-11-02 ENCOUNTER — INFUSION (OUTPATIENT)
Dept: INFUSION THERAPY | Facility: HOSPITAL | Age: 53
End: 2023-11-02
Payer: COMMERCIAL

## 2023-11-02 VITALS
SYSTOLIC BLOOD PRESSURE: 119 MMHG | TEMPERATURE: 98 F | HEART RATE: 75 BPM | DIASTOLIC BLOOD PRESSURE: 73 MMHG | BODY MASS INDEX: 29.24 KG/M2 | HEIGHT: 65 IN | RESPIRATION RATE: 18 BRPM | OXYGEN SATURATION: 100 % | WEIGHT: 175.5 LBS

## 2023-11-02 DIAGNOSIS — C50.912 INFILTRATING DUCTAL CARCINOMA OF LEFT BREAST: Primary | ICD-10-CM

## 2023-11-02 PROCEDURE — 96367 TX/PROPH/DG ADDL SEQ IV INF: CPT

## 2023-11-02 PROCEDURE — A4216 STERILE WATER/SALINE, 10 ML: HCPCS | Performed by: STUDENT IN AN ORGANIZED HEALTH CARE EDUCATION/TRAINING PROGRAM

## 2023-11-02 PROCEDURE — 25000003 PHARM REV CODE 250: Performed by: STUDENT IN AN ORGANIZED HEALTH CARE EDUCATION/TRAINING PROGRAM

## 2023-11-02 PROCEDURE — 63600175 PHARM REV CODE 636 W HCPCS: Performed by: STUDENT IN AN ORGANIZED HEALTH CARE EDUCATION/TRAINING PROGRAM

## 2023-11-02 PROCEDURE — 96375 TX/PRO/DX INJ NEW DRUG ADDON: CPT

## 2023-11-02 PROCEDURE — 96413 CHEMO IV INFUSION 1 HR: CPT

## 2023-11-02 RX ORDER — FAMOTIDINE 10 MG/ML
20 INJECTION INTRAVENOUS
Status: CANCELLED | OUTPATIENT
Start: 2023-11-02

## 2023-11-02 RX ORDER — FAMOTIDINE 10 MG/ML
20 INJECTION INTRAVENOUS
Status: COMPLETED | OUTPATIENT
Start: 2023-11-02 | End: 2023-11-02

## 2023-11-02 RX ORDER — PROCHLORPERAZINE EDISYLATE 5 MG/ML
5 INJECTION INTRAMUSCULAR; INTRAVENOUS ONCE AS NEEDED
Status: DISCONTINUED | OUTPATIENT
Start: 2023-11-02 | End: 2023-11-02 | Stop reason: HOSPADM

## 2023-11-02 RX ORDER — EPINEPHRINE 0.3 MG/.3ML
0.3 INJECTION SUBCUTANEOUS ONCE AS NEEDED
Status: DISCONTINUED | OUTPATIENT
Start: 2023-11-02 | End: 2023-11-02 | Stop reason: HOSPADM

## 2023-11-02 RX ORDER — DIPHENHYDRAMINE HYDROCHLORIDE 50 MG/ML
50 INJECTION INTRAMUSCULAR; INTRAVENOUS ONCE AS NEEDED
Status: DISCONTINUED | OUTPATIENT
Start: 2023-11-02 | End: 2023-11-02 | Stop reason: HOSPADM

## 2023-11-02 RX ORDER — SODIUM CHLORIDE 0.9 % (FLUSH) 0.9 %
10 SYRINGE (ML) INJECTION
Status: DISCONTINUED | OUTPATIENT
Start: 2023-11-02 | End: 2023-11-02 | Stop reason: HOSPADM

## 2023-11-02 RX ORDER — EPINEPHRINE 0.3 MG/.3ML
0.3 INJECTION SUBCUTANEOUS ONCE AS NEEDED
Status: CANCELLED | OUTPATIENT
Start: 2023-11-02

## 2023-11-02 RX ORDER — HEPARIN 100 UNIT/ML
500 SYRINGE INTRAVENOUS
Status: DISCONTINUED | OUTPATIENT
Start: 2023-11-02 | End: 2023-11-02 | Stop reason: HOSPADM

## 2023-11-02 RX ORDER — SODIUM CHLORIDE 0.9 % (FLUSH) 0.9 %
10 SYRINGE (ML) INJECTION
Status: CANCELLED | OUTPATIENT
Start: 2023-11-02

## 2023-11-02 RX ORDER — HEPARIN 100 UNIT/ML
500 SYRINGE INTRAVENOUS
Status: CANCELLED | OUTPATIENT
Start: 2023-11-02

## 2023-11-02 RX ORDER — PROCHLORPERAZINE EDISYLATE 5 MG/ML
5 INJECTION INTRAMUSCULAR; INTRAVENOUS ONCE AS NEEDED
Status: CANCELLED
Start: 2023-11-02

## 2023-11-02 RX ORDER — DIPHENHYDRAMINE HYDROCHLORIDE 50 MG/ML
50 INJECTION INTRAMUSCULAR; INTRAVENOUS ONCE AS NEEDED
Status: CANCELLED | OUTPATIENT
Start: 2023-11-02

## 2023-11-02 RX ADMIN — HEPARIN 500 UNITS: 100 SYRINGE at 01:11

## 2023-11-02 RX ADMIN — PACLITAXEL 150 MG: 6 INJECTION, SOLUTION, CONCENTRATE INTRAVENOUS at 12:11

## 2023-11-02 RX ADMIN — DEXAMETHASONE SODIUM PHOSPHATE 10 MG: 4 INJECTION, SOLUTION INTRA-ARTICULAR; INTRALESIONAL; INTRAMUSCULAR; INTRAVENOUS; SOFT TISSUE at 10:11

## 2023-11-02 RX ADMIN — Medication 10 ML: at 01:11

## 2023-11-02 RX ADMIN — FAMOTIDINE 20 MG: 10 INJECTION, SOLUTION INTRAVENOUS at 10:11

## 2023-11-02 RX ADMIN — DIPHENHYDRAMINE HYDROCHLORIDE 50 MG: 50 INJECTION, SOLUTION INTRAMUSCULAR; INTRAVENOUS at 11:11

## 2023-11-02 RX ADMIN — SODIUM CHLORIDE: 0.9 INJECTION, SOLUTION INTRAVENOUS at 10:11

## 2023-11-02 NOTE — PLAN OF CARE
5049 Pt tolerated Taxol #2 infusion well today, no complaints or complications,Pt prefers Benadryl to run over 35 minutes to avoid leg spasms. VSS through duration of treatment. Pt aware to call provider with any questions or concerns and is aware of upcoming appts. Pt ambulatory from clinic with steady gait, no distress noted.

## 2023-11-03 DIAGNOSIS — C50.912 INFILTRATING DUCTAL CARCINOMA OF LEFT BREAST: Primary | ICD-10-CM

## 2023-11-08 RX ORDER — FAMOTIDINE 10 MG/ML
20 INJECTION INTRAVENOUS
Status: CANCELLED | OUTPATIENT
Start: 2023-11-09

## 2023-11-08 RX ORDER — DIPHENHYDRAMINE HYDROCHLORIDE 50 MG/ML
50 INJECTION INTRAMUSCULAR; INTRAVENOUS ONCE AS NEEDED
Status: CANCELLED | OUTPATIENT
Start: 2023-11-09

## 2023-11-08 RX ORDER — HEPARIN 100 UNIT/ML
500 SYRINGE INTRAVENOUS
Status: CANCELLED | OUTPATIENT
Start: 2023-11-09

## 2023-11-08 RX ORDER — PROCHLORPERAZINE EDISYLATE 5 MG/ML
5 INJECTION INTRAMUSCULAR; INTRAVENOUS ONCE AS NEEDED
Status: CANCELLED
Start: 2023-11-09

## 2023-11-08 RX ORDER — SODIUM CHLORIDE 0.9 % (FLUSH) 0.9 %
10 SYRINGE (ML) INJECTION
Status: CANCELLED | OUTPATIENT
Start: 2023-11-09

## 2023-11-08 RX ORDER — EPINEPHRINE 0.3 MG/.3ML
0.3 INJECTION SUBCUTANEOUS ONCE AS NEEDED
Status: CANCELLED | OUTPATIENT
Start: 2023-11-09

## 2023-11-09 ENCOUNTER — INFUSION (OUTPATIENT)
Dept: INFUSION THERAPY | Facility: HOSPITAL | Age: 53
End: 2023-11-09
Payer: COMMERCIAL

## 2023-11-09 VITALS
SYSTOLIC BLOOD PRESSURE: 142 MMHG | BODY MASS INDEX: 29.24 KG/M2 | HEART RATE: 80 BPM | WEIGHT: 175.5 LBS | HEIGHT: 65 IN | OXYGEN SATURATION: 100 % | DIASTOLIC BLOOD PRESSURE: 85 MMHG | RESPIRATION RATE: 16 BRPM | TEMPERATURE: 99 F

## 2023-11-09 DIAGNOSIS — C50.912 INFILTRATING DUCTAL CARCINOMA OF LEFT BREAST: Primary | ICD-10-CM

## 2023-11-09 PROCEDURE — 96367 TX/PROPH/DG ADDL SEQ IV INF: CPT

## 2023-11-09 PROCEDURE — 96375 TX/PRO/DX INJ NEW DRUG ADDON: CPT

## 2023-11-09 PROCEDURE — 96413 CHEMO IV INFUSION 1 HR: CPT

## 2023-11-09 PROCEDURE — 25000003 PHARM REV CODE 250: Performed by: STUDENT IN AN ORGANIZED HEALTH CARE EDUCATION/TRAINING PROGRAM

## 2023-11-09 PROCEDURE — 63600175 PHARM REV CODE 636 W HCPCS: Performed by: STUDENT IN AN ORGANIZED HEALTH CARE EDUCATION/TRAINING PROGRAM

## 2023-11-09 PROCEDURE — A4216 STERILE WATER/SALINE, 10 ML: HCPCS | Performed by: STUDENT IN AN ORGANIZED HEALTH CARE EDUCATION/TRAINING PROGRAM

## 2023-11-09 RX ORDER — DIPHENHYDRAMINE HYDROCHLORIDE 50 MG/ML
50 INJECTION INTRAMUSCULAR; INTRAVENOUS ONCE AS NEEDED
Status: DISCONTINUED | OUTPATIENT
Start: 2023-11-09 | End: 2023-11-09 | Stop reason: HOSPADM

## 2023-11-09 RX ORDER — EPINEPHRINE 0.3 MG/.3ML
0.3 INJECTION SUBCUTANEOUS ONCE AS NEEDED
Status: DISCONTINUED | OUTPATIENT
Start: 2023-11-09 | End: 2023-11-09 | Stop reason: HOSPADM

## 2023-11-09 RX ORDER — SODIUM CHLORIDE 0.9 % (FLUSH) 0.9 %
10 SYRINGE (ML) INJECTION
Status: DISCONTINUED | OUTPATIENT
Start: 2023-11-09 | End: 2023-11-09 | Stop reason: HOSPADM

## 2023-11-09 RX ORDER — HEPARIN 100 UNIT/ML
500 SYRINGE INTRAVENOUS
Status: DISCONTINUED | OUTPATIENT
Start: 2023-11-09 | End: 2023-11-09 | Stop reason: HOSPADM

## 2023-11-09 RX ORDER — FAMOTIDINE 10 MG/ML
20 INJECTION INTRAVENOUS
Status: COMPLETED | OUTPATIENT
Start: 2023-11-09 | End: 2023-11-09

## 2023-11-09 RX ORDER — PROCHLORPERAZINE EDISYLATE 5 MG/ML
5 INJECTION INTRAMUSCULAR; INTRAVENOUS ONCE AS NEEDED
Status: DISCONTINUED | OUTPATIENT
Start: 2023-11-09 | End: 2023-11-09 | Stop reason: HOSPADM

## 2023-11-09 RX ADMIN — DIPHENHYDRAMINE HYDROCHLORIDE 50 MG: 50 INJECTION, SOLUTION INTRAMUSCULAR; INTRAVENOUS at 10:11

## 2023-11-09 RX ADMIN — HEPARIN 500 UNITS: 100 SYRINGE at 12:11

## 2023-11-09 RX ADMIN — Medication 10 ML: at 12:11

## 2023-11-09 RX ADMIN — DEXAMETHASONE SODIUM PHOSPHATE 10 MG: 4 INJECTION, SOLUTION INTRA-ARTICULAR; INTRALESIONAL; INTRAMUSCULAR; INTRAVENOUS; SOFT TISSUE at 10:11

## 2023-11-09 RX ADMIN — PACLITAXEL 150 MG: 6 INJECTION, SOLUTION INTRAVENOUS at 11:11

## 2023-11-09 RX ADMIN — FAMOTIDINE 20 MG: 10 INJECTION, SOLUTION INTRAVENOUS at 10:11

## 2023-11-09 RX ADMIN — SODIUM CHLORIDE: 9 INJECTION, SOLUTION INTRAVENOUS at 10:11

## 2023-11-09 NOTE — PLAN OF CARE
1235-Pt tolerated Taxol infusion well, no complications or side effects, POC and meds discussed with pt, pt aware of upcoming appts, pt knows to call MD with any questions or concerns. Pt ambulated off unit, no distress noted.

## 2023-11-14 NOTE — PROGRESS NOTES
[facility]/ [facility]      Chief Complaint:   TNBC     Cancer Staging   Invasive ductal carcinoma of breast  Staging form: Breast, AJCC 8th Edition  - Clinical stage from 2023: Stage IB (cT1c, cN0, cM0, G3, ER-, NE-, HER2-) - Signed by Sonia Johnson MD on 2023    HPI:  Teresa Valdes is a 53 y.o. female who presents today for evaluation of newly diagnosed breast cancer. Her oncologic history is as follows:    Per Dr. Johnson's note:   -screening MMG 23 showing an asymmetry in the central L breast. Diagnostic MMG/US revealing a 19 x 12 x17mm complex cystic and solid mass seen in the L breast at 11oclock. No left axillary LAD   -23 biopsy confirming IDC, grade 3. ER negative, NE negative, Her2 2+, negative FISH. Ki67 80%  -23 MRI breast showed 1.6 x 1.0 x 1.2 cm left breast mass with no associated lymphadenopathy.  -2023 started na ddAC-T    Gyn History:   Menarche: 14  Menopause: 46    Age at first pregnancy: 18  : Yes, with second child    Interval History:  Teresa presents today prior to C8D1 naddAC-T. Reports tolerating the taxol much better than AC. Continues to have some fatigue, but significantly less nausea and mouth irritation. She does report mild neuropathy in her fingertips; not painful. Has cold gloves that she plans to use with this cycle.  Fingernails are tender and her back teeth are sensitive  No fevers  Eating and drinking well  Bowel movements normal  Hemmorrhoids not bothering her as much lately, saw colorectal and was given a cream      Patient Active Problem List   Diagnosis    Encounter for colorectal cancer screening    Anxiety and depression    Overweight (BMI 25.0-29.9)    Need for hepatitis C screening test    Need for shingles vaccine    Bunion of great toe of left foot    Laceration of skin of forehead    Invasive ductal carcinoma of breast    Malignant neoplasm of upper-inner  "quadrant of left breast in female, estrogen receptor negative       Current Outpatient Medications   Medication Instructions    dexAMETHasone (DECADRON) 4 MG Tab Take 8mg (2 pills) po daily on days 2-4 of chemotherapy. Only for cycles 1-4    duke's soln (benadryl 30 mL, mylanta 30 mL, LIDOcaine 30 mL, nystatin 30 mL) 120mL 10 mLs, Oral, 4 times daily    ergocalciferol (ERGOCALCIFEROL) 50,000 Units, Oral, Every 7 days    hydrocortisone (ANUSOL-HC) 2.5 % rectal cream Rectal, 2 times daily    LIDOcaine-prilocaine (EMLA) cream Topical (Top), As needed (PRN)    multivitamin capsule 1 capsule, Oral, Daily    OLANZapine (ZYPREXA) 5 MG tablet Take 5mg nightly on days 1-4 of chemotherapy    ondansetron (ZOFRAN) 8 mg, Oral, Every 12 hours PRN    promethazine (PHENERGAN) 12.5 mg, Oral, Every 4 hours PRN    venlafaxine (EFFEXOR) 37.5 mg, Oral, Daily     Review of Systems:   +fatigue  +neuropathy   + fingernail tenderness  + hemorrhoids  12pt ROS negative except as noted above       PHYSICAL EXAM:  /76 (BP Location: Left arm, Patient Position: Sitting, BP Method: Medium (Automatic))   Pulse 77   Temp 97.9 °F (36.6 °C) (Oral)   Resp 14   Ht 5' 5" (1.651 m)   Wt 80.6 kg (177 lb 11.1 oz)   LMP 10/15/2018 (Within Weeks)   SpO2 99%   BMI 29.57 kg/m²     ECOG 1  General: well appearing, in no apparent distress  HEENT: Normocephalic, EOMI, anicteric sclerae, MMM  Neck: supple, without cervical or supraclavicular lymphadenopathy.  Heart: regular rate and rhythm, normal S1 and S2, no murmurs, gallops or rubs.  Lungs: Clear to auscultation bilaterally, no increased wob  Breast: no appreciated mass noted to left breast.  No other masses, skin changes or adenopathy noted bilaterally  Abdomen: Soft, nontender, nondistended with normal bowel sounds. No hepatosplenomegaly.  Extremities: No LE edema or joint effusion  Skin: warm, well-perfused, no rash  Neurologic: Alert and oriented x 4, normal speech and gait   Psychiatric: " Conversing appropriately with providers throughout today's encounter.     Reviewed all recent labs, imaging and pathology.  Will review labs prior to C6    Assessment & Plan:  Teresa is a dimitry 54yo woman with recently diagnosed cT1cN0 TNBC.     We previously reviewed the natural history of TNBC; given disease <2cm would favor proceeding with naddAC-T. Previously discussed dosing, logistic, supportive medications and potential side effects. She is now s/p cycle 5 and tolerating fairly well; has noted some fatigue and neuropathy which we will cont to monitor.       #TNBC:  --labs reviewed and ok to proceed with C8D1 as scheduled   --echo 8/10/23 showing EF 60-65%; will repeat following completion of doxorubicin- ordered today   --RTC in 2 weeks for follow up     #Fatigue: chemotherapy-induced, suspect due to cumulative treatment effect  --encouraged her to push po fluids, optimize nutrition    #Neuropathy: mild, bilateral fingertips  --encouraged to cont using cold socks/gloves  -- not interested in acupuncture at this time  --will monitor this closely and DR if needed    #Mucositis: resolved  --encouraged to use baking soda rinse and [employer]'s soln. Anticipate these may improve on paclitaxel    #Hemorrhoid: improved  --saw colorectal and was given a cream    #Tobacco use disorder:  --1/2 ppd x 20 years; working to cut down on her own- now down to 2 cigarettes qod      All questions were answered to her apparent satisfaction. Will see her back in 2 weeks or sooner should the need arise.        Route Chart for Scheduling    Med Onc Chart Routing      Follow up with physician . Scheduled for 11/30   Follow up with DARIANA . 4 weeks   Infusion scheduling note   weekly   Injection scheduling note    Labs CBC and CMP   Scheduling:  Preferred lab:  Lab interval:  weekly   Imaging    Pharmacy appointment    Other referrals                Treatment Plan Information   OP BREAST DOSE-DENSE AC-T (DOXORUBICIN CYCLOPHOSPHAMIDE Q2W  FOLLOWED BY PACLITAXEL WEEKLY)   Sonia Johnson MD   Upcoming Treatment Dates - OP BREAST DOSE-DENSE AC-T (DOXORUBICIN CYCLOPHOSPHAMIDE Q2W FOLLOWED BY PACLITAXEL WEEKLY)    11/16/2023       Pre-Medications       diphenhydrAMINE (BENADRYL) 50 mg in NS 50 mL IVPB       dexAMETHasone (DECADRON) 10 mg in sodium chloride 0.9% 50 mL IVPB       famotidine (PF) injection 20 mg       Chemotherapy       PACLitaxeL (TAXOL) 80 mg/m2 = 150 mg in sodium chloride 0.9% 250 mL chemo infusion       Antiemetics       prochlorperazine injection Soln 5 mg  11/23/2023       Pre-Medications       diphenhydrAMINE (BENADRYL) 50 mg in NS 50 mL IVPB       dexAMETHasone (DECADRON) 10 mg in sodium chloride 0.9% 50 mL IVPB       famotidine (PF) injection 20 mg       Chemotherapy       PACLitaxeL (TAXOL) 80 mg/m2 = 150 mg in sodium chloride 0.9% 250 mL chemo infusion       Antiemetics       prochlorperazine injection Soln 5 mg  11/30/2023       Pre-Medications       diphenhydrAMINE (BENADRYL) 50 mg in NS 50 mL IVPB       dexAMETHasone (DECADRON) 10 mg in sodium chloride 0.9% 50 mL IVPB       famotidine (PF) injection 20 mg       Chemotherapy       PACLitaxeL (TAXOL) 80 mg/m2 = 150 mg in sodium chloride 0.9% 250 mL chemo infusion       Antiemetics       prochlorperazine injection Soln 5 mg  12/7/2023       Pre-Medications       diphenhydrAMINE (BENADRYL) 50 mg in NS 50 mL IVPB       dexAMETHasone (DECADRON) 10 mg in sodium chloride 0.9% 50 mL IVPB       famotidine (PF) injection 20 mg       Chemotherapy       PACLitaxeL (TAXOL) 80 mg/m2 = 150 mg in sodium chloride 0.9% 250 mL chemo infusion       Antiemetics       prochlorperazine injection Soln 5 mg    Supportive Plan Information  OP PEGFILGRASTIM   Moraima Alvarado NP   Upcoming Treatment Dates - OP PEGFILGRASTIM    No upcoming days in selected categories.    MDM includes  :    - Acute or chronic illness or injury that poses a threat to life or bodily function  - Independent review and  explanation of 3+ results from unique tests  - Discussion of management and ordering 3+ unique tests  - Extensive discussion of treatment and management  - Prescription drug management  - Drug therapy requiring intensive monitoring for toxicity     Cate Aj NP

## 2023-11-16 ENCOUNTER — INFUSION (OUTPATIENT)
Dept: INFUSION THERAPY | Facility: HOSPITAL | Age: 53
End: 2023-11-16
Payer: COMMERCIAL

## 2023-11-16 ENCOUNTER — OFFICE VISIT (OUTPATIENT)
Dept: HEMATOLOGY/ONCOLOGY | Facility: CLINIC | Age: 53
End: 2023-11-16
Payer: COMMERCIAL

## 2023-11-16 VITALS
OXYGEN SATURATION: 99 % | RESPIRATION RATE: 14 BRPM | SYSTOLIC BLOOD PRESSURE: 120 MMHG | HEART RATE: 77 BPM | BODY MASS INDEX: 29.61 KG/M2 | WEIGHT: 177.69 LBS | HEIGHT: 65 IN | TEMPERATURE: 98 F | DIASTOLIC BLOOD PRESSURE: 76 MMHG

## 2023-11-16 VITALS
TEMPERATURE: 98 F | SYSTOLIC BLOOD PRESSURE: 123 MMHG | WEIGHT: 174.81 LBS | RESPIRATION RATE: 18 BRPM | DIASTOLIC BLOOD PRESSURE: 78 MMHG | HEIGHT: 65 IN | BODY MASS INDEX: 29.12 KG/M2 | HEART RATE: 81 BPM | OXYGEN SATURATION: 98 %

## 2023-11-16 DIAGNOSIS — F17.200 TOBACCO USE DISORDER: ICD-10-CM

## 2023-11-16 DIAGNOSIS — G62.9 NEUROPATHY: ICD-10-CM

## 2023-11-16 DIAGNOSIS — K64.5 THROMBOSED HEMORRHOIDS: ICD-10-CM

## 2023-11-16 DIAGNOSIS — C50.912 INFILTRATING DUCTAL CARCINOMA OF LEFT BREAST: Primary | ICD-10-CM

## 2023-11-16 DIAGNOSIS — C50.212 MALIGNANT NEOPLASM OF UPPER-INNER QUADRANT OF LEFT BREAST IN FEMALE, ESTROGEN RECEPTOR NEGATIVE: Primary | ICD-10-CM

## 2023-11-16 DIAGNOSIS — R53.83 FATIGUE, UNSPECIFIED TYPE: ICD-10-CM

## 2023-11-16 DIAGNOSIS — Z17.1 MALIGNANT NEOPLASM OF UPPER-INNER QUADRANT OF LEFT BREAST IN FEMALE, ESTROGEN RECEPTOR NEGATIVE: Primary | ICD-10-CM

## 2023-11-16 DIAGNOSIS — C50.912 INFILTRATING DUCTAL CARCINOMA OF LEFT BREAST: ICD-10-CM

## 2023-11-16 PROCEDURE — 3008F PR BODY MASS INDEX (BMI) DOCUMENTED: ICD-10-PCS | Mod: CPTII,S$GLB,, | Performed by: NURSE PRACTITIONER

## 2023-11-16 PROCEDURE — 25000003 PHARM REV CODE 250: Performed by: STUDENT IN AN ORGANIZED HEALTH CARE EDUCATION/TRAINING PROGRAM

## 2023-11-16 PROCEDURE — 3074F PR MOST RECENT SYSTOLIC BLOOD PRESSURE < 130 MM HG: ICD-10-PCS | Mod: CPTII,S$GLB,, | Performed by: NURSE PRACTITIONER

## 2023-11-16 PROCEDURE — 96375 TX/PRO/DX INJ NEW DRUG ADDON: CPT

## 2023-11-16 PROCEDURE — 3044F PR MOST RECENT HEMOGLOBIN A1C LEVEL <7.0%: ICD-10-PCS | Mod: CPTII,S$GLB,, | Performed by: NURSE PRACTITIONER

## 2023-11-16 PROCEDURE — 3078F PR MOST RECENT DIASTOLIC BLOOD PRESSURE < 80 MM HG: ICD-10-PCS | Mod: CPTII,S$GLB,, | Performed by: NURSE PRACTITIONER

## 2023-11-16 PROCEDURE — 63600175 PHARM REV CODE 636 W HCPCS: Performed by: STUDENT IN AN ORGANIZED HEALTH CARE EDUCATION/TRAINING PROGRAM

## 2023-11-16 PROCEDURE — 99999 PR PBB SHADOW E&M-EST. PATIENT-LVL III: ICD-10-PCS | Mod: PBBFAC,,, | Performed by: NURSE PRACTITIONER

## 2023-11-16 PROCEDURE — 99215 PR OFFICE/OUTPT VISIT, EST, LEVL V, 40-54 MIN: ICD-10-PCS | Mod: S$GLB,,, | Performed by: NURSE PRACTITIONER

## 2023-11-16 PROCEDURE — 96367 TX/PROPH/DG ADDL SEQ IV INF: CPT

## 2023-11-16 PROCEDURE — 3074F SYST BP LT 130 MM HG: CPT | Mod: CPTII,S$GLB,, | Performed by: NURSE PRACTITIONER

## 2023-11-16 PROCEDURE — 3008F BODY MASS INDEX DOCD: CPT | Mod: CPTII,S$GLB,, | Performed by: NURSE PRACTITIONER

## 2023-11-16 PROCEDURE — 3044F HG A1C LEVEL LT 7.0%: CPT | Mod: CPTII,S$GLB,, | Performed by: NURSE PRACTITIONER

## 2023-11-16 PROCEDURE — 96413 CHEMO IV INFUSION 1 HR: CPT

## 2023-11-16 PROCEDURE — 99215 OFFICE O/P EST HI 40 MIN: CPT | Mod: S$GLB,,, | Performed by: NURSE PRACTITIONER

## 2023-11-16 PROCEDURE — 3078F DIAST BP <80 MM HG: CPT | Mod: CPTII,S$GLB,, | Performed by: NURSE PRACTITIONER

## 2023-11-16 PROCEDURE — A4216 STERILE WATER/SALINE, 10 ML: HCPCS | Performed by: STUDENT IN AN ORGANIZED HEALTH CARE EDUCATION/TRAINING PROGRAM

## 2023-11-16 PROCEDURE — 99999 PR PBB SHADOW E&M-EST. PATIENT-LVL III: CPT | Mod: PBBFAC,,, | Performed by: NURSE PRACTITIONER

## 2023-11-16 RX ORDER — PROMETHAZINE HYDROCHLORIDE 12.5 MG/1
12.5 TABLET ORAL EVERY 4 HOURS PRN
Qty: 30 TABLET | Refills: 2 | Status: SHIPPED | OUTPATIENT
Start: 2023-11-16 | End: 2024-02-27 | Stop reason: CLARIF

## 2023-11-16 RX ORDER — HEPARIN 100 UNIT/ML
500 SYRINGE INTRAVENOUS
Status: CANCELLED | OUTPATIENT
Start: 2023-11-16

## 2023-11-16 RX ORDER — SODIUM CHLORIDE 0.9 % (FLUSH) 0.9 %
10 SYRINGE (ML) INJECTION
Status: CANCELLED | OUTPATIENT
Start: 2023-11-16

## 2023-11-16 RX ORDER — EPINEPHRINE 0.3 MG/.3ML
0.3 INJECTION SUBCUTANEOUS ONCE AS NEEDED
Status: CANCELLED | OUTPATIENT
Start: 2023-11-16

## 2023-11-16 RX ORDER — FAMOTIDINE 10 MG/ML
20 INJECTION INTRAVENOUS
Status: COMPLETED | OUTPATIENT
Start: 2023-11-16 | End: 2023-11-16

## 2023-11-16 RX ORDER — FAMOTIDINE 10 MG/ML
20 INJECTION INTRAVENOUS
Status: CANCELLED | OUTPATIENT
Start: 2023-11-16

## 2023-11-16 RX ORDER — DIPHENHYDRAMINE HYDROCHLORIDE 50 MG/ML
50 INJECTION INTRAMUSCULAR; INTRAVENOUS ONCE AS NEEDED
Status: CANCELLED | OUTPATIENT
Start: 2023-11-16

## 2023-11-16 RX ORDER — HEPARIN 100 UNIT/ML
500 SYRINGE INTRAVENOUS
Status: DISCONTINUED | OUTPATIENT
Start: 2023-11-16 | End: 2023-11-16 | Stop reason: HOSPADM

## 2023-11-16 RX ORDER — SODIUM CHLORIDE 0.9 % (FLUSH) 0.9 %
10 SYRINGE (ML) INJECTION
Status: DISCONTINUED | OUTPATIENT
Start: 2023-11-16 | End: 2023-11-16 | Stop reason: HOSPADM

## 2023-11-16 RX ORDER — PROCHLORPERAZINE EDISYLATE 5 MG/ML
5 INJECTION INTRAMUSCULAR; INTRAVENOUS ONCE AS NEEDED
Status: DISCONTINUED | OUTPATIENT
Start: 2023-11-16 | End: 2023-11-16 | Stop reason: HOSPADM

## 2023-11-16 RX ORDER — LIDOCAINE AND PRILOCAINE 25; 25 MG/G; MG/G
CREAM TOPICAL
Qty: 5 G | Refills: 1 | Status: SHIPPED | OUTPATIENT
Start: 2023-11-16 | End: 2024-02-27 | Stop reason: CLARIF

## 2023-11-16 RX ORDER — DIPHENHYDRAMINE HYDROCHLORIDE 50 MG/ML
50 INJECTION INTRAMUSCULAR; INTRAVENOUS ONCE AS NEEDED
Status: DISCONTINUED | OUTPATIENT
Start: 2023-11-16 | End: 2023-11-16 | Stop reason: HOSPADM

## 2023-11-16 RX ORDER — EPINEPHRINE 0.3 MG/.3ML
0.3 INJECTION SUBCUTANEOUS ONCE AS NEEDED
Status: DISCONTINUED | OUTPATIENT
Start: 2023-11-16 | End: 2023-11-16 | Stop reason: HOSPADM

## 2023-11-16 RX ORDER — PROCHLORPERAZINE EDISYLATE 5 MG/ML
5 INJECTION INTRAMUSCULAR; INTRAVENOUS ONCE AS NEEDED
Status: CANCELLED
Start: 2023-11-16

## 2023-11-16 RX ADMIN — HEPARIN 500 UNITS: 100 SYRINGE at 11:11

## 2023-11-16 RX ADMIN — DEXAMETHASONE SODIUM PHOSPHATE 10 MG: 4 INJECTION, SOLUTION INTRA-ARTICULAR; INTRALESIONAL; INTRAMUSCULAR; INTRAVENOUS; SOFT TISSUE at 09:11

## 2023-11-16 RX ADMIN — DIPHENHYDRAMINE HYDROCHLORIDE 50 MG: 50 INJECTION, SOLUTION INTRAMUSCULAR; INTRAVENOUS at 08:11

## 2023-11-16 RX ADMIN — PACLITAXEL 150 MG: 6 INJECTION, SOLUTION INTRAVENOUS at 10:11

## 2023-11-16 RX ADMIN — SODIUM CHLORIDE: 9 INJECTION, SOLUTION INTRAVENOUS at 08:11

## 2023-11-16 RX ADMIN — Medication 10 ML: at 11:11

## 2023-11-16 RX ADMIN — FAMOTIDINE 20 MG: 10 INJECTION INTRAVENOUS at 09:11

## 2023-11-16 NOTE — PLAN OF CARE
0830  Patient seated in chair, VSS, assessment done.  Port accessed, flushed, blood return noted ,started NS @ 25 cc/hr KVO  while waiting for Taxol from pharmacy.  WCTM for safety

## 2023-11-16 NOTE — PLAN OF CARE
1110  Patient completed Taxol infusion, tolerated well.  Port deaccessed, flushed, blood return noted, heparin locked.  RTC 11/24/23  Patient ambulated off floor accompanied by .

## 2023-11-23 RX ORDER — DIPHENHYDRAMINE HYDROCHLORIDE 50 MG/ML
50 INJECTION INTRAMUSCULAR; INTRAVENOUS ONCE AS NEEDED
Status: CANCELLED | OUTPATIENT
Start: 2023-11-23

## 2023-11-23 RX ORDER — EPINEPHRINE 0.3 MG/.3ML
0.3 INJECTION SUBCUTANEOUS ONCE AS NEEDED
Status: CANCELLED | OUTPATIENT
Start: 2023-11-23

## 2023-11-23 RX ORDER — HEPARIN 100 UNIT/ML
500 SYRINGE INTRAVENOUS
Status: CANCELLED | OUTPATIENT
Start: 2023-11-23

## 2023-11-23 RX ORDER — PROCHLORPERAZINE EDISYLATE 5 MG/ML
5 INJECTION INTRAMUSCULAR; INTRAVENOUS ONCE AS NEEDED
Status: CANCELLED
Start: 2023-11-23

## 2023-11-23 RX ORDER — SODIUM CHLORIDE 0.9 % (FLUSH) 0.9 %
10 SYRINGE (ML) INJECTION
Status: CANCELLED | OUTPATIENT
Start: 2023-11-23

## 2023-11-23 RX ORDER — FAMOTIDINE 10 MG/ML
20 INJECTION INTRAVENOUS
Status: CANCELLED | OUTPATIENT
Start: 2023-11-23

## 2023-11-24 ENCOUNTER — INFUSION (OUTPATIENT)
Dept: INFUSION THERAPY | Facility: HOSPITAL | Age: 53
End: 2023-11-24
Payer: COMMERCIAL

## 2023-11-24 VITALS
HEIGHT: 66 IN | BODY MASS INDEX: 28.22 KG/M2 | SYSTOLIC BLOOD PRESSURE: 128 MMHG | OXYGEN SATURATION: 98 % | TEMPERATURE: 98 F | DIASTOLIC BLOOD PRESSURE: 85 MMHG | HEART RATE: 70 BPM | RESPIRATION RATE: 20 BRPM | WEIGHT: 175.63 LBS

## 2023-11-24 DIAGNOSIS — C50.912 INFILTRATING DUCTAL CARCINOMA OF LEFT BREAST: Primary | ICD-10-CM

## 2023-11-24 PROCEDURE — 96375 TX/PRO/DX INJ NEW DRUG ADDON: CPT

## 2023-11-24 PROCEDURE — 63600175 PHARM REV CODE 636 W HCPCS: Performed by: STUDENT IN AN ORGANIZED HEALTH CARE EDUCATION/TRAINING PROGRAM

## 2023-11-24 PROCEDURE — 96367 TX/PROPH/DG ADDL SEQ IV INF: CPT

## 2023-11-24 PROCEDURE — 96413 CHEMO IV INFUSION 1 HR: CPT

## 2023-11-24 PROCEDURE — A4216 STERILE WATER/SALINE, 10 ML: HCPCS | Performed by: STUDENT IN AN ORGANIZED HEALTH CARE EDUCATION/TRAINING PROGRAM

## 2023-11-24 PROCEDURE — 25000003 PHARM REV CODE 250: Performed by: STUDENT IN AN ORGANIZED HEALTH CARE EDUCATION/TRAINING PROGRAM

## 2023-11-24 RX ORDER — SODIUM CHLORIDE 0.9 % (FLUSH) 0.9 %
10 SYRINGE (ML) INJECTION
Status: DISCONTINUED | OUTPATIENT
Start: 2023-11-24 | End: 2023-11-24 | Stop reason: HOSPADM

## 2023-11-24 RX ORDER — HEPARIN 100 UNIT/ML
500 SYRINGE INTRAVENOUS
Status: DISCONTINUED | OUTPATIENT
Start: 2023-11-24 | End: 2023-11-24 | Stop reason: HOSPADM

## 2023-11-24 RX ORDER — PROCHLORPERAZINE EDISYLATE 5 MG/ML
5 INJECTION INTRAMUSCULAR; INTRAVENOUS ONCE AS NEEDED
Status: COMPLETED | OUTPATIENT
Start: 2023-11-24 | End: 2023-11-24

## 2023-11-24 RX ORDER — DIPHENHYDRAMINE HYDROCHLORIDE 50 MG/ML
50 INJECTION INTRAMUSCULAR; INTRAVENOUS ONCE AS NEEDED
Status: DISCONTINUED | OUTPATIENT
Start: 2023-11-24 | End: 2023-11-24 | Stop reason: HOSPADM

## 2023-11-24 RX ORDER — EPINEPHRINE 0.3 MG/.3ML
0.3 INJECTION SUBCUTANEOUS ONCE AS NEEDED
Status: DISCONTINUED | OUTPATIENT
Start: 2023-11-24 | End: 2023-11-24 | Stop reason: HOSPADM

## 2023-11-24 RX ORDER — FAMOTIDINE 10 MG/ML
20 INJECTION INTRAVENOUS
Status: COMPLETED | OUTPATIENT
Start: 2023-11-24 | End: 2023-11-24

## 2023-11-24 RX ADMIN — PROCHLORPERAZINE EDISYLATE 5 MG: 5 INJECTION INTRAMUSCULAR; INTRAVENOUS at 12:11

## 2023-11-24 RX ADMIN — Medication 10 ML: at 01:11

## 2023-11-24 RX ADMIN — PACLITAXEL 150 MG: 6 INJECTION, SOLUTION INTRAVENOUS at 11:11

## 2023-11-24 RX ADMIN — DEXAMETHASONE SODIUM PHOSPHATE 10 MG: 4 INJECTION, SOLUTION INTRA-ARTICULAR; INTRALESIONAL; INTRAMUSCULAR; INTRAVENOUS; SOFT TISSUE at 10:11

## 2023-11-24 RX ADMIN — DIPHENHYDRAMINE HYDROCHLORIDE 50 MG: 50 INJECTION, SOLUTION INTRAMUSCULAR; INTRAVENOUS at 11:11

## 2023-11-24 RX ADMIN — HEPARIN 500 UNITS: 100 SYRINGE at 01:11

## 2023-11-24 RX ADMIN — FAMOTIDINE 20 MG: 10 INJECTION INTRAVENOUS at 10:11

## 2023-11-24 RX ADMIN — SODIUM CHLORIDE: 9 INJECTION, SOLUTION INTRAVENOUS at 10:11

## 2023-11-24 NOTE — PLAN OF CARE
Pt received D1C9 of taxol. About nursing home through around 1220 pt stated that she felt nauseas the medication was delayed and vital signs  were done. MD was notified and gave ok to administer 5 mg of compazine taxol was delayed for 10 minutes until nausea subsided. After 10 minutes the pt was reassessed with another set of vitals with pt stating that she felt better and the medication was resumed until completed. Pt prefers to use [facility] for appt schedule and discharged AAOx4 and ambulatory

## 2023-11-29 ENCOUNTER — DOCUMENTATION ONLY (OUTPATIENT)
Dept: HEMATOLOGY/ONCOLOGY | Facility: CLINIC | Age: 53
End: 2023-11-29
Payer: COMMERCIAL

## 2023-11-30 ENCOUNTER — DOCUMENTATION ONLY (OUTPATIENT)
Dept: HEMATOLOGY/ONCOLOGY | Facility: CLINIC | Age: 53
End: 2023-11-30
Payer: COMMERCIAL

## 2023-11-30 ENCOUNTER — LAB VISIT (OUTPATIENT)
Dept: LAB | Facility: HOSPITAL | Age: 53
End: 2023-11-30
Attending: STUDENT IN AN ORGANIZED HEALTH CARE EDUCATION/TRAINING PROGRAM
Payer: COMMERCIAL

## 2023-11-30 ENCOUNTER — OFFICE VISIT (OUTPATIENT)
Dept: HEMATOLOGY/ONCOLOGY | Facility: CLINIC | Age: 53
End: 2023-11-30
Payer: COMMERCIAL

## 2023-11-30 VITALS
HEART RATE: 79 BPM | TEMPERATURE: 98 F | SYSTOLIC BLOOD PRESSURE: 119 MMHG | RESPIRATION RATE: 18 BRPM | OXYGEN SATURATION: 100 % | HEIGHT: 66 IN | WEIGHT: 177.38 LBS | DIASTOLIC BLOOD PRESSURE: 74 MMHG | BODY MASS INDEX: 28.51 KG/M2

## 2023-11-30 DIAGNOSIS — D70.1 CHEMOTHERAPY-INDUCED NEUTROPENIA: ICD-10-CM

## 2023-11-30 DIAGNOSIS — R53.83 FATIGUE, UNSPECIFIED TYPE: ICD-10-CM

## 2023-11-30 DIAGNOSIS — G62.9 NEUROPATHY: ICD-10-CM

## 2023-11-30 DIAGNOSIS — Z17.1 MALIGNANT NEOPLASM OF UPPER-INNER QUADRANT OF LEFT BREAST IN FEMALE, ESTROGEN RECEPTOR NEGATIVE: Primary | ICD-10-CM

## 2023-11-30 DIAGNOSIS — F17.200 TOBACCO USE DISORDER: ICD-10-CM

## 2023-11-30 DIAGNOSIS — Z17.1 MALIGNANT NEOPLASM OF UPPER-INNER QUADRANT OF LEFT BREAST IN FEMALE, ESTROGEN RECEPTOR NEGATIVE: ICD-10-CM

## 2023-11-30 DIAGNOSIS — T45.1X5A CHEMOTHERAPY-INDUCED NEUTROPENIA: ICD-10-CM

## 2023-11-30 DIAGNOSIS — K12.31 ORAL MUCOSITIS (ULCERATIVE) DUE TO ANTINEOPLASTIC THERAPY: ICD-10-CM

## 2023-11-30 DIAGNOSIS — K64.5 THROMBOSED HEMORRHOIDS: ICD-10-CM

## 2023-11-30 DIAGNOSIS — C50.212 MALIGNANT NEOPLASM OF UPPER-INNER QUADRANT OF LEFT BREAST IN FEMALE, ESTROGEN RECEPTOR NEGATIVE: ICD-10-CM

## 2023-11-30 DIAGNOSIS — C50.212 MALIGNANT NEOPLASM OF UPPER-INNER QUADRANT OF LEFT BREAST IN FEMALE, ESTROGEN RECEPTOR NEGATIVE: Primary | ICD-10-CM

## 2023-11-30 LAB
BASOPHILS # BLD AUTO: 0.06 K/UL (ref 0–0.2)
BASOPHILS NFR BLD: 4.1 % (ref 0–1.9)
DIFFERENTIAL METHOD: ABNORMAL
EOSINOPHIL # BLD AUTO: 0.1 K/UL (ref 0–0.5)
EOSINOPHIL NFR BLD: 4.8 % (ref 0–8)
ERYTHROCYTE [DISTWIDTH] IN BLOOD BY AUTOMATED COUNT: 16.1 % (ref 11.5–14.5)
HCT VFR BLD AUTO: 25.5 % (ref 37–48.5)
HGB BLD-MCNC: 8.6 G/DL (ref 12–16)
IMM GRANULOCYTES # BLD AUTO: 0.01 K/UL (ref 0–0.04)
IMM GRANULOCYTES NFR BLD AUTO: 0.7 % (ref 0–0.5)
LYMPHOCYTES # BLD AUTO: 0.5 K/UL (ref 1–4.8)
LYMPHOCYTES NFR BLD: 34 % (ref 18–48)
MCH RBC QN AUTO: 39.8 PG (ref 27–31)
MCHC RBC AUTO-ENTMCNC: 33.7 G/DL (ref 32–36)
MCV RBC AUTO: 118 FL (ref 82–98)
MONOCYTES # BLD AUTO: 0.2 K/UL (ref 0.3–1)
MONOCYTES NFR BLD: 15 % (ref 4–15)
NEUTROPHILS # BLD AUTO: 0.6 K/UL (ref 1.8–7.7)
NEUTROPHILS NFR BLD: 41.4 % (ref 38–73)
NRBC BLD-RTO: 0 /100 WBC
PLATELET # BLD AUTO: 130 K/UL (ref 150–450)
PMV BLD AUTO: 10.9 FL (ref 9.2–12.9)
RBC # BLD AUTO: 2.16 M/UL (ref 4–5.4)
WBC # BLD AUTO: 1.47 K/UL (ref 3.9–12.7)

## 2023-11-30 PROCEDURE — 99215 OFFICE O/P EST HI 40 MIN: CPT | Mod: S$GLB,,, | Performed by: STUDENT IN AN ORGANIZED HEALTH CARE EDUCATION/TRAINING PROGRAM

## 2023-11-30 PROCEDURE — 3044F PR MOST RECENT HEMOGLOBIN A1C LEVEL <7.0%: ICD-10-PCS | Mod: CPTII,S$GLB,, | Performed by: STUDENT IN AN ORGANIZED HEALTH CARE EDUCATION/TRAINING PROGRAM

## 2023-11-30 PROCEDURE — 3074F PR MOST RECENT SYSTOLIC BLOOD PRESSURE < 130 MM HG: ICD-10-PCS | Mod: CPTII,S$GLB,, | Performed by: STUDENT IN AN ORGANIZED HEALTH CARE EDUCATION/TRAINING PROGRAM

## 2023-11-30 PROCEDURE — 3078F PR MOST RECENT DIASTOLIC BLOOD PRESSURE < 80 MM HG: ICD-10-PCS | Mod: CPTII,S$GLB,, | Performed by: STUDENT IN AN ORGANIZED HEALTH CARE EDUCATION/TRAINING PROGRAM

## 2023-11-30 PROCEDURE — 3044F HG A1C LEVEL LT 7.0%: CPT | Mod: CPTII,S$GLB,, | Performed by: STUDENT IN AN ORGANIZED HEALTH CARE EDUCATION/TRAINING PROGRAM

## 2023-11-30 PROCEDURE — 3008F PR BODY MASS INDEX (BMI) DOCUMENTED: ICD-10-PCS | Mod: CPTII,S$GLB,, | Performed by: STUDENT IN AN ORGANIZED HEALTH CARE EDUCATION/TRAINING PROGRAM

## 2023-11-30 PROCEDURE — 85025 COMPLETE CBC W/AUTO DIFF WBC: CPT | Performed by: STUDENT IN AN ORGANIZED HEALTH CARE EDUCATION/TRAINING PROGRAM

## 2023-11-30 PROCEDURE — 36415 COLL VENOUS BLD VENIPUNCTURE: CPT | Performed by: STUDENT IN AN ORGANIZED HEALTH CARE EDUCATION/TRAINING PROGRAM

## 2023-11-30 PROCEDURE — 3008F BODY MASS INDEX DOCD: CPT | Mod: CPTII,S$GLB,, | Performed by: STUDENT IN AN ORGANIZED HEALTH CARE EDUCATION/TRAINING PROGRAM

## 2023-11-30 PROCEDURE — 99999 PR PBB SHADOW E&M-EST. PATIENT-LVL IV: CPT | Mod: PBBFAC,,, | Performed by: STUDENT IN AN ORGANIZED HEALTH CARE EDUCATION/TRAINING PROGRAM

## 2023-11-30 PROCEDURE — 99999 PR PBB SHADOW E&M-EST. PATIENT-LVL IV: ICD-10-PCS | Mod: PBBFAC,,, | Performed by: STUDENT IN AN ORGANIZED HEALTH CARE EDUCATION/TRAINING PROGRAM

## 2023-11-30 PROCEDURE — 99215 PR OFFICE/OUTPT VISIT, EST, LEVL V, 40-54 MIN: ICD-10-PCS | Mod: S$GLB,,, | Performed by: STUDENT IN AN ORGANIZED HEALTH CARE EDUCATION/TRAINING PROGRAM

## 2023-11-30 PROCEDURE — 3078F DIAST BP <80 MM HG: CPT | Mod: CPTII,S$GLB,, | Performed by: STUDENT IN AN ORGANIZED HEALTH CARE EDUCATION/TRAINING PROGRAM

## 2023-11-30 PROCEDURE — 3074F SYST BP LT 130 MM HG: CPT | Mod: CPTII,S$GLB,, | Performed by: STUDENT IN AN ORGANIZED HEALTH CARE EDUCATION/TRAINING PROGRAM

## 2023-11-30 NOTE — PROGRESS NOTES
[facility]/ [facility]      Chief Complaint:   TNBC     Cancer Staging   Invasive ductal carcinoma of breast  Staging form: Breast, AJCC 8th Edition  - Clinical stage from 2023: Stage IB (cT1c, cN0, cM0, G3, ER-, VT-, HER2-) - Signed by Sonia Johnson MD on 2023    HPI:  Teresa Valdes is a 53 y.o. female who presents today for evaluation of newly diagnosed breast cancer. Her oncologic history is as follows:    -screening MMG 23 showing an asymmetry in the central L breast. Diagnostic MMG/US revealing a 19 x 12 x17mm complex cystic and solid mass seen in the L breast at 11oclock. No left axillary LAD   -23 biopsy confirming IDC, grade 3. ER negative, VT negative, Her2 2+, negative FISH. Ki67 80%  -23 MRI breast showed 1.6 x 1.0 x 1.2 cm left breast mass with no associated lymphadenopathy.  -2023 started na ddAC-T    Gyn History:   Menarche: 14  Menopause: 46    Age at first pregnancy: 18  : Yes, with second child    Interval History:  Teresa presents today prior to C10D1 naddAC-T.  Continues to tolerate therapy fairly well, although has noted increasing fatigue with cumulative chemotherapy.  She has also noted ongoing fingernail changes.  Denies fever, nausea, vomiting, diarrhea, mouth sores or neuropathy.      Patient Active Problem List   Diagnosis    Encounter for colorectal cancer screening    Anxiety and depression    Overweight (BMI 25.0-29.9)    Need for hepatitis C screening test    Need for shingles vaccine    Bunion of great toe of left foot    Laceration of skin of forehead    Invasive ductal carcinoma of breast    Malignant neoplasm of upper-inner quadrant of left breast in female, estrogen receptor negative       Current Outpatient Medications   Medication Instructions    dexAMETHasone (DECADRON) 4 MG Tab Take 8mg (2 pills) po daily on days 2-4 of chemotherapy. Only for cycles 1-4    duke's  "soln (benadryl 30 mL, mylanta 30 mL, LIDOcaine 30 mL, nystatin 30 mL) 120mL 10 mLs, Oral, 4 times daily    ergocalciferol (ERGOCALCIFEROL) 50,000 Units, Oral, Every 7 days    hydrocortisone (ANUSOL-HC) 2.5 % rectal cream Rectal, 2 times daily    LIDOcaine-prilocaine (EMLA) cream Topical (Top), As needed (PRN)    multivitamin capsule 1 capsule, Oral, Daily    OLANZapine (ZYPREXA) 5 MG tablet Take 5mg nightly on days 1-4 of chemotherapy    ondansetron (ZOFRAN) 8 mg, Oral, Every 12 hours PRN    promethazine (PHENERGAN) 12.5 mg, Oral, Every 4 hours PRN    venlafaxine (EFFEXOR) 37.5 mg, Oral, Daily     Review of Systems:   +fatigue  + fingernail tenderness  12pt ROS negative except as noted above       PHYSICAL EXAM:  /74   Pulse 79   Temp 97.5 °F (36.4 °C) (Oral)   Resp 18   Ht 5' 6" (1.676 m)   Wt 80.5 kg (177 lb 6.1 oz)   LMP 10/15/2018 (Within Weeks)   SpO2 100%   BMI 28.63 kg/m²     ECOG 1  General: well appearing, in no apparent distress  HEENT: Normocephalic, EOMI, anicteric sclerae, MMM  Neck: supple, without cervical or supraclavicular lymphadenopathy.  Heart: regular rate and rhythm, normal S1 and S2, no murmurs, gallops or rubs.  Lungs: Clear to auscultation bilaterally, no increased wob  Breast: no appreciated mass noted to left breast.  No other masses, skin changes or adenopathy noted bilaterally  Abdomen: Soft, nontender, nondistended with normal bowel sounds. No hepatosplenomegaly.  Extremities: No LE edema or joint effusion  Skin: warm, well-perfused, no rash  Neurologic: Alert and oriented x 4, normal speech and gait   Psychiatric: Conversing appropriately with providers throughout today's encounter.     Reviewed all recent labs, imaging and pathology.  Labs 11/29: WBC 1.87,  Hgb 8.8, Plt 126. Chem unremarkable  Repeat CBC today: WBC 1.47, , Hgb 8.6, Plt 130    Assessment & Plan:  Teresa is a dimitry 54yo woman with recently diagnosed cT1cN0 TNBC.     We previously reviewed " the natural history of TNBC; given disease <2cm would favor proceeding with naddAC-T. Previously discussed dosing, logistic, supportive medications and potential side effects.    Continues to tolerate therapy fairly well, although treatment course today c/b neutropenia.     #TNBC:  --given neutropenia, will hold off on treatment today. Will have her RTC in one week for treatment as scheduled  --knows to reach out if she develops fever  --echo 8/10/23 showing EF 60-65%; will repeat following completion of doxorubicin- scheduled in Dec   --RTC in 2 weeks for follow up     #Neutropenia: 2/2 chemotherapy. Anticipate count recovery in the next few days. Feeling well; denies fever  --counseled on fever, s/s of infection      #Fatigue: chemotherapy-induced, suspect due to cumulative treatment effect  --encouraged her to push po fluids, optimize nutrition    #Neuropathy: mild, bilateral fingertips- stable  --encouraged to cont using cold socks/gloves  -- not interested in acupuncture at this time  --will monitor this closely and DR if needed    #Mucositis: resolved  --encouraged to use baking soda rinse and [employer]'s soln. Anticipate these may improve on paclitaxel    #Hemorrhoid: improved  --saw colorectal and was given a cream    #Tobacco use disorder:  --1/2 ppd x 20 years; working to cut down on her own- now down to 2 cigarettes qod      All questions were answered to her apparent satisfaction. Will see her back in 2 weeks or sooner should the need arise.        Route Chart for Scheduling    Med Onc Chart Routing      Follow up with physician . As scheduled   Follow up with DARIANA . As scheduled   Infusion scheduling note   cont weekly infusions   Injection scheduling note    Labs CBC and CMP   Scheduling:  Preferred lab:  Lab interval: once a week     Imaging None      Pharmacy appointment No pharmacy appointment needed      Other referrals no referral to Oncology Primary Care needed -  no Massage appointment needed    No  additional referrals needed             Treatment Plan Information   OP BREAST DOSE-DENSE AC-T (DOXORUBICIN CYCLOPHOSPHAMIDE Q2W FOLLOWED BY PACLITAXEL WEEKLY)   Sonia Johnson MD   Upcoming Treatment Dates - OP BREAST DOSE-DENSE AC-T (DOXORUBICIN CYCLOPHOSPHAMIDE Q2W FOLLOWED BY PACLITAXEL WEEKLY)    11/30/2023       Pre-Medications       diphenhydrAMINE (BENADRYL) 50 mg in NS 50 mL IVPB       dexAMETHasone (DECADRON) 10 mg in sodium chloride 0.9% 50 mL IVPB       famotidine (PF) injection 20 mg       Chemotherapy       PACLitaxeL (TAXOL) 80 mg/m2 = 150 mg in sodium chloride 0.9% 250 mL chemo infusion       Antiemetics       prochlorperazine injection Soln 5 mg  12/7/2023       Pre-Medications       diphenhydrAMINE (BENADRYL) 50 mg in NS 50 mL IVPB       dexAMETHasone (DECADRON) 10 mg in sodium chloride 0.9% 50 mL IVPB       famotidine (PF) injection 20 mg       Chemotherapy       PACLitaxeL (TAXOL) 80 mg/m2 = 150 mg in sodium chloride 0.9% 250 mL chemo infusion       Antiemetics       prochlorperazine injection Soln 5 mg  12/14/2023       Pre-Medications       diphenhydrAMINE (BENADRYL) 50 mg in NS 50 mL IVPB       dexAMETHasone (DECADRON) 10 mg in sodium chloride 0.9% 50 mL IVPB       famotidine (PF) injection 20 mg       Chemotherapy       PACLitaxeL (TAXOL) 80 mg/m2 = 150 mg in sodium chloride 0.9% 250 mL chemo infusion       Antiemetics       prochlorperazine injection Soln 5 mg  12/21/2023       Pre-Medications       diphenhydrAMINE (BENADRYL) 50 mg in NS 50 mL IVPB       dexAMETHasone (DECADRON) 10 mg in sodium chloride 0.9% 50 mL IVPB       famotidine (PF) injection 20 mg       Chemotherapy       PACLitaxeL (TAXOL) 80 mg/m2 = 150 mg in sodium chloride 0.9% 250 mL chemo infusion       Antiemetics       prochlorperazine injection Soln 5 mg    Supportive Plan Information  OP PEGFILGRASTIM   Moraima Alvarado NP   Upcoming Treatment Dates - OP PEGFILGRASTIM    No upcoming days in selected  categories.    Total time of this visit, including time spent face to face with patient and/or via video/audio, and also in preparing for today's visit for MDM and documentation. (Medical Decision Making, including consideration of possible diagnoses, management options, complex medical record review, review of diagnostic tests and information, consideration and discussion of significant complications based on comorbidities, and discussion with providers involved with the care of the patient) 40 minutes. Greater than 50% was spent face to face with the patient counseling and coordinating care.    Sonia Johnson MD

## 2023-12-07 ENCOUNTER — INFUSION (OUTPATIENT)
Dept: INFUSION THERAPY | Facility: HOSPITAL | Age: 53
End: 2023-12-07
Payer: COMMERCIAL

## 2023-12-07 VITALS
HEART RATE: 79 BPM | BODY MASS INDEX: 28.31 KG/M2 | HEIGHT: 66 IN | TEMPERATURE: 98 F | OXYGEN SATURATION: 99 % | WEIGHT: 176.13 LBS | SYSTOLIC BLOOD PRESSURE: 133 MMHG | DIASTOLIC BLOOD PRESSURE: 8 MMHG | RESPIRATION RATE: 18 BRPM

## 2023-12-07 DIAGNOSIS — C50.912 INFILTRATING DUCTAL CARCINOMA OF LEFT BREAST: Primary | ICD-10-CM

## 2023-12-07 PROCEDURE — 63600175 PHARM REV CODE 636 W HCPCS: Performed by: STUDENT IN AN ORGANIZED HEALTH CARE EDUCATION/TRAINING PROGRAM

## 2023-12-07 PROCEDURE — 96375 TX/PRO/DX INJ NEW DRUG ADDON: CPT

## 2023-12-07 PROCEDURE — 25000003 PHARM REV CODE 250: Performed by: STUDENT IN AN ORGANIZED HEALTH CARE EDUCATION/TRAINING PROGRAM

## 2023-12-07 PROCEDURE — A4216 STERILE WATER/SALINE, 10 ML: HCPCS | Performed by: STUDENT IN AN ORGANIZED HEALTH CARE EDUCATION/TRAINING PROGRAM

## 2023-12-07 PROCEDURE — 96367 TX/PROPH/DG ADDL SEQ IV INF: CPT

## 2023-12-07 PROCEDURE — 96413 CHEMO IV INFUSION 1 HR: CPT

## 2023-12-07 RX ORDER — FAMOTIDINE 10 MG/ML
20 INJECTION INTRAVENOUS
Status: COMPLETED | OUTPATIENT
Start: 2023-12-07 | End: 2023-12-07

## 2023-12-07 RX ORDER — FAMOTIDINE 10 MG/ML
20 INJECTION INTRAVENOUS
Status: CANCELLED | OUTPATIENT
Start: 2023-12-07

## 2023-12-07 RX ORDER — PROCHLORPERAZINE EDISYLATE 5 MG/ML
5 INJECTION INTRAMUSCULAR; INTRAVENOUS ONCE AS NEEDED
Status: DISCONTINUED | OUTPATIENT
Start: 2023-12-07 | End: 2023-12-07 | Stop reason: HOSPADM

## 2023-12-07 RX ORDER — PROCHLORPERAZINE EDISYLATE 5 MG/ML
5 INJECTION INTRAMUSCULAR; INTRAVENOUS ONCE AS NEEDED
Status: CANCELLED
Start: 2023-12-07

## 2023-12-07 RX ORDER — DIPHENHYDRAMINE HYDROCHLORIDE 50 MG/ML
50 INJECTION INTRAMUSCULAR; INTRAVENOUS ONCE AS NEEDED
Status: CANCELLED | OUTPATIENT
Start: 2023-12-07

## 2023-12-07 RX ORDER — HEPARIN 100 UNIT/ML
500 SYRINGE INTRAVENOUS
Status: CANCELLED | OUTPATIENT
Start: 2023-12-07

## 2023-12-07 RX ORDER — HEPARIN 100 UNIT/ML
500 SYRINGE INTRAVENOUS
Status: DISCONTINUED | OUTPATIENT
Start: 2023-12-07 | End: 2023-12-07 | Stop reason: HOSPADM

## 2023-12-07 RX ORDER — SODIUM CHLORIDE 0.9 % (FLUSH) 0.9 %
10 SYRINGE (ML) INJECTION
Status: DISCONTINUED | OUTPATIENT
Start: 2023-12-07 | End: 2023-12-07 | Stop reason: HOSPADM

## 2023-12-07 RX ORDER — SODIUM CHLORIDE 0.9 % (FLUSH) 0.9 %
10 SYRINGE (ML) INJECTION
Status: CANCELLED | OUTPATIENT
Start: 2023-12-07

## 2023-12-07 RX ORDER — DIPHENHYDRAMINE HYDROCHLORIDE 50 MG/ML
50 INJECTION INTRAMUSCULAR; INTRAVENOUS ONCE AS NEEDED
Status: DISCONTINUED | OUTPATIENT
Start: 2023-12-07 | End: 2023-12-07 | Stop reason: HOSPADM

## 2023-12-07 RX ORDER — EPINEPHRINE 0.3 MG/.3ML
0.3 INJECTION SUBCUTANEOUS ONCE AS NEEDED
Status: CANCELLED | OUTPATIENT
Start: 2023-12-07

## 2023-12-07 RX ORDER — EPINEPHRINE 0.3 MG/.3ML
0.3 INJECTION SUBCUTANEOUS ONCE AS NEEDED
Status: DISCONTINUED | OUTPATIENT
Start: 2023-12-07 | End: 2023-12-07 | Stop reason: HOSPADM

## 2023-12-07 RX ADMIN — SODIUM CHLORIDE: 9 INJECTION, SOLUTION INTRAVENOUS at 10:12

## 2023-12-07 RX ADMIN — FAMOTIDINE 20 MG: 10 INJECTION INTRAVENOUS at 10:12

## 2023-12-07 RX ADMIN — HEPARIN 500 UNITS: 100 SYRINGE at 12:12

## 2023-12-07 RX ADMIN — DEXAMETHASONE SODIUM PHOSPHATE 10 MG: 4 INJECTION, SOLUTION INTRA-ARTICULAR; INTRALESIONAL; INTRAMUSCULAR; INTRAVENOUS; SOFT TISSUE at 10:12

## 2023-12-07 RX ADMIN — DIPHENHYDRAMINE HYDROCHLORIDE 50 MG: 50 INJECTION, SOLUTION INTRAMUSCULAR; INTRAVENOUS at 11:12

## 2023-12-07 RX ADMIN — Medication 10 ML: at 12:12

## 2023-12-07 RX ADMIN — PACLITAXEL 150 MG: 6 INJECTION, SOLUTION, CONCENTRATE INTRAVENOUS at 11:12

## 2023-12-07 NOTE — PLAN OF CARE
Pt received DC10 of Taxol without adverse effects. VS remained stable throughout tx. Pt prefers to use [facility] for appt schedule. Discharged AAOx4 and ambulatory

## 2023-12-13 ENCOUNTER — TELEPHONE (OUTPATIENT)
Dept: HEMATOLOGY/ONCOLOGY | Facility: CLINIC | Age: 53
End: 2023-12-13
Payer: COMMERCIAL

## 2023-12-13 NOTE — TELEPHONE ENCOUNTER
Received a critical value alert regarding Ms. Valdes's labs this afternoon. K >9 and Ca <2. I tried to reach out to her but was unable to reach her. I left a VM explaining that these values could be dangerous and instructing her to present to an ER for repeat labs and workup. Will also try to reach out to her mother's number as listed.    Sonia Johnson MD

## 2023-12-13 NOTE — TELEPHONE ENCOUNTER
Spoke with Ms. Valdes and explained the lab results. No comment about hemolysis on labs. She will go to [facility] for repeat labs. She is feeling tired, but otherwise ok. Will be in touch with questions.    Sonia Johnson MD

## 2023-12-14 ENCOUNTER — INFUSION (OUTPATIENT)
Dept: INFUSION THERAPY | Facility: HOSPITAL | Age: 53
End: 2023-12-14
Payer: COMMERCIAL

## 2023-12-14 ENCOUNTER — OFFICE VISIT (OUTPATIENT)
Dept: HEMATOLOGY/ONCOLOGY | Facility: CLINIC | Age: 53
End: 2023-12-14
Payer: COMMERCIAL

## 2023-12-14 VITALS
OXYGEN SATURATION: 100 % | HEIGHT: 66 IN | HEART RATE: 86 BPM | WEIGHT: 181.19 LBS | BODY MASS INDEX: 29.12 KG/M2 | TEMPERATURE: 97 F | RESPIRATION RATE: 17 BRPM | DIASTOLIC BLOOD PRESSURE: 90 MMHG | SYSTOLIC BLOOD PRESSURE: 142 MMHG

## 2023-12-14 VITALS
HEART RATE: 77 BPM | WEIGHT: 181 LBS | BODY MASS INDEX: 29.09 KG/M2 | OXYGEN SATURATION: 100 % | TEMPERATURE: 97 F | RESPIRATION RATE: 18 BRPM | DIASTOLIC BLOOD PRESSURE: 84 MMHG | HEIGHT: 66 IN | SYSTOLIC BLOOD PRESSURE: 133 MMHG

## 2023-12-14 DIAGNOSIS — R53.83 FATIGUE, UNSPECIFIED TYPE: ICD-10-CM

## 2023-12-14 DIAGNOSIS — Z17.1 MALIGNANT NEOPLASM OF UPPER-INNER QUADRANT OF LEFT BREAST IN FEMALE, ESTROGEN RECEPTOR NEGATIVE: Primary | ICD-10-CM

## 2023-12-14 DIAGNOSIS — C50.212 MALIGNANT NEOPLASM OF UPPER-INNER QUADRANT OF LEFT BREAST IN FEMALE, ESTROGEN RECEPTOR NEGATIVE: Primary | ICD-10-CM

## 2023-12-14 DIAGNOSIS — F17.200 TOBACCO USE DISORDER: ICD-10-CM

## 2023-12-14 DIAGNOSIS — C50.912 INFILTRATING DUCTAL CARCINOMA OF LEFT BREAST: Primary | ICD-10-CM

## 2023-12-14 DIAGNOSIS — K64.5 THROMBOSED HEMORRHOIDS: ICD-10-CM

## 2023-12-14 DIAGNOSIS — K12.30 MUCOSITIS: ICD-10-CM

## 2023-12-14 DIAGNOSIS — D70.1 CHEMOTHERAPY INDUCED NEUTROPENIA: ICD-10-CM

## 2023-12-14 DIAGNOSIS — G62.9 NEUROPATHY: ICD-10-CM

## 2023-12-14 DIAGNOSIS — T45.1X5A CHEMOTHERAPY-INDUCED NAUSEA: ICD-10-CM

## 2023-12-14 DIAGNOSIS — T45.1X5A CHEMOTHERAPY INDUCED NEUTROPENIA: ICD-10-CM

## 2023-12-14 DIAGNOSIS — R11.0 CHEMOTHERAPY-INDUCED NAUSEA: ICD-10-CM

## 2023-12-14 PROCEDURE — 99215 PR OFFICE/OUTPT VISIT, EST, LEVL V, 40-54 MIN: ICD-10-PCS | Mod: S$GLB,,, | Performed by: NURSE PRACTITIONER

## 2023-12-14 PROCEDURE — 96413 CHEMO IV INFUSION 1 HR: CPT

## 2023-12-14 PROCEDURE — 96367 TX/PROPH/DG ADDL SEQ IV INF: CPT

## 2023-12-14 PROCEDURE — 99215 OFFICE O/P EST HI 40 MIN: CPT | Mod: S$GLB,,, | Performed by: NURSE PRACTITIONER

## 2023-12-14 PROCEDURE — 3008F PR BODY MASS INDEX (BMI) DOCUMENTED: ICD-10-PCS | Mod: CPTII,S$GLB,, | Performed by: NURSE PRACTITIONER

## 2023-12-14 PROCEDURE — 3044F PR MOST RECENT HEMOGLOBIN A1C LEVEL <7.0%: ICD-10-PCS | Mod: CPTII,S$GLB,, | Performed by: NURSE PRACTITIONER

## 2023-12-14 PROCEDURE — 25000003 PHARM REV CODE 250: Performed by: STUDENT IN AN ORGANIZED HEALTH CARE EDUCATION/TRAINING PROGRAM

## 2023-12-14 PROCEDURE — 96375 TX/PRO/DX INJ NEW DRUG ADDON: CPT

## 2023-12-14 PROCEDURE — 3080F PR MOST RECENT DIASTOLIC BLOOD PRESSURE >= 90 MM HG: ICD-10-PCS | Mod: CPTII,S$GLB,, | Performed by: NURSE PRACTITIONER

## 2023-12-14 PROCEDURE — 3044F HG A1C LEVEL LT 7.0%: CPT | Mod: CPTII,S$GLB,, | Performed by: NURSE PRACTITIONER

## 2023-12-14 PROCEDURE — 99999 PR PBB SHADOW E&M-EST. PATIENT-LVL IV: CPT | Mod: PBBFAC,,, | Performed by: NURSE PRACTITIONER

## 2023-12-14 PROCEDURE — 1159F PR MEDICATION LIST DOCUMENTED IN MEDICAL RECORD: ICD-10-PCS | Mod: CPTII,S$GLB,, | Performed by: NURSE PRACTITIONER

## 2023-12-14 PROCEDURE — 3008F BODY MASS INDEX DOCD: CPT | Mod: CPTII,S$GLB,, | Performed by: NURSE PRACTITIONER

## 2023-12-14 PROCEDURE — 3077F PR MOST RECENT SYSTOLIC BLOOD PRESSURE >= 140 MM HG: ICD-10-PCS | Mod: CPTII,S$GLB,, | Performed by: NURSE PRACTITIONER

## 2023-12-14 PROCEDURE — 3077F SYST BP >= 140 MM HG: CPT | Mod: CPTII,S$GLB,, | Performed by: NURSE PRACTITIONER

## 2023-12-14 PROCEDURE — 99999 PR PBB SHADOW E&M-EST. PATIENT-LVL IV: ICD-10-PCS | Mod: PBBFAC,,, | Performed by: NURSE PRACTITIONER

## 2023-12-14 PROCEDURE — 1159F MED LIST DOCD IN RCRD: CPT | Mod: CPTII,S$GLB,, | Performed by: NURSE PRACTITIONER

## 2023-12-14 PROCEDURE — 63600175 PHARM REV CODE 636 W HCPCS: Performed by: STUDENT IN AN ORGANIZED HEALTH CARE EDUCATION/TRAINING PROGRAM

## 2023-12-14 PROCEDURE — 3080F DIAST BP >= 90 MM HG: CPT | Mod: CPTII,S$GLB,, | Performed by: NURSE PRACTITIONER

## 2023-12-14 PROCEDURE — A4216 STERILE WATER/SALINE, 10 ML: HCPCS | Performed by: STUDENT IN AN ORGANIZED HEALTH CARE EDUCATION/TRAINING PROGRAM

## 2023-12-14 RX ORDER — EPINEPHRINE 0.3 MG/.3ML
0.3 INJECTION SUBCUTANEOUS ONCE AS NEEDED
Status: DISCONTINUED | OUTPATIENT
Start: 2023-12-14 | End: 2023-12-14 | Stop reason: HOSPADM

## 2023-12-14 RX ORDER — HEPARIN 100 UNIT/ML
500 SYRINGE INTRAVENOUS
Status: DISCONTINUED | OUTPATIENT
Start: 2023-12-14 | End: 2023-12-14 | Stop reason: HOSPADM

## 2023-12-14 RX ORDER — PROCHLORPERAZINE EDISYLATE 5 MG/ML
5 INJECTION INTRAMUSCULAR; INTRAVENOUS ONCE AS NEEDED
Status: DISCONTINUED | OUTPATIENT
Start: 2023-12-14 | End: 2023-12-14 | Stop reason: HOSPADM

## 2023-12-14 RX ORDER — SODIUM CHLORIDE 0.9 % (FLUSH) 0.9 %
10 SYRINGE (ML) INJECTION
Status: CANCELLED | OUTPATIENT
Start: 2023-12-14

## 2023-12-14 RX ORDER — SODIUM CHLORIDE 0.9 % (FLUSH) 0.9 %
10 SYRINGE (ML) INJECTION
Status: DISCONTINUED | OUTPATIENT
Start: 2023-12-14 | End: 2023-12-14 | Stop reason: HOSPADM

## 2023-12-14 RX ORDER — DIPHENHYDRAMINE HYDROCHLORIDE 50 MG/ML
50 INJECTION INTRAMUSCULAR; INTRAVENOUS ONCE AS NEEDED
Status: CANCELLED | OUTPATIENT
Start: 2023-12-14

## 2023-12-14 RX ORDER — PROCHLORPERAZINE EDISYLATE 5 MG/ML
5 INJECTION INTRAMUSCULAR; INTRAVENOUS ONCE AS NEEDED
Status: CANCELLED
Start: 2023-12-14

## 2023-12-14 RX ORDER — HEPARIN 100 UNIT/ML
500 SYRINGE INTRAVENOUS
Status: CANCELLED | OUTPATIENT
Start: 2023-12-14

## 2023-12-14 RX ORDER — FAMOTIDINE 10 MG/ML
20 INJECTION INTRAVENOUS
Status: CANCELLED | OUTPATIENT
Start: 2023-12-14

## 2023-12-14 RX ORDER — FAMOTIDINE 10 MG/ML
20 INJECTION INTRAVENOUS
Status: COMPLETED | OUTPATIENT
Start: 2023-12-14 | End: 2023-12-14

## 2023-12-14 RX ORDER — DIPHENHYDRAMINE HYDROCHLORIDE 50 MG/ML
50 INJECTION INTRAMUSCULAR; INTRAVENOUS ONCE AS NEEDED
Status: DISCONTINUED | OUTPATIENT
Start: 2023-12-14 | End: 2023-12-14 | Stop reason: HOSPADM

## 2023-12-14 RX ORDER — EPINEPHRINE 0.3 MG/.3ML
0.3 INJECTION SUBCUTANEOUS ONCE AS NEEDED
Status: CANCELLED | OUTPATIENT
Start: 2023-12-14

## 2023-12-14 RX ADMIN — DEXAMETHASONE SODIUM PHOSPHATE 10 MG: 4 INJECTION, SOLUTION INTRA-ARTICULAR; INTRALESIONAL; INTRAMUSCULAR; INTRAVENOUS; SOFT TISSUE at 09:12

## 2023-12-14 RX ADMIN — SODIUM CHLORIDE: 9 INJECTION, SOLUTION INTRAVENOUS at 09:12

## 2023-12-14 RX ADMIN — PACLITAXEL 150 MG: 6 INJECTION, SOLUTION INTRAVENOUS at 10:12

## 2023-12-14 RX ADMIN — DIPHENHYDRAMINE HYDROCHLORIDE 50 MG: 50 INJECTION, SOLUTION INTRAMUSCULAR; INTRAVENOUS at 09:12

## 2023-12-14 RX ADMIN — Medication 10 ML: at 12:12

## 2023-12-14 RX ADMIN — HEPARIN 500 UNITS: 100 SYRINGE at 12:12

## 2023-12-14 RX ADMIN — FAMOTIDINE 20 MG: 10 INJECTION INTRAVENOUS at 09:12

## 2023-12-14 NOTE — PLAN OF CARE
Pleasant, alert and oriented patient to Chemo Infusion for D1C11 Taxol - VSS and RCW port accessed with blood return observed and flushed without difficulty - patient tolerated Taxol infusion without adverse medication reactions - port de-accessed with NS and Heparin flush performed, and band-aide applied to port site - patient discharged to home per self with  with no concerns - RTC in 1 week.

## 2023-12-14 NOTE — PROGRESS NOTES
[facility]/ [facility]      Chief Complaint:   TNBC     Cancer Staging   Invasive ductal carcinoma of breast  Staging form: Breast, AJCC 8th Edition  - Clinical stage from 2023: Stage IB (cT1c, cN0, cM0, G3, ER-, ID-, HER2-) - Signed by Sonia Johnson MD on 2023    HPI:  Teresa Valdes is a 53 y.o. female who presents today for evaluation of newly diagnosed breast cancer. Her oncologic history is as follows:    -screening MMG 23 showing an asymmetry in the central L breast. Diagnostic MMG/US revealing a 19 x 12 x17mm complex cystic and solid mass seen in the L breast at 11oclock. No left axillary LAD   -23 biopsy confirming IDC, grade 3. ER negative, ID negative, Her2 2+, negative FISH. Ki67 80%  -23 MRI breast showed 1.6 x 1.0 x 1.2 cm left breast mass with no associated lymphadenopathy.  -2023 started na ddAC-T    Gyn History:   Menarche: 14  Menopause: 46    Age at first pregnancy: 18  : Yes, with second child    Interval History:  Teresa presents today prior to C11D1 naddAC-T.  Continues to tolerate therapy fairly well, although has noted increasing fatigue with cumulative chemotherapy.  She has also noted ongoing fingernail changes.    Now having a little more nausea, vomiting more.     Still eating and drinking well  Bowel movements stable  No fever  No new pain  No mouth sores    Patient Active Problem List   Diagnosis    Encounter for colorectal cancer screening    Anxiety and depression    Overweight (BMI 25.0-29.9)    Need for hepatitis C screening test    Need for shingles vaccine    Bunion of great toe of left foot    Laceration of skin of forehead    Invasive ductal carcinoma of breast    Malignant neoplasm of upper-inner quadrant of left breast in female, estrogen receptor negative       Current Outpatient Medications   Medication Instructions    dexAMETHasone (DECADRON) 4 MG Tab Take 8mg (2  "pills) po daily on days 2-4 of chemotherapy. Only for cycles 1-4    duke's soln (benadryl 30 mL, mylanta 30 mL, LIDOcaine 30 mL, nystatin 30 mL) 120mL 10 mLs, Oral, 4 times daily    ergocalciferol (ERGOCALCIFEROL) 50,000 Units, Oral, Every 7 days    hydrocortisone (ANUSOL-HC) 2.5 % rectal cream Rectal, 2 times daily    LIDOcaine-prilocaine (EMLA) cream Topical (Top), As needed (PRN)    multivitamin capsule 1 capsule, Oral, Daily    OLANZapine (ZYPREXA) 5 MG tablet Take 5mg nightly on days 1-4 of chemotherapy    ondansetron (ZOFRAN) 8 mg, Oral, Every 12 hours PRN    promethazine (PHENERGAN) 12.5 mg, Oral, Every 4 hours PRN    venlafaxine (EFFEXOR) 37.5 mg, Oral, Daily     Review of Systems:   +fatigue  + fingernail tenderness  + nausea/vomiting    12pt ROS negative except as noted above       PHYSICAL EXAM:  BP (!) 142/90   Pulse 86   Temp 97 °F (36.1 °C) (Oral)   Resp 17   Ht 5' 6" (1.676 m)   Wt 82.2 kg (181 lb 3.5 oz)   LMP 10/15/2018 (Within Weeks)   SpO2 100%   BMI 29.25 kg/m²     ECOG 1  General: well appearing, in no apparent distress  HEENT: Normocephalic, EOMI, anicteric sclerae, MMM  Neck: supple, without cervical or supraclavicular lymphadenopathy.  Heart: regular rate and rhythm, normal S1 and S2, no murmurs, gallops or rubs.  Lungs: Clear to auscultation bilaterally, no increased wob  Breast: no appreciated mass noted to left breast.  No other masses, skin changes or adenopathy noted bilaterally  Abdomen: Soft, nontender, nondistended with normal bowel sounds. No hepatosplenomegaly.  Extremities: No LE edema or joint effusion  Skin: warm, well-perfused, no rash  Neurologic: Alert and oriented x 4, normal speech and gait   Psychiatric: Conversing appropriately with providers throughout today's encounter.     Reviewed all recent labs, imaging and pathology.  Labs 11/29: WBC 1.87,  Hgb 8.8, Plt 126. Chem unremarkable  Repeat CBC today: WBC 1.47, , Hgb 8.6, Plt 130    Assessment & " Plan:  Teresa is a dimitry 54yo woman with recently diagnosed cT1cN0 TNBC.     We previously reviewed the natural history of TNBC; given disease <2cm would favor proceeding with naddAC-T. Previously discussed dosing, logistic, supportive medications and potential side effects.    Continues to tolerate therapy fairly well, although treatment course today c/b neutropenia.     #TNBC:  --labs reviewed from ER yesterday, acceptable for chemo today  -- she is feeling generally well, just with fatigue  --echo 8/10/23 showing EF 60-65%; will repeat following completion of doxorubicin- scheduled in Dec   --RTC in 2 weeks for follow up     #Neutropenia: 2/2 chemotherapy.  -- most recent anc 2.5   --counseled on fever, s/s of infection    #Fatigue: chemotherapy-induced, suspect due to cumulative treatment effect  --encouraged her to push po fluids, optimize nutrition    #Neuropathy: mild, bilateral fingertips- stable  --encouraged to cont using cold socks/gloves  -- not interested in acupuncture at this time  --will monitor this closely and DR if needed    #Mucositis: resolved  --encouraged to use baking soda rinse and [employer]'s soln. Anticipate these may improve on paclitaxel    #Hemorrhoid: improved  --saw colorectal and was given a cream    #Tobacco use disorder:  --1/2 ppd x 20 years; working to cut down on her own- now down to 2 cigarettes qod      # chemotherapy induced nausea  -- trial of taking a preventative zofran in the am  -- continue phenergan as needed as this does help  -- will continue to monitor    All questions were answered to her apparent satisfaction. Will see her back in 2 weeks or sooner should the need arise.        Route Chart for Scheduling    Med Onc Chart Routing      Follow up with physician 2 weeks.   Follow up with DARIANA 4 weeks.   Infusion scheduling note   weekly   Injection scheduling note    Labs CBC and CMP   Scheduling:  Preferred lab:  Lab interval:  day before infusion   Imaging    Pharmacy  appointment    Other referrals                Treatment Plan Information   OP BREAST DOSE-DENSE AC-T (DOXORUBICIN CYCLOPHOSPHAMIDE Q2W FOLLOWED BY PACLITAXEL WEEKLY)   Sonia Johnson MD   Upcoming Treatment Dates - OP BREAST DOSE-DENSE AC-T (DOXORUBICIN CYCLOPHOSPHAMIDE Q2W FOLLOWED BY PACLITAXEL WEEKLY)    12/8/2023       Pre-Medications       diphenhydrAMINE (BENADRYL) 50 mg in NS 50 mL IVPB       dexAMETHasone (DECADRON) 10 mg in sodium chloride 0.9% 50 mL IVPB       famotidine (PF) injection 20 mg       Chemotherapy       PACLitaxeL (TAXOL) 80 mg/m2 = 150 mg in sodium chloride 0.9% 250 mL chemo infusion       Antiemetics       prochlorperazine injection Soln 5 mg  12/15/2023       Pre-Medications       diphenhydrAMINE (BENADRYL) 50 mg in NS 50 mL IVPB       dexAMETHasone (DECADRON) 10 mg in sodium chloride 0.9% 50 mL IVPB       famotidine (PF) injection 20 mg       Chemotherapy       PACLitaxeL (TAXOL) 80 mg/m2 = 150 mg in sodium chloride 0.9% 250 mL chemo infusion       Antiemetics       prochlorperazine injection Soln 5 mg  12/22/2023       Pre-Medications       diphenhydrAMINE (BENADRYL) 50 mg in NS 50 mL IVPB       dexAMETHasone (DECADRON) 10 mg in sodium chloride 0.9% 50 mL IVPB       famotidine (PF) injection 20 mg       Chemotherapy       PACLitaxeL (TAXOL) 80 mg/m2 = 150 mg in sodium chloride 0.9% 250 mL chemo infusion       Antiemetics       prochlorperazine injection Soln 5 mg  12/29/2023       Pre-Medications       diphenhydrAMINE (BENADRYL) 50 mg in NS 50 mL IVPB       dexAMETHasone (DECADRON) 10 mg in sodium chloride 0.9% 50 mL IVPB       famotidine (PF) injection 20 mg       Chemotherapy       PACLitaxeL (TAXOL) 80 mg/m2 = 150 mg in sodium chloride 0.9% 250 mL chemo infusion       Antiemetics       prochlorperazine injection Soln 5 mg    Supportive Plan Information  OP PEGFILGRASTIM   Moraima Alvarado NP   Upcoming Treatment Dates - OP PEGFILGRASTIM    No upcoming days in selected  categories.    MDM includes  :    - Acute or chronic illness or injury that poses a threat to life or bodily function  - Independent review and explanation of 3+ results from unique tests  - Discussion of management and ordering 3+ unique tests  - Extensive discussion of treatment and management  - Prescription drug management  - Drug therapy requiring intensive monitoring for toxicity    Cate Aj, NP

## 2023-12-14 NOTE — PLAN OF CARE
"  Problem: Fatigue  Goal: Improved Activity Tolerance  Outcome: Ongoing, Progressing  Intervention: Promote Improved Energy  Flowsheets (Taken 12/14/2023 0850)  Fatigue Management:   activity schedule adjusted   activity assistance provided   fatigue-related activity identified   frequent rest breaks encouraged   paced activity encouraged  Sleep/Rest Enhancement:   awakenings minimized   consistent schedule promoted   family presence promoted   natural light exposure provided   noise level reduced   reading promoted   regular sleep/rest pattern promoted   relaxation techniques promoted  Activity Management:   Ambulated -L4   Up in chair - L3  BP (!) 142/90 (Patient Position: Sitting)   Pulse 86   Temp 97 °F (36.1 °C)   Resp 17   Ht 5' 6" (1.676 m)   Wt 82.1 kg (181 lb)   LMP 10/15/2018 (Within Weeks)   SpO2 100%   BMI 29.21 kg/m²        "

## 2023-12-18 ENCOUNTER — HOSPITAL ENCOUNTER (OUTPATIENT)
Dept: CARDIOLOGY | Facility: HOSPITAL | Age: 53
Discharge: HOME | End: 2023-12-18
Attending: STUDENT IN AN ORGANIZED HEALTH CARE EDUCATION/TRAINING PROGRAM
Payer: COMMERCIAL

## 2023-12-18 VITALS
HEIGHT: 66 IN | SYSTOLIC BLOOD PRESSURE: 133 MMHG | DIASTOLIC BLOOD PRESSURE: 84 MMHG | BODY MASS INDEX: 29.09 KG/M2 | WEIGHT: 181 LBS | HEART RATE: 70 BPM

## 2023-12-18 DIAGNOSIS — C50.912 INFILTRATING DUCTAL CARCINOMA OF LEFT BREAST: ICD-10-CM

## 2023-12-18 LAB
ASCENDING AORTA: 3.61 CM
AV INDEX (PROSTH): 0.79
AV MEAN GRADIENT: 3 MMHG
AV PEAK GRADIENT: 6 MMHG
AV VALVE AREA BY VELOCITY RATIO: 3.09 CM²
AV VALVE AREA: 3.3 CM²
AV VELOCITY RATIO: 0.74
BSA FOR ECHO PROCEDURE: 1.96 M2
CV ECHO LV RWT: 0.31 CM
DOP CALC AO PEAK VEL: 1.26 M/S
DOP CALC AO VTI: 23.86 CM
DOP CALC LVOT AREA: 4.2 CM2
DOP CALC LVOT DIAMETER: 2.31 CM
DOP CALC LVOT PEAK VEL: 0.93 M/S
DOP CALC LVOT STROKE VOLUME: 78.67 CM3
DOP CALCLVOT PEAK VEL VTI: 18.78 CM
E WAVE DECELERATION TIME: 195.05 MSEC
E/A RATIO: 0.81
E/E' RATIO: 3.69 M/S
ECHO LV POSTERIOR WALL: 0.79 CM (ref 0.6–1.1)
FRACTIONAL SHORTENING: 34 % (ref 28–44)
GLOBAL LONGITUIDAL STRAIN: 20 %
INTERVENTRICULAR SEPTUM: 0.71 CM (ref 0.6–1.1)
IVRT: 85.63 MSEC
LA MAJOR: 4.65 CM
LA MINOR: 4.98 CM
LA WIDTH: 4.19 CM
LEFT ATRIUM SIZE: 3.26 CM
LEFT ATRIUM VOLUME INDEX MOD: 34 ML/M2
LEFT ATRIUM VOLUME INDEX: 29.1 ML/M2
LEFT ATRIUM VOLUME MOD: 65.24 CM3
LEFT ATRIUM VOLUME: 55.84 CM3
LEFT INTERNAL DIMENSION IN SYSTOLE: 3.34 CM (ref 2.1–4)
LEFT VENTRICLE DIASTOLIC VOLUME INDEX: 63.1 ML/M2
LEFT VENTRICLE DIASTOLIC VOLUME: 121.16 ML
LEFT VENTRICLE MASS INDEX: 66 G/M2
LEFT VENTRICLE SYSTOLIC VOLUME INDEX: 23.7 ML/M2
LEFT VENTRICLE SYSTOLIC VOLUME: 45.53 ML
LEFT VENTRICULAR INTERNAL DIMENSION IN DIASTOLE: 5.05 CM (ref 3.5–6)
LEFT VENTRICULAR MASS: 127.25 G
LV LATERAL E/E' RATIO: 3 M/S
LV SEPTAL E/E' RATIO: 4.8 M/S
MV A" WAVE DURATION": 16.84 MSEC
MV PEAK A VEL: 0.59 M/S
MV PEAK E VEL: 0.48 M/S
MV STENOSIS PRESSURE HALF TIME: 56.57 MS
MV VALVE AREA P 1/2 METHOD: 3.89 CM2
PISA TR MAX VEL: 2.25 M/S
PULM VEIN S/D RATIO: 1.2
PV PEAK D VEL: 0.45 M/S
PV PEAK S VEL: 0.54 M/S
RA MAJOR: 4.26 CM
RA PRESSURE ESTIMATED: 3 MMHG
RA WIDTH: 3.27 CM
RIGHT VENTRICULAR END-DIASTOLIC DIMENSION: 2.95 CM
RV TB RVSP: 5 MMHG
SINUS: 3.62 CM
STJ: 3.27 CM
TDI LATERAL: 0.16 M/S
TDI SEPTAL: 0.1 M/S
TDI: 0.13 M/S
TR MAX PG: 20 MMHG
TRICUSPID ANNULAR PLANE SYSTOLIC EXCURSION: 2.39 CM
TV REST PULMONARY ARTERY PRESSURE: 23 MMHG
Z-SCORE OF LEFT VENTRICULAR DIMENSION IN END DIASTOLE: -0.7
Z-SCORE OF LEFT VENTRICULAR DIMENSION IN END SYSTOLE: 0.01

## 2023-12-18 PROCEDURE — 93306 ECHO (CUPID ONLY): ICD-10-PCS | Mod: 26,,, | Performed by: INTERNAL MEDICINE

## 2023-12-18 PROCEDURE — 93356 MYOCRD STRAIN IMG SPCKL TRCK: CPT | Mod: ,,, | Performed by: INTERNAL MEDICINE

## 2023-12-18 PROCEDURE — 93356 ECHO (CUPID ONLY): ICD-10-PCS | Mod: ,,, | Performed by: INTERNAL MEDICINE

## 2023-12-18 PROCEDURE — 93306 TTE W/DOPPLER COMPLETE: CPT | Mod: 26,,, | Performed by: INTERNAL MEDICINE

## 2023-12-18 PROCEDURE — 93356 MYOCRD STRAIN IMG SPCKL TRCK: CPT

## 2023-12-20 ENCOUNTER — DOCUMENTATION ONLY (OUTPATIENT)
Dept: HEMATOLOGY/ONCOLOGY | Facility: CLINIC | Age: 53
End: 2023-12-20
Payer: COMMERCIAL

## 2023-12-27 NOTE — PROGRESS NOTES
[facility]/ [facility]      Chief Complaint:   TNBC     Cancer Staging   Invasive ductal carcinoma of breast  Staging form: Breast, AJCC 8th Edition  - Clinical stage from 2023: Stage IB (cT1c, cN0, cM0, G3, ER-, CA-, HER2-) - Signed by Sonia Johnson MD on 2023    HPI:  Teresa Valdes is a 53 y.o. female who presents today for evaluation of newly diagnosed breast cancer. Her oncologic history is as follows:    -screening MMG 23 showing an asymmetry in the central L breast. Diagnostic MMG/US revealing a 19 x 12 x17mm complex cystic and solid mass seen in the L breast at 11oclock. No left axillary LAD   -23 biopsy confirming IDC, grade 3. ER negative, CA negative, Her2 2+, negative FISH. Ki67 80%  -23 MRI breast showed 1.6 x 1.0 x 1.2 cm left breast mass with no associated lymphadenopathy.  -2023 started na ddAC-T    Gyn History:   Menarche: 14  Menopause: 46    Age at first pregnancy: 18  : Yes, with second child      Interval History:  Teresa presents today prior to C12D1 naddAC-T.  Tolerating therapy fairly well. Notes ongoing nausea and fingernail changes. She also reports worsening neuropathy- now in bilateral toes as well as fingertips. Explains that the symptoms are mild but constant. Otherwise she is feeling well; looking forward to completing treatment. Denies fever, cough, sob, vomiting, diarrhea or mouth sores.       Patient Active Problem List   Diagnosis    Encounter for colorectal cancer screening    Anxiety and depression    Overweight (BMI 25.0-29.9)    Need for hepatitis C screening test    Need for shingles vaccine    Bunion of great toe of left foot    Laceration of skin of forehead    Invasive ductal carcinoma of breast    Malignant neoplasm of upper-inner quadrant of left breast in female, estrogen receptor negative       Current Outpatient Medications   Medication Instructions     "dexAMETHasone (DECADRON) 4 MG Tab Take 8mg (2 pills) po daily on days 2-4 of chemotherapy. Only for cycles 1-4    duke's soln (benadryl 30 mL, mylanta 30 mL, LIDOcaine 30 mL, nystatin 30 mL) 120mL 10 mLs, Oral, 4 times daily    ergocalciferol (ERGOCALCIFEROL) 50,000 Units, Oral, Every 7 days    hydrocortisone (ANUSOL-HC) 2.5 % rectal cream Rectal, 2 times daily    LIDOcaine-prilocaine (EMLA) cream Topical (Top), As needed (PRN)    multivitamin capsule 1 capsule, Oral, Daily    OLANZapine (ZYPREXA) 5 MG tablet Take 5mg nightly on days 1-4 of chemotherapy    ondansetron (ZOFRAN) 8 mg, Oral, Every 12 hours PRN    promethazine (PHENERGAN) 12.5 mg, Oral, Every 4 hours PRN    venlafaxine (EFFEXOR) 37.5 mg, Oral, Daily     Review of Systems:   Answers submitted by the patient for this visit:  Review of Systems Questionnaire (Submitted on 12/28/2023)  appetite change : No  unexpected weight change: No  mouth sores: No  visual disturbance: No  cough: No  shortness of breath: No  chest pain: No  abdominal pain: No  diarrhea: Yes  frequency: No  back pain: No  rash: No  headaches: No  adenopathy: No  nervous/ anxious: No      PHYSICAL EXAM:  BP (!) 159/96   Pulse 70   Temp 98 °F (36.7 °C) (Oral)   Resp 17   Ht 5' 6" (1.676 m)   Wt 81.5 kg (179 lb 12.6 oz)   LMP 10/15/2018 (Within Weeks)   SpO2 100%   BMI 29.02 kg/m²     ECOG 1  General: well appearing, in no apparent distress  HEENT: Normocephalic, EOMI, anicteric sclerae, MMM  Neck: supple, without cervical or supraclavicular lymphadenopathy.  Heart: regular rate and rhythm, normal S1 and S2, no murmurs, gallops or rubs.  Lungs: Clear to auscultation bilaterally, no increased wob  Breast: no appreciated mass noted to left breast.  No other masses, skin changes or adenopathy noted bilaterally  Abdomen: Soft, nontender, nondistended with normal bowel sounds. No hepatosplenomegaly.  Extremities: No LE edema or joint effusion  Skin: warm, well-perfused, no " rash  Neurologic: Alert and oriented x 4, normal speech and gait   Psychiatric: Conversing appropriately with providers throughout today's encounter.     Reviewed all recent labs, imaging and pathology.  WBC 2.43, ANC 1100, Hgb 11.9, Plt 171  Chem wnl     Assessment & Plan:  Teresa is a dimitry 54yo woman with recently diagnosed cT1cN0 TNBC.     We previously reviewed the natural history of TNBC; given disease <2cm would favor proceeding with naddAC-T. Previously discussed dosing, logistic, supportive medications and potential side effects.    Continues to tolerate therapy fairly well, although treatment course c/b neutropenia and neuropathy.     #TNBC:  --given ongoing difficulty with neutropenia and now worsening neuropathy, will DR by 20% starting with infusion today  --labs reviewed and ok to proceed with treatment as scheduled   --echo 12/18/23 with stable EF 55-60%  --RTC in 2 weeks for follow up     #Neutropenia: 2/2 chemotherapy.  --most recent anc 1100, ok for treatment today. Will DR as above   --counseled on fever, s/s of infection    #Fatigue: chemotherapy-induced, suspect due to cumulative treatment effect  --encouraged her to push po fluids, optimize nutrition    #Neuropathy: bilateral fingertips and now toes  --plan for DR as above  --encouraged to cont using cold socks/gloves  --not interested in acupuncture at this time  --will cont to monitor this closely     #Fingernail changes: 2/2 chemo  --cont to monitor, non-painful for now and no s/s of infection    #Mucositis: resolved  --encouraged to use baking soda rinse and [employer]'s soln. Anticipate these may improve on paclitaxel    #Hemorrhoid: improved  --saw colorectal and was given a cream    #Tobacco use disorder:  --1/2 ppd x 20 years; working to cut down on her own- now down to 2 cigarettes qod      #Chemotherapy induced nausea: stable   --cont trial of taking a preventative zofran in the am  --continue phenergan as needed as this does help    All  questions were answered to her apparent satisfaction. Will see her back in 2 weeks or sooner should the need arise.        Route Chart for Scheduling    Med Onc Chart Routing  Urgent    Follow up with physician 4 weeks.   Follow up with DARIANA 2 weeks. scheduled   Infusion scheduling note   needs to be scheduled out for a few more infusions: 1/4, 1/11,1/18 and 1/25   Injection scheduling note    Labs CBC and CMP   Scheduling:  Preferred lab:  Lab interval: once a week     Imaging None      Pharmacy appointment No pharmacy appointment needed      Other referrals no referral to Oncology Primary Care needed -  no Massage appointment needed    No additional referrals needed             Treatment Plan Information   OP BREAST DOSE-DENSE AC-T (DOXORUBICIN CYCLOPHOSPHAMIDE Q2W FOLLOWED BY PACLITAXEL WEEKLY)   Sonia Johnson MD   Upcoming Treatment Dates - OP BREAST DOSE-DENSE AC-T (DOXORUBICIN CYCLOPHOSPHAMIDE Q2W FOLLOWED BY PACLITAXEL WEEKLY)    12/15/2023       Pre-Medications       diphenhydrAMINE (BENADRYL) 50 mg in NS 50 mL IVPB       dexAMETHasone (DECADRON) 10 mg in sodium chloride 0.9% 50 mL IVPB       famotidine (PF) injection 20 mg       Chemotherapy       PACLitaxeL (TAXOL) 80 mg/m2 = 150 mg in sodium chloride 0.9% 250 mL chemo infusion       Antiemetics       prochlorperazine injection Soln 5 mg  12/22/2023       Pre-Medications       diphenhydrAMINE (BENADRYL) 50 mg in NS 50 mL IVPB       dexAMETHasone (DECADRON) 10 mg in sodium chloride 0.9% 50 mL IVPB       famotidine (PF) injection 20 mg       Chemotherapy       PACLitaxeL (TAXOL) 80 mg/m2 = 150 mg in sodium chloride 0.9% 250 mL chemo infusion       Antiemetics       prochlorperazine injection Soln 5 mg  12/29/2023       Pre-Medications       diphenhydrAMINE (BENADRYL) 50 mg in NS 50 mL IVPB       dexAMETHasone (DECADRON) 10 mg in sodium chloride 0.9% 50 mL IVPB       famotidine (PF) injection 20 mg       Chemotherapy       PACLitaxeL (TAXOL) 80 mg/m2 =  150 mg in sodium chloride 0.9% 250 mL chemo infusion       Antiemetics       prochlorperazine injection Soln 5 mg  1/5/2024       Pre-Medications       diphenhydrAMINE (BENADRYL) 50 mg in NS 50 mL IVPB       dexAMETHasone (DECADRON) 10 mg in sodium chloride 0.9% 50 mL IVPB       famotidine (PF) injection 20 mg       Chemotherapy       PACLitaxeL (TAXOL) 80 mg/m2 = 150 mg in sodium chloride 0.9% 250 mL chemo infusion       Antiemetics       prochlorperazine injection Soln 5 mg    Supportive Plan Information  OP PEGFILGRASTIM   Moraima Alvarado NP   Upcoming Treatment Dates - OP PEGFILGRASTIM    No upcoming days in selected categories.    MDM includes  :    - Acute or chronic illness or injury that poses a threat to life or bodily function  - Independent review and explanation of 3+ results from unique tests  - Discussion of management and ordering 3+ unique tests  - Extensive discussion of treatment and management  - Prescription drug management  - Drug therapy requiring intensive monitoring for toxicity    Sonia Johnson MD

## 2023-12-28 ENCOUNTER — INFUSION (OUTPATIENT)
Dept: INFUSION THERAPY | Facility: HOSPITAL | Age: 53
End: 2023-12-28
Payer: COMMERCIAL

## 2023-12-28 ENCOUNTER — OFFICE VISIT (OUTPATIENT)
Dept: HEMATOLOGY/ONCOLOGY | Facility: CLINIC | Age: 53
End: 2023-12-28
Payer: COMMERCIAL

## 2023-12-28 VITALS
BODY MASS INDEX: 28.9 KG/M2 | HEART RATE: 70 BPM | DIASTOLIC BLOOD PRESSURE: 93 MMHG | HEIGHT: 66 IN | SYSTOLIC BLOOD PRESSURE: 159 MMHG | RESPIRATION RATE: 17 BRPM | RESPIRATION RATE: 18 BRPM | OXYGEN SATURATION: 100 % | OXYGEN SATURATION: 99 % | HEART RATE: 68 BPM | WEIGHT: 179.81 LBS | TEMPERATURE: 98 F | WEIGHT: 179.81 LBS | SYSTOLIC BLOOD PRESSURE: 164 MMHG | HEIGHT: 66 IN | TEMPERATURE: 98 F | BODY MASS INDEX: 28.9 KG/M2 | DIASTOLIC BLOOD PRESSURE: 96 MMHG

## 2023-12-28 DIAGNOSIS — K12.30 MUCOSITIS: ICD-10-CM

## 2023-12-28 DIAGNOSIS — Z17.1 MALIGNANT NEOPLASM OF UPPER-INNER QUADRANT OF LEFT BREAST IN FEMALE, ESTROGEN RECEPTOR NEGATIVE: Primary | ICD-10-CM

## 2023-12-28 DIAGNOSIS — C50.212 MALIGNANT NEOPLASM OF UPPER-INNER QUADRANT OF LEFT BREAST IN FEMALE, ESTROGEN RECEPTOR NEGATIVE: Primary | ICD-10-CM

## 2023-12-28 DIAGNOSIS — K64.5 THROMBOSED HEMORRHOIDS: ICD-10-CM

## 2023-12-28 DIAGNOSIS — R11.0 CHEMOTHERAPY-INDUCED NAUSEA: ICD-10-CM

## 2023-12-28 DIAGNOSIS — K12.31 ORAL MUCOSITIS (ULCERATIVE) DUE TO ANTINEOPLASTIC THERAPY: ICD-10-CM

## 2023-12-28 DIAGNOSIS — L60.9 FINGERNAIL ABNORMALITIES: ICD-10-CM

## 2023-12-28 DIAGNOSIS — R53.83 FATIGUE, UNSPECIFIED TYPE: ICD-10-CM

## 2023-12-28 DIAGNOSIS — T45.1X5A CHEMOTHERAPY-INDUCED NAUSEA: ICD-10-CM

## 2023-12-28 DIAGNOSIS — D70.1 CHEMOTHERAPY INDUCED NEUTROPENIA: ICD-10-CM

## 2023-12-28 DIAGNOSIS — G62.9 NEUROPATHY: ICD-10-CM

## 2023-12-28 DIAGNOSIS — C50.912 INFILTRATING DUCTAL CARCINOMA OF LEFT BREAST: Primary | ICD-10-CM

## 2023-12-28 DIAGNOSIS — T45.1X5A CHEMOTHERAPY INDUCED NEUTROPENIA: ICD-10-CM

## 2023-12-28 DIAGNOSIS — F17.200 TOBACCO USE DISORDER: ICD-10-CM

## 2023-12-28 PROCEDURE — 3077F PR MOST RECENT SYSTOLIC BLOOD PRESSURE >= 140 MM HG: ICD-10-PCS | Mod: CPTII,S$GLB,, | Performed by: STUDENT IN AN ORGANIZED HEALTH CARE EDUCATION/TRAINING PROGRAM

## 2023-12-28 PROCEDURE — 3077F SYST BP >= 140 MM HG: CPT | Mod: CPTII,S$GLB,, | Performed by: STUDENT IN AN ORGANIZED HEALTH CARE EDUCATION/TRAINING PROGRAM

## 2023-12-28 PROCEDURE — 3080F DIAST BP >= 90 MM HG: CPT | Mod: CPTII,S$GLB,, | Performed by: STUDENT IN AN ORGANIZED HEALTH CARE EDUCATION/TRAINING PROGRAM

## 2023-12-28 PROCEDURE — 3008F PR BODY MASS INDEX (BMI) DOCUMENTED: ICD-10-PCS | Mod: CPTII,S$GLB,, | Performed by: STUDENT IN AN ORGANIZED HEALTH CARE EDUCATION/TRAINING PROGRAM

## 2023-12-28 PROCEDURE — 3044F HG A1C LEVEL LT 7.0%: CPT | Mod: CPTII,S$GLB,, | Performed by: STUDENT IN AN ORGANIZED HEALTH CARE EDUCATION/TRAINING PROGRAM

## 2023-12-28 PROCEDURE — 99215 OFFICE O/P EST HI 40 MIN: CPT | Mod: S$GLB,,, | Performed by: STUDENT IN AN ORGANIZED HEALTH CARE EDUCATION/TRAINING PROGRAM

## 2023-12-28 PROCEDURE — 96367 TX/PROPH/DG ADDL SEQ IV INF: CPT

## 2023-12-28 PROCEDURE — A4216 STERILE WATER/SALINE, 10 ML: HCPCS | Performed by: STUDENT IN AN ORGANIZED HEALTH CARE EDUCATION/TRAINING PROGRAM

## 2023-12-28 PROCEDURE — 99999 PR PBB SHADOW E&M-EST. PATIENT-LVL IV: CPT | Mod: PBBFAC,,, | Performed by: STUDENT IN AN ORGANIZED HEALTH CARE EDUCATION/TRAINING PROGRAM

## 2023-12-28 PROCEDURE — 99999 PR PBB SHADOW E&M-EST. PATIENT-LVL IV: ICD-10-PCS | Mod: PBBFAC,,, | Performed by: STUDENT IN AN ORGANIZED HEALTH CARE EDUCATION/TRAINING PROGRAM

## 2023-12-28 PROCEDURE — 99215 PR OFFICE/OUTPT VISIT, EST, LEVL V, 40-54 MIN: ICD-10-PCS | Mod: S$GLB,,, | Performed by: STUDENT IN AN ORGANIZED HEALTH CARE EDUCATION/TRAINING PROGRAM

## 2023-12-28 PROCEDURE — 63600175 PHARM REV CODE 636 W HCPCS: Performed by: STUDENT IN AN ORGANIZED HEALTH CARE EDUCATION/TRAINING PROGRAM

## 2023-12-28 PROCEDURE — 96413 CHEMO IV INFUSION 1 HR: CPT

## 2023-12-28 PROCEDURE — 96375 TX/PRO/DX INJ NEW DRUG ADDON: CPT

## 2023-12-28 PROCEDURE — 3044F PR MOST RECENT HEMOGLOBIN A1C LEVEL <7.0%: ICD-10-PCS | Mod: CPTII,S$GLB,, | Performed by: STUDENT IN AN ORGANIZED HEALTH CARE EDUCATION/TRAINING PROGRAM

## 2023-12-28 PROCEDURE — 25000003 PHARM REV CODE 250: Performed by: STUDENT IN AN ORGANIZED HEALTH CARE EDUCATION/TRAINING PROGRAM

## 2023-12-28 PROCEDURE — 3008F BODY MASS INDEX DOCD: CPT | Mod: CPTII,S$GLB,, | Performed by: STUDENT IN AN ORGANIZED HEALTH CARE EDUCATION/TRAINING PROGRAM

## 2023-12-28 PROCEDURE — 3080F PR MOST RECENT DIASTOLIC BLOOD PRESSURE >= 90 MM HG: ICD-10-PCS | Mod: CPTII,S$GLB,, | Performed by: STUDENT IN AN ORGANIZED HEALTH CARE EDUCATION/TRAINING PROGRAM

## 2023-12-28 RX ORDER — DIPHENHYDRAMINE HYDROCHLORIDE 50 MG/ML
50 INJECTION INTRAMUSCULAR; INTRAVENOUS ONCE AS NEEDED
Status: DISCONTINUED | OUTPATIENT
Start: 2023-12-28 | End: 2023-12-28 | Stop reason: HOSPADM

## 2023-12-28 RX ORDER — HEPARIN 100 UNIT/ML
500 SYRINGE INTRAVENOUS
Status: CANCELLED | OUTPATIENT
Start: 2023-12-28

## 2023-12-28 RX ORDER — SODIUM CHLORIDE 0.9 % (FLUSH) 0.9 %
10 SYRINGE (ML) INJECTION
Status: CANCELLED | OUTPATIENT
Start: 2023-12-28

## 2023-12-28 RX ORDER — HEPARIN 100 UNIT/ML
500 SYRINGE INTRAVENOUS
Status: DISCONTINUED | OUTPATIENT
Start: 2023-12-28 | End: 2023-12-28 | Stop reason: HOSPADM

## 2023-12-28 RX ORDER — DIPHENHYDRAMINE HYDROCHLORIDE 50 MG/ML
50 INJECTION INTRAMUSCULAR; INTRAVENOUS ONCE AS NEEDED
Status: CANCELLED | OUTPATIENT
Start: 2023-12-28

## 2023-12-28 RX ORDER — PROCHLORPERAZINE EDISYLATE 5 MG/ML
5 INJECTION INTRAMUSCULAR; INTRAVENOUS ONCE AS NEEDED
Status: CANCELLED
Start: 2023-12-28

## 2023-12-28 RX ORDER — FAMOTIDINE 10 MG/ML
20 INJECTION INTRAVENOUS
Status: CANCELLED | OUTPATIENT
Start: 2023-12-28

## 2023-12-28 RX ORDER — PROCHLORPERAZINE EDISYLATE 5 MG/ML
5 INJECTION INTRAMUSCULAR; INTRAVENOUS ONCE AS NEEDED
Status: DISCONTINUED | OUTPATIENT
Start: 2023-12-28 | End: 2023-12-28 | Stop reason: HOSPADM

## 2023-12-28 RX ORDER — EPINEPHRINE 0.3 MG/.3ML
0.3 INJECTION SUBCUTANEOUS ONCE AS NEEDED
Status: CANCELLED | OUTPATIENT
Start: 2023-12-28

## 2023-12-28 RX ORDER — EPINEPHRINE 0.3 MG/.3ML
0.3 INJECTION SUBCUTANEOUS ONCE AS NEEDED
Status: DISCONTINUED | OUTPATIENT
Start: 2023-12-28 | End: 2023-12-28 | Stop reason: HOSPADM

## 2023-12-28 RX ORDER — FAMOTIDINE 10 MG/ML
20 INJECTION INTRAVENOUS
Status: COMPLETED | OUTPATIENT
Start: 2023-12-28 | End: 2023-12-28

## 2023-12-28 RX ORDER — SODIUM CHLORIDE 0.9 % (FLUSH) 0.9 %
10 SYRINGE (ML) INJECTION
Status: DISCONTINUED | OUTPATIENT
Start: 2023-12-28 | End: 2023-12-28 | Stop reason: HOSPADM

## 2023-12-28 RX ADMIN — DIPHENHYDRAMINE HYDROCHLORIDE 50 MG: 50 INJECTION, SOLUTION INTRAMUSCULAR; INTRAVENOUS at 09:12

## 2023-12-28 RX ADMIN — Medication 10 ML: at 11:12

## 2023-12-28 RX ADMIN — HEPARIN 500 UNITS: 100 SYRINGE at 11:12

## 2023-12-28 RX ADMIN — PACLITAXEL 120 MG: 6 INJECTION, SOLUTION, CONCENTRATE INTRAVENOUS at 10:12

## 2023-12-28 RX ADMIN — FAMOTIDINE 20 MG: 10 INJECTION INTRAVENOUS at 09:12

## 2023-12-28 RX ADMIN — SODIUM CHLORIDE: 9 INJECTION, SOLUTION INTRAVENOUS at 08:12

## 2023-12-28 NOTE — PLAN OF CARE
Pt tolerated Taxol well. No adverse reaction noted. Pt education reinforced on chemo regimen, side effects, what to expect, and when to call . Pt verbalized understanding. I reviewed pt calendar w/ pt and understanding verbalized.

## 2024-01-04 ENCOUNTER — INFUSION (OUTPATIENT)
Dept: INFUSION THERAPY | Facility: HOSPITAL | Age: 54
End: 2024-01-04
Payer: COMMERCIAL

## 2024-01-04 VITALS
SYSTOLIC BLOOD PRESSURE: 130 MMHG | RESPIRATION RATE: 18 BRPM | TEMPERATURE: 98 F | DIASTOLIC BLOOD PRESSURE: 86 MMHG | WEIGHT: 180.25 LBS | BODY MASS INDEX: 29.09 KG/M2 | HEART RATE: 67 BPM

## 2024-01-04 DIAGNOSIS — C50.912 INFILTRATING DUCTAL CARCINOMA OF LEFT BREAST: Primary | ICD-10-CM

## 2024-01-04 PROCEDURE — 96375 TX/PRO/DX INJ NEW DRUG ADDON: CPT

## 2024-01-04 PROCEDURE — 25000003 PHARM REV CODE 250: Performed by: STUDENT IN AN ORGANIZED HEALTH CARE EDUCATION/TRAINING PROGRAM

## 2024-01-04 PROCEDURE — 96413 CHEMO IV INFUSION 1 HR: CPT

## 2024-01-04 PROCEDURE — 63600175 PHARM REV CODE 636 W HCPCS: Performed by: STUDENT IN AN ORGANIZED HEALTH CARE EDUCATION/TRAINING PROGRAM

## 2024-01-04 PROCEDURE — 96367 TX/PROPH/DG ADDL SEQ IV INF: CPT

## 2024-01-04 RX ORDER — SODIUM CHLORIDE 0.9 % (FLUSH) 0.9 %
10 SYRINGE (ML) INJECTION
Status: CANCELLED | OUTPATIENT
Start: 2024-01-04

## 2024-01-04 RX ORDER — DIPHENHYDRAMINE HYDROCHLORIDE 50 MG/ML
50 INJECTION, SOLUTION INTRAMUSCULAR; INTRAVENOUS ONCE AS NEEDED
Status: DISCONTINUED | OUTPATIENT
Start: 2024-01-04 | End: 2024-01-04 | Stop reason: HOSPADM

## 2024-01-04 RX ORDER — PROCHLORPERAZINE EDISYLATE 5 MG/ML
5 INJECTION INTRAMUSCULAR; INTRAVENOUS ONCE AS NEEDED
Status: CANCELLED
Start: 2024-01-04

## 2024-01-04 RX ORDER — HEPARIN 100 UNIT/ML
500 SYRINGE INTRAVENOUS
Status: DISCONTINUED | OUTPATIENT
Start: 2024-01-04 | End: 2024-01-04 | Stop reason: HOSPADM

## 2024-01-04 RX ORDER — HEPARIN 100 UNIT/ML
500 SYRINGE INTRAVENOUS
Status: CANCELLED | OUTPATIENT
Start: 2024-01-04

## 2024-01-04 RX ORDER — FAMOTIDINE 10 MG/ML
20 INJECTION INTRAVENOUS
Status: COMPLETED | OUTPATIENT
Start: 2024-01-04 | End: 2024-01-04

## 2024-01-04 RX ORDER — FAMOTIDINE 10 MG/ML
20 INJECTION INTRAVENOUS
Status: CANCELLED | OUTPATIENT
Start: 2024-01-04

## 2024-01-04 RX ORDER — SODIUM CHLORIDE 0.9 % (FLUSH) 0.9 %
10 SYRINGE (ML) INJECTION
Status: DISCONTINUED | OUTPATIENT
Start: 2024-01-04 | End: 2024-01-04 | Stop reason: HOSPADM

## 2024-01-04 RX ORDER — PROCHLORPERAZINE EDISYLATE 5 MG/ML
5 INJECTION INTRAMUSCULAR; INTRAVENOUS ONCE AS NEEDED
Status: DISCONTINUED | OUTPATIENT
Start: 2024-01-04 | End: 2024-01-04 | Stop reason: HOSPADM

## 2024-01-04 RX ORDER — DIPHENHYDRAMINE HYDROCHLORIDE 50 MG/ML
50 INJECTION, SOLUTION INTRAMUSCULAR; INTRAVENOUS ONCE AS NEEDED
Status: CANCELLED | OUTPATIENT
Start: 2024-01-04

## 2024-01-04 RX ORDER — EPINEPHRINE 0.3 MG/.3ML
0.3 INJECTION SUBCUTANEOUS ONCE AS NEEDED
Status: CANCELLED | OUTPATIENT
Start: 2024-01-04

## 2024-01-04 RX ORDER — EPINEPHRINE 0.3 MG/.3ML
0.3 INJECTION SUBCUTANEOUS ONCE AS NEEDED
Status: DISCONTINUED | OUTPATIENT
Start: 2024-01-04 | End: 2024-01-04 | Stop reason: HOSPADM

## 2024-01-04 RX ADMIN — SODIUM CHLORIDE: 9 INJECTION, SOLUTION INTRAVENOUS at 10:01

## 2024-01-04 RX ADMIN — PACLITAXEL 120 MG: 6 INJECTION, SOLUTION, CONCENTRATE INTRAVENOUS at 12:01

## 2024-01-04 RX ADMIN — DIPHENHYDRAMINE HYDROCHLORIDE 50 MG: 50 INJECTION, SOLUTION INTRAMUSCULAR; INTRAVENOUS at 11:01

## 2024-01-04 RX ADMIN — DEXAMETHASONE SODIUM PHOSPHATE 10 MG: 4 INJECTION, SOLUTION INTRA-ARTICULAR; INTRALESIONAL; INTRAMUSCULAR; INTRAVENOUS; SOFT TISSUE at 11:01

## 2024-01-04 RX ADMIN — FAMOTIDINE 20 MG: 10 INJECTION INTRAVENOUS at 11:01

## 2024-01-04 NOTE — PLAN OF CARE
Pt received C15 of taxol via R chest port without adverse effects. VS remained stable throughout the tx. Pt discharged AAOx4 and ambulatory.

## 2024-01-05 NOTE — PROGRESS NOTES
[facility]/ [facility]      Chief Complaint:   TNBC     Cancer Staging   Invasive ductal carcinoma of breast  Staging form: Breast, AJCC 8th Edition  - Clinical stage from 2023: Stage IB (cT1c, cN0, cM0, G3, ER-, NH-, HER2-) - Signed by Sonia Johnson MD on 2023    HPI:  Teresa Valdes is a 53 y.o. female who presents today for evaluation of newly diagnosed breast cancer. Her oncologic history is as follows:    -screening MMG 23 showing an asymmetry in the central L breast. Diagnostic MMG/US revealing a 19 x 12 x17mm complex cystic and solid mass seen in the L breast at 11oclock. No left axillary LAD   -23 biopsy confirming IDC, grade 3. ER negative, NH negative, Her2 2+, negative FISH. Ki67 80%  -23 MRI breast showed 1.6 x 1.0 x 1.2 cm left breast mass with no associated lymphadenopathy.  -2023 started na ddAC-T    Gyn History:   Menarche: 14  Menopause: 46    Age at first pregnancy: 18  : Yes, with second child    Interval History:  Teresa presents today prior to C16D1 naddAC-T.  Tolerating therapy fairly well. Appetite is good. Noes her nails are a mess. Bowel movements are good. Notes ongoing nausea every other day, takes zofran PRN with relief. She also reports stable neuropathy- now in bilateral toes (worse) as well as fingertips. Otherwise she is feeling well; looking forward to completing treatment. Denies fever, cough, sob, diarrhea or mouth sores.       Patient Active Problem List   Diagnosis    Encounter for colorectal cancer screening    Anxiety and depression    Overweight (BMI 25.0-29.9)    Need for hepatitis C screening test    Need for shingles vaccine    Bunion of great toe of left foot    Laceration of skin of forehead    Invasive ductal carcinoma of breast    Malignant neoplasm of upper-inner quadrant of left breast in female, estrogen receptor negative       Current Outpatient  Medications   Medication Instructions    dexAMETHasone (DECADRON) 4 MG Tab Take 8mg (2 pills) po daily on days 2-4 of chemotherapy. Only for cycles 1-4    duke's soln (benadryl 30 mL, mylanta 30 mL, LIDOcaine 30 mL, nystatin 30 mL) 120mL 10 mLs, Oral, 4 times daily    ergocalciferol (ERGOCALCIFEROL) 50,000 Units, Oral, Every 7 days    hydrocortisone (ANUSOL-HC) 2.5 % rectal cream Rectal, 2 times daily    LIDOcaine-prilocaine (EMLA) cream Topical (Top), As needed (PRN)    multivitamin capsule 1 capsule, Oral, Daily    OLANZapine (ZYPREXA) 5 MG tablet Take 5mg nightly on days 1-4 of chemotherapy    ondansetron (ZOFRAN) 8 mg, Oral, Every 12 hours PRN    promethazine (PHENERGAN) 12.5 mg, Oral, Every 4 hours PRN    venlafaxine (EFFEXOR) 37.5 mg, Oral, Daily     Review of Systems:       PHYSICAL EXAM:  LMP 10/15/2018 (Within Weeks)     ECOG 1  General: well appearing, in no apparent distress  HEENT: Normocephalic, EOMI, anicteric sclerae, MMM  Neurologic: Alert and oriented x 4, normal speech and gait   Psychiatric: Conversing appropriately with providers throughout today's encounter.    Limited due to virtual visit     Reviewed all recent labs, imaging and pathology.  WBC 1.91, , Hgb 11.3, Plt 174  Chem wnl     Assessment & Plan:  Teresa is a dimitry 54yo woman with recently diagnosed cT1cN0 TNBC.     We previously reviewed the natural history of TNBC; given disease <2cm would favor proceeding with naddAC-T. Previously discussed dosing, logistic, supportive medications and potential side effects.    Continues to tolerate therapy fairly well, although treatment course c/b neutropenia and neuropathy.     #TNBC:  --given ongoing difficulty with neutropenia and now worsening neuropathy, will DR by 20% at cycle 15  --labs reviewed hold x 1 week   -- Will receive Neupogen 300 mcg x 3 days after next 2 cycles  --echo 12/18/23 with stable EF 55-60%  --RTC in 2 weeks for follow up     #Neutropenia: 2/2  chemotherapy.  --most recent anc 800,  --counseled on fever, s/s of infection    #Fatigue: chemotherapy-induced, suspect due to cumulative treatment effect  --encouraged her to push po fluids, optimize nutrition    #Neuropathy: bilateral fingertips and now toes  --plan for DR as above  --encouraged to cont using cold socks/gloves  --not interested in acupuncture at this time  --will cont to monitor this closely     #Fingernail changes: 2/2 chemo  --cont to monitor, non-painful for now and no s/s of infection    #Mucositis: resolved  --encouraged to use baking soda rinse and [employer]'s soln. Anticipate these may improve on paclitaxel    #Hemorrhoid: improved  --saw colorectal and was given a cream    #Tobacco use disorder:  --1/2 ppd x 20 years; working to cut down on her own- now down to 2 cigarettes qod      #Chemotherapy induced nausea: stable   --cont trial of taking a preventative zofran in the am  --continue phenergan as needed as this does help    All questions were answered to her apparent satisfaction. Will see her back in 2 weeks or sooner should the need arise.     Return to clinic in 2 weeks with MD appointment and labs.   Patient is in agreement with the proposed treatment plan. All questions were answered to the patient's satisfaction. Patient knows to call clinic for any new or worsening symptoms and if anything is needed before the next clinic visit.          Moraima Alvarado, FNP-C  Hematology & Medical Oncology   [address] [place] [address]  ph. [phone]  Fax. [phone]    Collaborating physician, Dr. Johnson.    Approximately 7 minutes were spent face-to-face with the patient.  Approximately 15 minutes in total were spent on this encounter, which includes face-to-face time and non-face-to-face time preparing to see the patient (e.g., review of tests), obtaining and/or reviewing separately obtained history, documenting clinical information in the electronic or other health  record, independently interpreting results (not separately reported) and communicating results to the patient/family/caregiver, or care coordination (not separately reported).          Route Chart for Scheduling    Med Onc Chart Routing      Follow up with physician    Follow up with DARIANA    Infusion scheduling note   she needs 1 mre infusion on 2/1   Injection scheduling note lisa at [facility] message sent to their infuion team   Labs CBC and CMP   Scheduling:  Preferred lab:  Lab interval:  on 1/31 at [facility]   Imaging    Pharmacy appointment    Other referrals                Treatment Plan Information   OP BREAST DOSE-DENSE AC-T (DOXORUBICIN CYCLOPHOSPHAMIDE Q2W FOLLOWED BY PACLITAXEL WEEKLY)   Sonia Johnson MD   Upcoming Treatment Dates - OP BREAST DOSE-DENSE AC-T (DOXORUBICIN CYCLOPHOSPHAMIDE Q2W FOLLOWED BY PACLITAXEL WEEKLY)    1/5/2024       Pre-Medications       diphenhydrAMINE (BENADRYL) 50 mg in NS 50 mL IVPB       dexAMETHasone (DECADRON) 10 mg in sodium chloride 0.9% 50 mL IVPB       famotidine (PF) injection 20 mg       Chemotherapy       PACLitaxeL (TAXOL) 65 mg/m2 = 120 mg in sodium chloride 0.9% 250 mL chemo infusion       Antiemetics       prochlorperazine injection Soln 5 mg  1/12/2024       Pre-Medications       diphenhydrAMINE (BENADRYL) 50 mg in NS 50 mL IVPB       dexAMETHasone (DECADRON) 10 mg in sodium chloride 0.9% 50 mL IVPB       famotidine (PF) injection 20 mg       Chemotherapy       PACLitaxeL (TAXOL) 65 mg/m2 = 120 mg in sodium chloride 0.9% 250 mL chemo infusion       Antiemetics       prochlorperazine injection Soln 5 mg  1/19/2024       Pre-Medications       diphenhydrAMINE (BENADRYL) 50 mg in NS 50 mL IVPB       dexAMETHasone (DECADRON) 10 mg in sodium chloride 0.9% 50 mL IVPB       famotidine (PF) injection 20 mg       Chemotherapy       PACLitaxeL (TAXOL) 65 mg/m2 = 120 mg in sodium chloride 0.9% 250 mL chemo infusion       Antiemetics       prochlorperazine  injection Soln 5 mg    Supportive Plan Information  OP FILGRASTIM 300 MCG   Moraima SEileen Alvarado, OSVALDO   Upcoming Treatment Dates - OP FILGRASTIM 300 MCG    1/19/2024       Medications       filgrastim-sndz (ZARXIO) injection 300 mcg/0.5 mL (Preferred Regimen)  1/22/2024       Medications       filgrastim-sndz (ZARXIO) injection 300 mcg/0.5 mL (Preferred Regimen)  1/23/2024       Medications       filgrastim-sndz (ZARXIO) injection 300 mcg/0.5 mL (Preferred Regimen)  1/26/2024       Medications       filgrastim-sndz (ZARXIO) injection 300 mcg/0.5 mL (Preferred Regimen)    The patient location is: her care  The chief complaint leading to consultation is: breast cancer    Visit type: audiovisual    Face to Face time with patient: 7  15 minutes of total time spent on the encounter, which includes face to face time and non-face to face time preparing to see the patient (eg, review of tests), Obtaining and/or reviewing separately obtained history, Documenting clinical information in the electronic or other health record, Independently interpreting results (not separately reported) and communicating results to the patient/family/caregiver, or Care coordination (not separately reported).         Each patient to whom he or she provides medical services by telemedicine is:  (1) informed of the relationship between the physician and patient and the respective role of any other health care provider with respect to management of the patient; and (2) notified that he or she may decline to receive medical services by telemedicine and may withdraw from such care at any time.    Notes: see above

## 2024-01-10 ENCOUNTER — DOCUMENTATION ONLY (OUTPATIENT)
Dept: HEMATOLOGY/ONCOLOGY | Facility: CLINIC | Age: 54
End: 2024-01-10
Payer: COMMERCIAL

## 2024-01-10 ENCOUNTER — OFFICE VISIT (OUTPATIENT)
Dept: HEMATOLOGY/ONCOLOGY | Facility: CLINIC | Age: 54
End: 2024-01-10
Payer: COMMERCIAL

## 2024-01-10 DIAGNOSIS — Z17.1 MALIGNANT NEOPLASM OF UPPER-INNER QUADRANT OF LEFT BREAST IN FEMALE, ESTROGEN RECEPTOR NEGATIVE: ICD-10-CM

## 2024-01-10 DIAGNOSIS — E66.3 OVERWEIGHT (BMI 25.0-29.9): ICD-10-CM

## 2024-01-10 DIAGNOSIS — C50.912 INFILTRATING DUCTAL CARCINOMA OF LEFT BREAST: Primary | ICD-10-CM

## 2024-01-10 DIAGNOSIS — C50.212 MALIGNANT NEOPLASM OF UPPER-INNER QUADRANT OF LEFT BREAST IN FEMALE, ESTROGEN RECEPTOR NEGATIVE: ICD-10-CM

## 2024-01-10 PROCEDURE — 1160F RVW MEDS BY RX/DR IN RCRD: CPT | Mod: CPTII,95,, | Performed by: NURSE PRACTITIONER

## 2024-01-10 PROCEDURE — 1159F MED LIST DOCD IN RCRD: CPT | Mod: CPTII,95,, | Performed by: NURSE PRACTITIONER

## 2024-01-10 PROCEDURE — 99215 OFFICE O/P EST HI 40 MIN: CPT | Mod: 95,,, | Performed by: NURSE PRACTITIONER

## 2024-01-11 ENCOUNTER — PATIENT MESSAGE (OUTPATIENT)
Dept: HEMATOLOGY/ONCOLOGY | Facility: CLINIC | Age: 54
End: 2024-01-11
Payer: COMMERCIAL

## 2024-01-12 ENCOUNTER — PATIENT MESSAGE (OUTPATIENT)
Dept: HEMATOLOGY/ONCOLOGY | Facility: CLINIC | Age: 54
End: 2024-01-12
Payer: COMMERCIAL

## 2024-01-18 ENCOUNTER — INFUSION (OUTPATIENT)
Dept: INFUSION THERAPY | Facility: HOSPITAL | Age: 54
End: 2024-01-18
Payer: COMMERCIAL

## 2024-01-18 VITALS
HEART RATE: 75 BPM | TEMPERATURE: 99 F | WEIGHT: 184.06 LBS | HEIGHT: 66 IN | RESPIRATION RATE: 18 BRPM | SYSTOLIC BLOOD PRESSURE: 146 MMHG | DIASTOLIC BLOOD PRESSURE: 89 MMHG | OXYGEN SATURATION: 99 % | BODY MASS INDEX: 29.58 KG/M2

## 2024-01-18 DIAGNOSIS — C50.912 INFILTRATING DUCTAL CARCINOMA OF LEFT BREAST: Primary | ICD-10-CM

## 2024-01-18 PROCEDURE — 96413 CHEMO IV INFUSION 1 HR: CPT

## 2024-01-18 PROCEDURE — 96367 TX/PROPH/DG ADDL SEQ IV INF: CPT

## 2024-01-18 PROCEDURE — A4216 STERILE WATER/SALINE, 10 ML: HCPCS | Performed by: STUDENT IN AN ORGANIZED HEALTH CARE EDUCATION/TRAINING PROGRAM

## 2024-01-18 PROCEDURE — 96375 TX/PRO/DX INJ NEW DRUG ADDON: CPT

## 2024-01-18 PROCEDURE — 25000003 PHARM REV CODE 250: Performed by: STUDENT IN AN ORGANIZED HEALTH CARE EDUCATION/TRAINING PROGRAM

## 2024-01-18 PROCEDURE — 63600175 PHARM REV CODE 636 W HCPCS: Performed by: STUDENT IN AN ORGANIZED HEALTH CARE EDUCATION/TRAINING PROGRAM

## 2024-01-18 RX ORDER — DIPHENHYDRAMINE HYDROCHLORIDE 50 MG/ML
50 INJECTION INTRAMUSCULAR; INTRAVENOUS ONCE AS NEEDED
Status: CANCELLED | OUTPATIENT
Start: 2024-01-18

## 2024-01-18 RX ORDER — HEPARIN 100 UNIT/ML
500 SYRINGE INTRAVENOUS
Status: DISCONTINUED | OUTPATIENT
Start: 2024-01-18 | End: 2024-01-18 | Stop reason: HOSPADM

## 2024-01-18 RX ORDER — SODIUM CHLORIDE 0.9 % (FLUSH) 0.9 %
10 SYRINGE (ML) INJECTION
Status: DISCONTINUED | OUTPATIENT
Start: 2024-01-18 | End: 2024-01-18 | Stop reason: HOSPADM

## 2024-01-18 RX ORDER — HEPARIN 100 UNIT/ML
500 SYRINGE INTRAVENOUS
Status: CANCELLED | OUTPATIENT
Start: 2024-01-18

## 2024-01-18 RX ORDER — SODIUM CHLORIDE 0.9 % (FLUSH) 0.9 %
10 SYRINGE (ML) INJECTION
Status: CANCELLED | OUTPATIENT
Start: 2024-01-18

## 2024-01-18 RX ORDER — FAMOTIDINE 10 MG/ML
20 INJECTION INTRAVENOUS
Status: COMPLETED | OUTPATIENT
Start: 2024-01-18 | End: 2024-01-18

## 2024-01-18 RX ORDER — PROCHLORPERAZINE EDISYLATE 5 MG/ML
5 INJECTION INTRAMUSCULAR; INTRAVENOUS ONCE AS NEEDED
Status: DISCONTINUED | OUTPATIENT
Start: 2024-01-18 | End: 2024-01-18 | Stop reason: HOSPADM

## 2024-01-18 RX ORDER — PROCHLORPERAZINE EDISYLATE 5 MG/ML
5 INJECTION INTRAMUSCULAR; INTRAVENOUS ONCE AS NEEDED
Status: CANCELLED
Start: 2024-01-18

## 2024-01-18 RX ORDER — FAMOTIDINE 10 MG/ML
20 INJECTION INTRAVENOUS
Status: CANCELLED | OUTPATIENT
Start: 2024-01-18

## 2024-01-18 RX ORDER — EPINEPHRINE 0.3 MG/.3ML
0.3 INJECTION SUBCUTANEOUS ONCE AS NEEDED
Status: CANCELLED | OUTPATIENT
Start: 2024-01-18

## 2024-01-18 RX ORDER — EPINEPHRINE 0.3 MG/.3ML
0.3 INJECTION SUBCUTANEOUS ONCE AS NEEDED
Status: DISCONTINUED | OUTPATIENT
Start: 2024-01-18 | End: 2024-01-18 | Stop reason: HOSPADM

## 2024-01-18 RX ORDER — DIPHENHYDRAMINE HYDROCHLORIDE 50 MG/ML
50 INJECTION INTRAMUSCULAR; INTRAVENOUS ONCE AS NEEDED
Status: DISCONTINUED | OUTPATIENT
Start: 2024-01-18 | End: 2024-01-18 | Stop reason: HOSPADM

## 2024-01-18 RX ADMIN — DEXAMETHASONE SODIUM PHOSPHATE 10 MG: 4 INJECTION, SOLUTION INTRA-ARTICULAR; INTRALESIONAL; INTRAMUSCULAR; INTRAVENOUS; SOFT TISSUE at 10:01

## 2024-01-18 RX ADMIN — SODIUM CHLORIDE: 9 INJECTION, SOLUTION INTRAVENOUS at 10:01

## 2024-01-18 RX ADMIN — PACLITAXEL 120 MG: 6 INJECTION, SOLUTION INTRAVENOUS at 11:01

## 2024-01-18 RX ADMIN — FAMOTIDINE 20 MG: 10 INJECTION INTRAVENOUS at 10:01

## 2024-01-18 RX ADMIN — DIPHENHYDRAMINE HYDROCHLORIDE 50 MG: 50 INJECTION, SOLUTION INTRAMUSCULAR; INTRAVENOUS at 11:01

## 2024-01-18 RX ADMIN — HEPARIN 500 UNITS: 100 SYRINGE at 12:01

## 2024-01-18 RX ADMIN — Medication 10 ML: at 12:01

## 2024-01-18 NOTE — PLAN OF CARE
1245-Pt tolerated Taxol infusion well, no complications or side effects, POC and meds discussed with pt, pt aware of upcoming appts, pt knows to call MD with any questions or concerns. Pt ambulated off unit, no distress noted.

## 2024-01-25 ENCOUNTER — INFUSION (OUTPATIENT)
Dept: INFUSION THERAPY | Facility: HOSPITAL | Age: 54
End: 2024-01-25
Payer: COMMERCIAL

## 2024-01-25 ENCOUNTER — OFFICE VISIT (OUTPATIENT)
Dept: HEMATOLOGY/ONCOLOGY | Facility: CLINIC | Age: 54
End: 2024-01-25
Payer: COMMERCIAL

## 2024-01-25 VITALS
DIASTOLIC BLOOD PRESSURE: 76 MMHG | HEART RATE: 74 BPM | WEIGHT: 184.88 LBS | SYSTOLIC BLOOD PRESSURE: 122 MMHG | HEIGHT: 66 IN | BODY MASS INDEX: 29.71 KG/M2

## 2024-01-25 VITALS
SYSTOLIC BLOOD PRESSURE: 117 MMHG | OXYGEN SATURATION: 99 % | RESPIRATION RATE: 20 BRPM | HEIGHT: 66 IN | HEART RATE: 71 BPM | BODY MASS INDEX: 29.71 KG/M2 | WEIGHT: 184.88 LBS | DIASTOLIC BLOOD PRESSURE: 79 MMHG

## 2024-01-25 DIAGNOSIS — C50.912 INFILTRATING DUCTAL CARCINOMA OF LEFT BREAST: Primary | ICD-10-CM

## 2024-01-25 DIAGNOSIS — D70.1 CHEMOTHERAPY INDUCED NEUTROPENIA: ICD-10-CM

## 2024-01-25 DIAGNOSIS — F17.200 TOBACCO USE DISORDER: ICD-10-CM

## 2024-01-25 DIAGNOSIS — K64.5 THROMBOSED HEMORRHOIDS: ICD-10-CM

## 2024-01-25 DIAGNOSIS — Z17.1 MALIGNANT NEOPLASM OF UPPER-INNER QUADRANT OF LEFT BREAST IN FEMALE, ESTROGEN RECEPTOR NEGATIVE: Primary | ICD-10-CM

## 2024-01-25 DIAGNOSIS — K12.30 MUCOSITIS: ICD-10-CM

## 2024-01-25 DIAGNOSIS — C50.212 MALIGNANT NEOPLASM OF UPPER-INNER QUADRANT OF LEFT BREAST IN FEMALE, ESTROGEN RECEPTOR NEGATIVE: Primary | ICD-10-CM

## 2024-01-25 DIAGNOSIS — T45.1X5A CHEMOTHERAPY INDUCED NEUTROPENIA: ICD-10-CM

## 2024-01-25 DIAGNOSIS — R53.83 FATIGUE, UNSPECIFIED TYPE: ICD-10-CM

## 2024-01-25 DIAGNOSIS — Z12.39 ENCOUNTER FOR BREAST CANCER SCREENING OTHER THAN MAMMOGRAM: ICD-10-CM

## 2024-01-25 DIAGNOSIS — G62.9 NEUROPATHY: ICD-10-CM

## 2024-01-25 PROCEDURE — 3008F BODY MASS INDEX DOCD: CPT | Mod: CPTII,S$GLB,, | Performed by: STUDENT IN AN ORGANIZED HEALTH CARE EDUCATION/TRAINING PROGRAM

## 2024-01-25 PROCEDURE — 3078F DIAST BP <80 MM HG: CPT | Mod: CPTII,S$GLB,, | Performed by: STUDENT IN AN ORGANIZED HEALTH CARE EDUCATION/TRAINING PROGRAM

## 2024-01-25 PROCEDURE — 96375 TX/PRO/DX INJ NEW DRUG ADDON: CPT

## 2024-01-25 PROCEDURE — 99215 OFFICE O/P EST HI 40 MIN: CPT | Mod: S$GLB,,, | Performed by: STUDENT IN AN ORGANIZED HEALTH CARE EDUCATION/TRAINING PROGRAM

## 2024-01-25 PROCEDURE — 1159F MED LIST DOCD IN RCRD: CPT | Mod: CPTII,S$GLB,, | Performed by: STUDENT IN AN ORGANIZED HEALTH CARE EDUCATION/TRAINING PROGRAM

## 2024-01-25 PROCEDURE — 96413 CHEMO IV INFUSION 1 HR: CPT

## 2024-01-25 PROCEDURE — 3074F SYST BP LT 130 MM HG: CPT | Mod: CPTII,S$GLB,, | Performed by: STUDENT IN AN ORGANIZED HEALTH CARE EDUCATION/TRAINING PROGRAM

## 2024-01-25 PROCEDURE — 96367 TX/PROPH/DG ADDL SEQ IV INF: CPT

## 2024-01-25 PROCEDURE — 25000003 PHARM REV CODE 250: Performed by: STUDENT IN AN ORGANIZED HEALTH CARE EDUCATION/TRAINING PROGRAM

## 2024-01-25 PROCEDURE — 63600175 PHARM REV CODE 636 W HCPCS: Performed by: STUDENT IN AN ORGANIZED HEALTH CARE EDUCATION/TRAINING PROGRAM

## 2024-01-25 PROCEDURE — A4216 STERILE WATER/SALINE, 10 ML: HCPCS | Performed by: STUDENT IN AN ORGANIZED HEALTH CARE EDUCATION/TRAINING PROGRAM

## 2024-01-25 PROCEDURE — 99999 PR PBB SHADOW E&M-EST. PATIENT-LVL III: CPT | Mod: PBBFAC,,, | Performed by: STUDENT IN AN ORGANIZED HEALTH CARE EDUCATION/TRAINING PROGRAM

## 2024-01-25 RX ORDER — SODIUM CHLORIDE 0.9 % (FLUSH) 0.9 %
10 SYRINGE (ML) INJECTION
Status: DISCONTINUED | OUTPATIENT
Start: 2024-01-25 | End: 2024-01-25 | Stop reason: HOSPADM

## 2024-01-25 RX ORDER — EPINEPHRINE 0.3 MG/.3ML
0.3 INJECTION SUBCUTANEOUS ONCE AS NEEDED
Status: CANCELLED | OUTPATIENT
Start: 2024-01-25

## 2024-01-25 RX ORDER — HEPARIN 100 UNIT/ML
500 SYRINGE INTRAVENOUS
Status: DISCONTINUED | OUTPATIENT
Start: 2024-01-25 | End: 2024-01-25 | Stop reason: HOSPADM

## 2024-01-25 RX ORDER — PROCHLORPERAZINE EDISYLATE 5 MG/ML
5 INJECTION INTRAMUSCULAR; INTRAVENOUS ONCE AS NEEDED
Status: CANCELLED
Start: 2024-01-25

## 2024-01-25 RX ORDER — FAMOTIDINE 10 MG/ML
20 INJECTION INTRAVENOUS
Status: COMPLETED | OUTPATIENT
Start: 2024-01-25 | End: 2024-01-25

## 2024-01-25 RX ORDER — PROCHLORPERAZINE EDISYLATE 5 MG/ML
5 INJECTION INTRAMUSCULAR; INTRAVENOUS ONCE AS NEEDED
Status: DISCONTINUED | OUTPATIENT
Start: 2024-01-25 | End: 2024-01-25 | Stop reason: HOSPADM

## 2024-01-25 RX ORDER — FAMOTIDINE 10 MG/ML
20 INJECTION INTRAVENOUS
Status: CANCELLED | OUTPATIENT
Start: 2024-01-25

## 2024-01-25 RX ORDER — SODIUM CHLORIDE 0.9 % (FLUSH) 0.9 %
10 SYRINGE (ML) INJECTION
Status: CANCELLED | OUTPATIENT
Start: 2024-01-25

## 2024-01-25 RX ORDER — HEPARIN 100 UNIT/ML
500 SYRINGE INTRAVENOUS
Status: CANCELLED | OUTPATIENT
Start: 2024-01-25

## 2024-01-25 RX ORDER — DIPHENHYDRAMINE HYDROCHLORIDE 50 MG/ML
50 INJECTION INTRAMUSCULAR; INTRAVENOUS ONCE AS NEEDED
Status: DISCONTINUED | OUTPATIENT
Start: 2024-01-25 | End: 2024-01-25 | Stop reason: HOSPADM

## 2024-01-25 RX ORDER — EPINEPHRINE 0.3 MG/.3ML
0.3 INJECTION SUBCUTANEOUS ONCE AS NEEDED
Status: DISCONTINUED | OUTPATIENT
Start: 2024-01-25 | End: 2024-01-25 | Stop reason: HOSPADM

## 2024-01-25 RX ORDER — DIPHENHYDRAMINE HYDROCHLORIDE 50 MG/ML
50 INJECTION INTRAMUSCULAR; INTRAVENOUS ONCE AS NEEDED
Status: CANCELLED | OUTPATIENT
Start: 2024-01-25

## 2024-01-25 RX ADMIN — HEPARIN 500 UNITS: 100 SYRINGE at 11:01

## 2024-01-25 RX ADMIN — DIPHENHYDRAMINE HYDROCHLORIDE 50 MG: 50 INJECTION, SOLUTION INTRAMUSCULAR; INTRAVENOUS at 09:01

## 2024-01-25 RX ADMIN — PACLITAXEL 120 MG: 6 INJECTION, SOLUTION INTRAVENOUS at 10:01

## 2024-01-25 RX ADMIN — DEXAMETHASONE SODIUM PHOSPHATE 10 MG: 4 INJECTION, SOLUTION INTRA-ARTICULAR; INTRALESIONAL; INTRAMUSCULAR; INTRAVENOUS; SOFT TISSUE at 10:01

## 2024-01-25 RX ADMIN — Medication 10 ML: at 11:01

## 2024-01-25 RX ADMIN — FAMOTIDINE 20 MG: 10 INJECTION INTRAVENOUS at 10:01

## 2024-01-25 RX ADMIN — SODIUM CHLORIDE: 9 INJECTION, SOLUTION INTRAVENOUS at 09:01

## 2024-01-25 NOTE — PROGRESS NOTES
[facility]/ [facility]      Chief Complaint:   TNBC     Cancer Staging   Invasive ductal carcinoma of breast  Staging form: Breast, AJCC 8th Edition  - Clinical stage from 2023: Stage IB (cT1c, cN0, cM0, G3, ER-, IN-, HER2-) - Signed by Sonia Johnson MD on 2023    HPI:  Teresa Valdes is a 53 y.o. female who presents today for evaluation of newly diagnosed breast cancer. Her oncologic history is as follows:    -screening MMG 23 showing an asymmetry in the central L breast. Diagnostic MMG/US revealing a 19 x 12 x17mm complex cystic and solid mass seen in the L breast at 11oclock. No left axillary LAD   -23 biopsy confirming IDC, grade 3. ER negative, IN negative, Her2 2+, negative FISH. Ki67 80%  -23 MRI breast showed 1.6 x 1.0 x 1.2 cm left breast mass with no associated lymphadenopathy.  -2023 started na ddAC-T    Gyn History:   Menarche: 14  Menopause: 46    Age at first pregnancy: 18  : Yes, with second child    Interval History:  Teresa presents today after completing - naddAC-T. She is recovering well. Her hair has started to grow back. She had more energy this morning. She is eating well.  She did meet with Dr. Sequeira yesterday. MRI looks great from 24 post chemotherapy. She is going to have a lumpectomy.   Bowel movements are okay, mild diarrhea occasionally.  Bilateral neuropathy in the hands and feet. Stable.   Denies nausea and vomiting. Still with nail bed changes, notes finger tips are very sensitive.   Denies mouth sores.       Patient Active Problem List   Diagnosis    Encounter for colorectal cancer screening    Anxiety and depression    Overweight (BMI 25.0-29.9)    Need for hepatitis C screening test    Need for shingles vaccine    Bunion of great toe of left foot    Laceration of skin of forehead    Invasive ductal carcinoma of breast    Malignant neoplasm of upper-inner quadrant of  left breast in female, estrogen receptor negative       Current Outpatient Medications   Medication Instructions    dexAMETHasone (DECADRON) 4 MG Tab Take 8mg (2 pills) po daily on days 2-4 of chemotherapy. Only for cycles 1-4    duke's soln (benadryl 30 mL, mylanta 30 mL, LIDOcaine 30 mL, nystatin 30 mL) 120mL 10 mLs, Oral, 4 times daily    ergocalciferol (ERGOCALCIFEROL) 50,000 Units, Oral, Every 7 days    hydrocortisone (ANUSOL-HC) 2.5 % rectal cream Rectal, 2 times daily    LIDOcaine-prilocaine (EMLA) cream Topical (Top), As needed (PRN)    multivitamin capsule 1 capsule, Oral, Daily    OLANZapine (ZYPREXA) 5 MG tablet Take 5mg nightly on days 1-4 of chemotherapy    ondansetron (ZOFRAN) 8 mg, Oral, Every 12 hours PRN    promethazine (PHENERGAN) 12.5 mg, Oral, Every 4 hours PRN    venlafaxine (EFFEXOR) 37.5 mg, Oral, Daily       Review of Systems:   +fatigue  12pt ROS negative except as above     PHYSICAL EXAM:      ECOG 0   General: well appearing, in no apparent distress  HEENT: Normocephalic, EOMI, anicteric sclerae, MMM  Neck: supple, without cervical or supraclavicular lymphadenopathy.  Heart: regular rate and rhythm, normal S1 and S2, no murmurs, gallops or rubs.  Lungs: Clear to auscultation bilaterally, no increased wob  Breast: port in place in Rt chest wall clean and dry; difficult to appreciate L breast mass   Abdomen: Soft, nontender, nondistended with normal bowel sounds. No hepatosplenomegaly.  Extremities: No LE edema or joint effusion  Skin: warm, well-perfused, no rash  Neurologic: Alert and oriented x 4, normal speech and gait   Psychiatric: Conversing appropriately with providers throughout today's encounter.        Assessment & Plan:  Teresa is a dimitry 52yo woman with recently diagnosed cT1cN0 TNBC.     We previously reviewed the natural history of TNBC; given disease <2cm would favor proceeding with naddAC-T. Previously discussed dosing, logistic, supportive medications and potential side  effects.    Continues to tolerate therapy fairly well, although treatment course c/b neutropenia and neuropathy. G-csf added x 3 days following last cycle.       #Leukocytosis: 2/2 growth factor. No fever or concerning s/s of infection  --completed     #TNBC:  --given ongoing difficulty with neutropenia and now worsening neuropathy, DR by 20% at cycle 15- cont for remaining cycles   -- Will receive Neupogen 300 mcg x 2 days after next 2 cycles  --echo 12/18/23 with stable EF 55-60%  --RTC in 2 weeks for follow up  --MRI post chemotherapy looks great  -- Already see surgery post chemo      #Bone pain: 2/2 growth factor  --encouraged claritin, NSAIDS prn and heating pad      #Neutropenia: 2/2 chemotherapy. Now improved   --completed    #Fatigue: chemotherapy-induced, suspect due to cumulative treatment effect  --encouraged her to push po fluids, optimize nutrition    #Neuropathy: bilateral fingertips and now toes  --plan for DR as above  --encouraged to cont using cold socks/gloves  --not interested in acupuncture at this time  --will cont to monitor this closely     #Fingernail changes: 2/2 chemo  --cont to monitor, non-painful for now and no s/s of infection    #Mucositis: resolved  --encouraged to use baking soda rinse and [employer]'s soln. Anticipate these may improve on paclitaxel    #Hemorrhoid: improved  --saw colorectal and was given a cream    #Tobacco use disorder:  --1/2 ppd x 20 years; working to cut down on her own- now down to 2 cigarettes qod      #Chemotherapy induced nausea: stable   --cont trial of taking a preventative zofran in the am  --continue phenergan as needed as this does help    All questions were answered to her apparent satisfaction. Will see her back in 6-8 weeks or sooner should the need arise.     Return to clinic in 1 month with MD appointment.     Patient is in agreement with the proposed treatment plan. All questions were answered to the patient's satisfaction. Patient knows to call  clinic for any new or worsening symptoms and if anything is needed before the next clinic visit.          Moraima Alvarado, FNP-C  Hematology & Medical Oncology   [facility] [place] [address]  ph. [phone]  Fax. [phone]    Collaborating physician, Dr. Salazar.    Approximately 15 minutes were spent face-to-face with the patient.  Approximately 25 minutes in total were spent on this encounter, which includes face-to-face time and non-face-to-face time preparing to see the patient (e.g., review of tests), obtaining and/or reviewing separately obtained history, documenting clinical information in the electronic or other health record, independently interpreting results (not separately reported) and communicating results to the patient/family/caregiver, or care coordination (not separately reported).        Route Chart for Scheduling    Med Onc Chart Routing      Follow up with physician . 6-8 weeks with Dr. Johnson   Follow up with DARIANA    Infusion scheduling note    Injection scheduling note    Labs    Imaging    Pharmacy appointment    Other referrals

## 2024-01-25 NOTE — Clinical Note
Hi team,  Teresa will finish her last cycle of chemo next week. Do you mind arranging for MRI and surg follow up please? Think she is planning for bilateral mastectomies. Thanks!

## 2024-01-25 NOTE — PLAN OF CARE
1155  Patient completed Taxol infusion, tolerated well.  Port deaccessed, flushed, blood return noted, heparin locked. RTC 1/26/24 for Zarxio injection, patient aware.  Patient ambulated off floor accompanied by .

## 2024-01-25 NOTE — PROGRESS NOTES
[facility]/ [facility]      Chief Complaint:   TNBC     Cancer Staging   Invasive ductal carcinoma of breast  Staging form: Breast, AJCC 8th Edition  - Clinical stage from 2023: Stage IB (cT1c, cN0, cM0, G3, ER-, GA-, HER2-) - Signed by Sonia Johnson MD on 2023    HPI:  Teresa Valdes is a 53 y.o. female who presents today for evaluation of newly diagnosed breast cancer. Her oncologic history is as follows:    -screening MMG 23 showing an asymmetry in the central L breast. Diagnostic MMG/US revealing a 19 x 12 x17mm complex cystic and solid mass seen in the L breast at 11oclock. No left axillary LAD   -23 biopsy confirming IDC, grade 3. ER negative, GA negative, Her2 2+, negative FISH. Ki67 80%  -23 MRI breast showed 1.6 x 1.0 x 1.2 cm left breast mass with no associated lymphadenopathy.  -2023 started na ddAC-T    Gyn History:   Menarche: 14  Menopause: 46    Age at first pregnancy: 18  : Yes, with second child    Interval History:  Teresa presents today prior to C15D1 naddAC-T. Continues to tolerate therapy fairly well, although has noticed cumulative fatigue. Reports pain in her mid-back which she thinks is related to growth factor injections; similar to what she experienced with AC.  Also has had ongoing nausea and nail changes. Denies mouth sores. Neuropathy is stable- in bilateral toes and fingertips. Otherwise she is feeling well; looking forward to completing treatment.       Patient Active Problem List   Diagnosis    Encounter for colorectal cancer screening    Anxiety and depression    Overweight (BMI 25.0-29.9)    Need for hepatitis C screening test    Need for shingles vaccine    Bunion of great toe of left foot    Laceration of skin of forehead    Invasive ductal carcinoma of breast    Malignant neoplasm of upper-inner quadrant of left breast in female, estrogen receptor negative       Current  "Outpatient Medications   Medication Instructions    dexAMETHasone (DECADRON) 4 MG Tab Take 8mg (2 pills) po daily on days 2-4 of chemotherapy. Only for cycles 1-4    duke's soln (benadryl 30 mL, mylanta 30 mL, LIDOcaine 30 mL, nystatin 30 mL) 120mL 10 mLs, Oral, 4 times daily    ergocalciferol (ERGOCALCIFEROL) 50,000 Units, Oral, Every 7 days    hydrocortisone (ANUSOL-HC) 2.5 % rectal cream Rectal, 2 times daily    LIDOcaine-prilocaine (EMLA) cream Topical (Top), As needed (PRN)    multivitamin capsule 1 capsule, Oral, Daily    OLANZapine (ZYPREXA) 5 MG tablet Take 5mg nightly on days 1-4 of chemotherapy    ondansetron (ZOFRAN) 8 mg, Oral, Every 12 hours PRN    promethazine (PHENERGAN) 12.5 mg, Oral, Every 4 hours PRN    venlafaxine (EFFEXOR) 37.5 mg, Oral, Daily       Review of Systems:   +fatigue  12pt ROS negative except as above     PHYSICAL EXAM:  /79   Pulse 71   Resp 20   Ht 5' 6" (1.676 m)   Wt 83.8 kg (184 lb 13.7 oz)   LMP 10/15/2018 (Within Weeks)   SpO2 99%   BMI 29.84 kg/m²     ECOG 0   General: well appearing, in no apparent distress  HEENT: Normocephalic, EOMI, anicteric sclerae, MMM  Neck: supple, without cervical or supraclavicular lymphadenopathy.  Heart: regular rate and rhythm, normal S1 and S2, no murmurs, gallops or rubs.  Lungs: Clear to auscultation bilaterally, no increased wob  Breast: port in place in Rt chest wall clean and dry; difficult to appreciate L breast mass   Abdomen: Soft, nontender, nondistended with normal bowel sounds. No hepatosplenomegaly.  Extremities: No LE edema or joint effusion  Skin: warm, well-perfused, no rash  Neurologic: Alert and oriented x 4, normal speech and gait   Psychiatric: Conversing appropriately with providers throughout today's encounter.    Limited due to virtual visit     Reviewed all recent labs, imaging and pathology.  WBC 19.4, Hgb 12.3, Plt 161  Chem wnl     Assessment & Plan:  Teresa is a dimitry 54yo woman with recently diagnosed " cT1cN0 TNBC.     We previously reviewed the natural history of TNBC; given disease <2cm would favor proceeding with naddAC-T. Previously discussed dosing, logistic, supportive medications and potential side effects.    Continues to tolerate therapy fairly well, although treatment course c/b neutropenia and neuropathy. G-csf added x 3 days following last cycle.       #Leukocytosis: 2/2 growth factor. No fever or concerning s/s of infection  --will decrease from 3 days to 2 days of growth factor w next cycle     #TNBC:  --given ongoing difficulty with neutropenia and now worsening neuropathy, DR by 20% at cycle 15- cont for remaining cycles   -- Will receive Neupogen 300 mcg x 2 days after next 2 cycles  --echo 12/18/23 with stable EF 55-60%  --RTC in 2 weeks for follow up  --will reach out to surgery to arrange MRI and surg appt      #Bone pain: 2/2 growth factor  --encouraged claritin, NSAIDS prn and heating pad      #Neutropenia: 2/2 chemotherapy. Now improved   --cont growth factor as above     #Fatigue: chemotherapy-induced, suspect due to cumulative treatment effect  --encouraged her to push po fluids, optimize nutrition    #Neuropathy: bilateral fingertips and now toes  --plan for DR as above  --encouraged to cont using cold socks/gloves  --not interested in acupuncture at this time  --will cont to monitor this closely     #Fingernail changes: 2/2 chemo  --cont to monitor, non-painful for now and no s/s of infection    #Mucositis: resolved  --encouraged to use baking soda rinse and Duke's soln. Anticipate these may improve on paclitaxel    #Hemorrhoid: improved  --saw colorectal and was given a cream    #Tobacco use disorder:  --1/2 ppd x 20 years; working to cut down on her own- now down to 2 cigarettes qod      #Chemotherapy induced nausea: stable   --cont trial of taking a preventative zofran in the am  --continue phenergan as needed as this does help    All questions were answered to her apparent  satisfaction. Will see her back in 2 weeks or sooner should the need arise.     Sonia Johnson MD        Route Chart for Scheduling    Med Onc Chart Routing  Urgent    Follow up with physician    Follow up with DARIANA 2 weeks.   Infusion scheduling note   got delayed so needs infusion adjusted; needs one more cycle on 2/1   Injection scheduling note needs growth factor injections 2/2 and 2/5 (can cancel injection 1/30 please)   Labs CBC and CMP   Scheduling:  Preferred lab:  Lab interval:     Imaging None      Pharmacy appointment No pharmacy appointment needed      Other referrals no referral to Oncology Primary Care needed -  no Massage appointment needed    No additional referrals needed             Treatment Plan Information   OP BREAST DOSE-DENSE AC-T (DOXORUBICIN CYCLOPHOSPHAMIDE Q2W FOLLOWED BY PACLITAXEL WEEKLY)   Sonia Johnson MD   Upcoming Treatment Dates - OP BREAST DOSE-DENSE AC-T (DOXORUBICIN CYCLOPHOSPHAMIDE Q2W FOLLOWED BY PACLITAXEL WEEKLY)    1/25/2024       Pre-Medications       diphenhydrAMINE (BENADRYL) 50 mg in NS 50 mL IVPB       dexAMETHasone (DECADRON) 10 mg in sodium chloride 0.9% 50 mL IVPB       famotidine (PF) injection 20 mg       Chemotherapy       PACLitaxeL (TAXOL) 65 mg/m2 = 120 mg in sodium chloride 0.9% 250 mL chemo infusion       Antiemetics       prochlorperazine injection Soln 5 mg  2/1/2024       Pre-Medications       diphenhydrAMINE (BENADRYL) 50 mg in NS 50 mL IVPB       dexAMETHasone (DECADRON) 10 mg in sodium chloride 0.9% 50 mL IVPB       famotidine (PF) injection 20 mg       Chemotherapy       PACLitaxeL (TAXOL) 65 mg/m2 = 120 mg in sodium chloride 0.9% 250 mL chemo infusion       Antiemetics       prochlorperazine injection Soln 5 mg    Supportive Plan Information  OP FILGRASTIM 300 MCG   Moraima Alvarado NP   Upcoming Treatment Dates - OP FILGRASTIM 300 MCG    1/26/2024       Medications       filgrastim-sndz (ZARXIO) injection 300 mcg/0.5 mL (Preferred  Regimen)  1/29/2024       Medications       filgrastim-sndz (ZARXIO) injection 300 mcg/0.5 mL (Preferred Regimen)  1/30/2024       Medications       filgrastim-sndz (ZARXIO) injection 300 mcg/0.5 mL (Preferred Regimen)  1/31/2024       Medications       filgrastim-sndz (ZARXIO) injection 300 mcg/0.5 mL (Preferred Regimen)    MDM includes  :    - Acute or chronic illness or injury that poses a threat to life or bodily function  - Review of prior external notes from unique source  - Independent review and explanation of 3+ results from unique tests  - Discussion of management and ordering 3+ unique tests  - Extensive discussion of treatment and management  - Prescription drug management  - Drug therapy requiring intensive monitoring for toxicity

## 2024-01-25 NOTE — PLAN OF CARE
0920  Patient seated in chair accompanied by .  VSS, assessment done.  Port accessed, flushed, blood return noted, started NS @ 50 cc/hr KVO while waiting for Taxol from [pharmacy.  WCTM for safety

## 2024-02-01 ENCOUNTER — INFUSION (OUTPATIENT)
Dept: INFUSION THERAPY | Facility: HOSPITAL | Age: 54
End: 2024-02-01
Payer: COMMERCIAL

## 2024-02-01 ENCOUNTER — PATIENT MESSAGE (OUTPATIENT)
Dept: ADMINISTRATIVE | Facility: HOSPITAL | Age: 54
End: 2024-02-01
Payer: COMMERCIAL

## 2024-02-01 ENCOUNTER — PATIENT OUTREACH (OUTPATIENT)
Dept: ADMINISTRATIVE | Facility: HOSPITAL | Age: 54
End: 2024-02-01
Payer: COMMERCIAL

## 2024-02-01 VITALS
OXYGEN SATURATION: 98 % | BODY MASS INDEX: 29.69 KG/M2 | TEMPERATURE: 98 F | SYSTOLIC BLOOD PRESSURE: 156 MMHG | DIASTOLIC BLOOD PRESSURE: 95 MMHG | HEART RATE: 76 BPM | RESPIRATION RATE: 18 BRPM | WEIGHT: 184.75 LBS | HEIGHT: 66 IN

## 2024-02-01 DIAGNOSIS — C50.912 INFILTRATING DUCTAL CARCINOMA OF LEFT BREAST: Primary | ICD-10-CM

## 2024-02-01 PROCEDURE — 25000003 PHARM REV CODE 250: Performed by: STUDENT IN AN ORGANIZED HEALTH CARE EDUCATION/TRAINING PROGRAM

## 2024-02-01 PROCEDURE — 96413 CHEMO IV INFUSION 1 HR: CPT

## 2024-02-01 PROCEDURE — A4216 STERILE WATER/SALINE, 10 ML: HCPCS | Performed by: STUDENT IN AN ORGANIZED HEALTH CARE EDUCATION/TRAINING PROGRAM

## 2024-02-01 PROCEDURE — 96367 TX/PROPH/DG ADDL SEQ IV INF: CPT

## 2024-02-01 PROCEDURE — 96375 TX/PRO/DX INJ NEW DRUG ADDON: CPT

## 2024-02-01 PROCEDURE — 63600175 PHARM REV CODE 636 W HCPCS: Performed by: STUDENT IN AN ORGANIZED HEALTH CARE EDUCATION/TRAINING PROGRAM

## 2024-02-01 RX ORDER — SODIUM CHLORIDE 0.9 % (FLUSH) 0.9 %
10 SYRINGE (ML) INJECTION
Status: DISCONTINUED | OUTPATIENT
Start: 2024-02-01 | End: 2024-02-01 | Stop reason: HOSPADM

## 2024-02-01 RX ORDER — PROCHLORPERAZINE EDISYLATE 5 MG/ML
5 INJECTION INTRAMUSCULAR; INTRAVENOUS ONCE AS NEEDED
Status: CANCELLED
Start: 2024-02-01

## 2024-02-01 RX ORDER — FAMOTIDINE 10 MG/ML
20 INJECTION INTRAVENOUS
Status: CANCELLED | OUTPATIENT
Start: 2024-02-01

## 2024-02-01 RX ORDER — HEPARIN 100 UNIT/ML
500 SYRINGE INTRAVENOUS
Status: CANCELLED | OUTPATIENT
Start: 2024-02-01

## 2024-02-01 RX ORDER — HEPARIN 100 UNIT/ML
500 SYRINGE INTRAVENOUS
Status: DISCONTINUED | OUTPATIENT
Start: 2024-02-01 | End: 2024-02-01 | Stop reason: HOSPADM

## 2024-02-01 RX ORDER — EPINEPHRINE 0.3 MG/.3ML
0.3 INJECTION SUBCUTANEOUS ONCE AS NEEDED
Status: DISCONTINUED | OUTPATIENT
Start: 2024-02-01 | End: 2024-02-01 | Stop reason: HOSPADM

## 2024-02-01 RX ORDER — EPINEPHRINE 0.3 MG/.3ML
0.3 INJECTION SUBCUTANEOUS ONCE AS NEEDED
Status: CANCELLED | OUTPATIENT
Start: 2024-02-01

## 2024-02-01 RX ORDER — PROCHLORPERAZINE EDISYLATE 5 MG/ML
5 INJECTION INTRAMUSCULAR; INTRAVENOUS ONCE AS NEEDED
Status: DISCONTINUED | OUTPATIENT
Start: 2024-02-01 | End: 2024-02-01 | Stop reason: HOSPADM

## 2024-02-01 RX ORDER — FAMOTIDINE 10 MG/ML
20 INJECTION INTRAVENOUS
Status: COMPLETED | OUTPATIENT
Start: 2024-02-01 | End: 2024-02-01

## 2024-02-01 RX ORDER — DIPHENHYDRAMINE HYDROCHLORIDE 50 MG/ML
50 INJECTION INTRAMUSCULAR; INTRAVENOUS ONCE AS NEEDED
Status: CANCELLED | OUTPATIENT
Start: 2024-02-01

## 2024-02-01 RX ORDER — SODIUM CHLORIDE 0.9 % (FLUSH) 0.9 %
10 SYRINGE (ML) INJECTION
Status: CANCELLED | OUTPATIENT
Start: 2024-02-01

## 2024-02-01 RX ORDER — DIPHENHYDRAMINE HYDROCHLORIDE 50 MG/ML
50 INJECTION INTRAMUSCULAR; INTRAVENOUS ONCE AS NEEDED
Status: DISCONTINUED | OUTPATIENT
Start: 2024-02-01 | End: 2024-02-01 | Stop reason: HOSPADM

## 2024-02-01 RX ADMIN — HEPARIN 500 UNITS: 100 SYRINGE at 04:02

## 2024-02-01 RX ADMIN — DIPHENHYDRAMINE HYDROCHLORIDE 50 MG: 50 INJECTION, SOLUTION INTRAMUSCULAR; INTRAVENOUS at 02:02

## 2024-02-01 RX ADMIN — DEXAMETHASONE SODIUM PHOSPHATE 10 MG: 4 INJECTION, SOLUTION INTRA-ARTICULAR; INTRALESIONAL; INTRAMUSCULAR; INTRAVENOUS; SOFT TISSUE at 03:02

## 2024-02-01 RX ADMIN — SODIUM CHLORIDE: 9 INJECTION, SOLUTION INTRAVENOUS at 02:02

## 2024-02-01 RX ADMIN — Medication 10 ML: at 04:02

## 2024-02-01 RX ADMIN — PACLITAXEL 120 MG: 6 INJECTION, SOLUTION INTRAVENOUS at 03:02

## 2024-02-01 RX ADMIN — FAMOTIDINE 20 MG: 10 INJECTION, SOLUTION INTRAVENOUS at 02:02

## 2024-02-01 NOTE — PROGRESS NOTES
Care Everywhere updates requested and reviewed.  Immunizations reconciled. Media reports reviewed.  Duplicate HM overrides and  orders removed.  Overdue HM topic chart audit and/or requested.  Overdue lab testing linked to upcoming lab appointments if applies.          Health Maintenance Due   Topic Date Due    Cervical Cancer Screening  2023    COVID-19 Vaccine (3 - 2023- season) 2023    Shingles Vaccine (2 of 2) 2023

## 2024-02-01 NOTE — NURSING
PT tolerated Taxol infusion well. No adverse reaction noted. Pt education reinforced on side effects, what to expect and when to contact the doctor. Pt verbalized understanding. PAC flushed with heparin and de-accessed per protocol.

## 2024-02-02 ENCOUNTER — TELEPHONE (OUTPATIENT)
Dept: SURGERY | Facility: CLINIC | Age: 54
End: 2024-02-02
Payer: COMMERCIAL

## 2024-02-02 NOTE — TELEPHONE ENCOUNTER
Called patient and scheduled appt with Dr. Sequeira on 2/6/24. Reviewed location of appt. Patient verbalized understanding.

## 2024-02-05 ENCOUNTER — HOSPITAL ENCOUNTER (OUTPATIENT)
Dept: RADIOLOGY | Facility: OTHER | Age: 54
Discharge: HOME | End: 2024-02-05
Attending: NURSE PRACTITIONER
Payer: COMMERCIAL

## 2024-02-05 DIAGNOSIS — C50.212 MALIGNANT NEOPLASM OF UPPER-INNER QUADRANT OF LEFT BREAST IN FEMALE, ESTROGEN RECEPTOR NEGATIVE: ICD-10-CM

## 2024-02-05 DIAGNOSIS — Z12.39 ENCOUNTER FOR BREAST CANCER SCREENING OTHER THAN MAMMOGRAM: ICD-10-CM

## 2024-02-05 DIAGNOSIS — Z17.1 MALIGNANT NEOPLASM OF UPPER-INNER QUADRANT OF LEFT BREAST IN FEMALE, ESTROGEN RECEPTOR NEGATIVE: ICD-10-CM

## 2024-02-05 PROCEDURE — 77049 MRI BREAST C-+ W/CAD BI: CPT | Mod: TC

## 2024-02-05 PROCEDURE — A9577 INJ MULTIHANCE: HCPCS | Performed by: NURSE PRACTITIONER

## 2024-02-05 PROCEDURE — 25500020 PHARM REV CODE 255: Performed by: NURSE PRACTITIONER

## 2024-02-05 PROCEDURE — 77049 MRI BREAST C-+ W/CAD BI: CPT | Mod: 26,,, | Performed by: RADIOLOGY

## 2024-02-05 RX ADMIN — GADOBENATE DIMEGLUMINE 15 ML: 529 INJECTION, SOLUTION INTRAVENOUS at 08:02

## 2024-02-06 ENCOUNTER — OFFICE VISIT (OUTPATIENT)
Dept: SURGERY | Facility: CLINIC | Age: 54
End: 2024-02-06
Payer: COMMERCIAL

## 2024-02-06 VITALS
HEIGHT: 66 IN | BODY MASS INDEX: 29.73 KG/M2 | HEART RATE: 86 BPM | SYSTOLIC BLOOD PRESSURE: 113 MMHG | DIASTOLIC BLOOD PRESSURE: 77 MMHG | WEIGHT: 185 LBS

## 2024-02-06 DIAGNOSIS — C50.912 INFILTRATING DUCTAL CARCINOMA OF LEFT BREAST: ICD-10-CM

## 2024-02-06 DIAGNOSIS — C50.212 MALIGNANT NEOPLASM OF UPPER-INNER QUADRANT OF LEFT BREAST IN FEMALE, ESTROGEN RECEPTOR NEGATIVE: Primary | ICD-10-CM

## 2024-02-06 DIAGNOSIS — Z17.1 MALIGNANT NEOPLASM OF UPPER-INNER QUADRANT OF LEFT BREAST IN FEMALE, ESTROGEN RECEPTOR NEGATIVE: Primary | ICD-10-CM

## 2024-02-06 PROCEDURE — 3008F BODY MASS INDEX DOCD: CPT | Mod: CPTII,S$GLB,, | Performed by: SURGERY

## 2024-02-06 PROCEDURE — 3078F DIAST BP <80 MM HG: CPT | Mod: CPTII,S$GLB,, | Performed by: SURGERY

## 2024-02-06 PROCEDURE — 3074F SYST BP LT 130 MM HG: CPT | Mod: CPTII,S$GLB,, | Performed by: SURGERY

## 2024-02-06 PROCEDURE — 99214 OFFICE O/P EST MOD 30 MIN: CPT | Mod: S$GLB,,, | Performed by: SURGERY

## 2024-02-06 PROCEDURE — 99999 PR PBB SHADOW E&M-EST. PATIENT-LVL III: CPT | Mod: PBBFAC,,, | Performed by: SURGERY

## 2024-02-06 NOTE — PROGRESS NOTES
Breast Surgery  [facility]  Department of Surgery      Chief Complaint: Left Breast Invasive Ductal Carcinoma      Subjective:      Patient ID: Teresa Valdes is a 53 y.o. female who presents with tripple negative left breast invasive ductal carcinoma s/p neoadjuvant chemotherapy with dose dense doxorubicin and cyclophosphamide.    HPI: Pt noted knot in the left inner breast. Follow-up diagnostic mammogram (23) and ultrasound (23) showed left breast mass 1.9 cm in greatest dimension, complex cystic and solid mass BIRADS 4. A ultrasound guided biopsy and placement of ring marker (twirl) was performed on 23 with pathology revealing infiltrating ductal carcinoma of the left breast. She completed 12 cycles of chemo on 24. After 4 cycles she could not feel the knot in her inner left breast.    Patient does routinely do self breast exams.  Patient has noted a change on breast exam.  Patient denies nipple discharge. Patient denies to previous breast biopsy. Patient denies a personal history of breast cancer.    Genetic testing was negative.     Current 0.5 pack a day smoker. Quit 2 days ago.     Findings at time of MRI on 23:  Tumor size: 1.5 x 1.0 x 1.2 cm, 11 o'clock, 7.6 cm from the nipple, 3.6 cm from chest wall (MRI 23)   Tumor thgthrthathdtheth:th th4th Estrogen Receptor: -   Progesterone Receptor: -   Her-2 sue: -   Lymph node status: clinically negative    Lymphatic invasion: not identified     GYN History:  Age of menarche was 14.  Menopause:48  Patient denies hormonal therapy.   Patient is .   Age of first live birth was 18.   Patient did not use hormone therapy   Patient did not breast feed.    Past Medical History:   Diagnosis Date    Anxiety     Depression     Hypertension     resolved    Ovarian cancer     Sebaceous cyst 2018     Past Surgical History:   Procedure Laterality Date    ADENOIDECTOMY      BREAST CYST EXCISION Right     BREAST SURGERY      Abscess      CHOLECYSTECTOMY      COLONOSCOPY N/A 4/11/2023    Procedure: COLONOSCOPY;  Surgeon: Mona Valverde MD;  Location: [facility];  Service: Endoscopy;  Laterality: N/A;    CYST REMOVAL      on back    cyst removed left wrist      KNEE LIGAMENT RECONSTRUCTION Left     sleeve gastroectomy  06/2017    in [place]    TONSILLECTOMY      Uvuloplasty       Current Outpatient Medications on File Prior to Visit   Medication Sig Dispense Refill    dexAMETHasone (DECADRON) 4 MG Tab Take 8mg (2 pills) po daily on days 2-4 of chemotherapy. Only for cycles 1-4 40 tablet 0    duke's soln (benadryl 30 mL, mylanta 30 mL, LIDOcaine 30 mL, nystatin 30 mL) 120mL Take 10 mLs by mouth 4 (four) times daily. 120 mL 1    ergocalciferol (ERGOCALCIFEROL) 50,000 unit Cap Take 1 capsule (50,000 Units total) by mouth every 7 days. 12 capsule 3    hydrocortisone (ANUSOL-HC) 2.5 % rectal cream Place rectally 2 (two) times daily. 28 g 1    LIDOcaine-prilocaine (EMLA) cream Apply topically as needed. 5 g 1    multivitamin capsule Take 1 capsule by mouth once daily.      OLANZapine (ZYPREXA) 5 MG tablet Take 5mg nightly on days 1-4 of chemotherapy 30 tablet 2    ondansetron (ZOFRAN) 8 MG tablet Take 1 tablet (8 mg total) by mouth every 12 (twelve) hours as needed for Nausea. 30 tablet 2    promethazine (PHENERGAN) 12.5 MG Tab Take 1 tablet (12.5 mg total) by mouth every 4 (four) hours as needed (nausea). 30 tablet 2    venlafaxine (EFFEXOR) 37.5 MG Tab Take 1 tablet (37.5 mg total) by mouth once daily. 90 tablet 3     Current Facility-Administered Medications on File Prior to Visit   Medication Dose Route Frequency Provider Last Rate Last Admin    [COMPLETED] filgrastim-sndz (ZARXIO) injection 300 mcg/0.5 mL (Preferred Regimen)  300 mcg Subcutaneous 1 time in Clinic/HOD Sonia Johnson MD   300 mcg at 02/05/24 6294    [COMPLETED] gadobenate dimeglumine (MULTIHANCE) injection 15 mL  15 mL Intravenous ONCE PRN Latanya Vines NP   15 mL at  02/05/24 2003     Social History     Socioeconomic History    Marital status:    Occupational History     Employer: [employer]   Tobacco Use    Smoking status: Some Days     Current packs/day: 0.50     Average packs/day: 0.5 packs/day for 6.9 years (3.5 ttl pk-yrs)     Types: Cigarettes     Start date: 3/7/2017    Smokeless tobacco: Never    Tobacco comments:     Working on quitting    Substance and Sexual Activity    Alcohol use: Yes     Alcohol/week: 2.0 standard drinks of alcohol     Types: 2 Glasses of wine per week     Comment: occ    Drug use: Never    Sexual activity: Yes     Partners: Male     Birth control/protection: Partner-Vasectomy     Comment:    Social History Narrative    She teaches Band at middle school. Lives with her  and 2 children in [place]. She has an older daughter who she gave up for adoption. Exercising.      Social Determinants of Health     Financial Resource Strain: Medium Risk (1/10/2024)    Overall Financial Resource Strain (CARDIA)     Difficulty of Paying Living Expenses: Somewhat hard   Food Insecurity: No Food Insecurity (1/10/2024)    Hunger Vital Sign     Worried About Running Out of Food in the Last Year: Never true     Ran Out of Food in the Last Year: Never true   Transportation Needs: No Transportation Needs (1/10/2024)    PRAPARE - Transportation     Lack of Transportation (Medical): No     Lack of Transportation (Non-Medical): No   Physical Activity: Insufficiently Active (1/10/2024)    Exercise Vital Sign     Days of Exercise per Week: 2 days     Minutes of Exercise per Session: 10 min   Stress: Stress Concern Present (1/10/2024)    [employer] - Occupational Stress Questionnaire     Feeling of Stress : To some extent   Social Connections: Unknown (1/10/2024)    Social Connection and Isolation Panel [NHANES]     Frequency of Communication with Friends and Family: More than three times  "a week     Frequency of Social Gatherings with Friends and Family: Twice a week     Active Member of Clubs or Organizations: Yes     Attends Club or Organization Meetings: 1 to 4 times per year     Marital Status:    Housing Stability: Low Risk  (1/10/2024)    Housing Stability Vital Sign     Unable to Pay for Housing in the Last Year: No     Number of Places Lived in the Last Year: 2     Unstable Housing in the Last Year: No     Family History   Problem Relation Age of Onset    Arthritis Mother     Hypertension Father     Heart disease Father     Breast cancer Paternal Grandmother     Cancer Neg Hx         Review of Systems   Constitutional:  Negative for activity change, appetite change and fatigue.   HENT:  Negative for congestion, ear pain and hearing loss.    Eyes:  Negative for pain, discharge and itching.   Respiratory:  Negative for choking, chest tightness and shortness of breath.    Cardiovascular:  Negative for chest pain and palpitations.   Gastrointestinal:  Negative for abdominal distention, abdominal pain, nausea and vomiting.   Genitourinary:  Negative for difficulty urinating, dyspareunia and dysuria.   Skin:  Negative for color change, pallor and rash.   Neurological:  Negative for dizziness, numbness and headaches.     Objective:   /77 (BP Location: Left arm, Patient Position: Sitting, BP Method: Medium (Automatic))   Pulse 86   Ht 5' 6" (1.676 m)   Wt 83.9 kg (185 lb)   LMP 10/15/2018 (Within Weeks)   BMI 29.86 kg/m²     Physical Exam   HENT:   Head: Normocephalic and atraumatic.   Eyes: Conjunctivae are normal. No scleral icterus.   Cardiovascular:  Normal rate, regular rhythm and normal pulses.            Pulmonary/Chest: Effort normal and breath sounds normal. No accessory muscle usage or stridor. No respiratory distress. She has no wheezes. Right breast exhibits no inverted nipple, no mass, no nipple discharge, no skin change and no tenderness. Left breast exhibits no " inverted nipple, no mass, no nipple discharge, no skin change and no tenderness.   Abdominal: Soft. Normal appearance. She exhibits no mass.   Musculoskeletal: Normal range of motion. No deformity. Lymphadenopathy:      Cervical: No cervical adenopathy.      Upper Body:      Right upper body: No supraclavicular or axillary adenopathy.      Left upper body: No supraclavicular or axillary adenopathy.     Neurological: She is alert.   Skin: Skin is warm and dry.     Psychiatric: Her behavior is normal. Mood, judgment and thought content normal.       Radiology review: Images personally reviewed by me in the clinic.   MRI 2/5/2024:   Impression:  Left   The no malignancy of the 11 o'clock position of the left breast shows a complete imaging response to neoadjuvant therapy.      Assessment:       1. Malignant neoplasm of upper-inner quadrant of left breast in female, estrogen receptor negative        Plan:     Options for management were discussed with the patient and her family. We reviewed the existing data noting the equivalency of breast conserving surgery with radiation therapy and mastectomy. We also reviewed the guidelines of the National Comprehensive Cancer Network for Stage 1 breast carcinoma. We discussed the need for lumpectomy margins to be negative for carcinoma, the necessity for postoperative radiation therapy after breast conservation in most cases, the possibility of a failed or false negative sentinel lymph node biopsy and the potential need for complete lymphadenectomy for a failed or positive sentinel lymph node biopsy were fully discussed. In the setting of mastectomy, delayed or immediate reconstruction options are available and were discussed.     In the setting of lumpectomy, radiation therapy would be recommended majority of the time.  The duration and treatment side effects were discussed with the patient.  This will coordinated with the radiation oncologist pending final pathology.    We also  discussed the role of systemic therapy in the treatment of early stage breast cancer.  We discussed that this is based on tumor biology and karri status and will be determined based on final pathology.  We discussed that if the cancer is hormone positive, endocrine therapy would be recommended in most cases and its use can reduce the risk of recurrence as well as improve survival. Side effects of treatment were briefly discussed. We also discussed the potential role for chemotherapy based on a number of factors such as tumor phenotype (ER+ vs. triple negative vs. Wuy3vne+) and this would be determined in coordination with the medical oncologist.    The patient, in consultation with her family, has elected to proceed with left breast partial mastectomy and left sentinel lymph node biopsy. She would like to see plastics for consultation on oncoplastic reduction. Smoking cessation discussed at length. She understands smoking make her a poor candidate for reconstruction if she opts for mastectomy.  The operative risks of bleeding, infection, recurrence, scarring, and anesthetic complications and the possibility of requiring further surgery were all noted and informed consent obtained.    Surgery will be scheduled in coordination with plastic surgery.    Patient was educated on breast cancer, receptors, wire localization lumpectomy, mastectomy, sentinel lymph node mapping and biopsy, axillary lymph node dissection, reconstruction, breast prosthesis with post-mastectomy bra and radiation therapy. Patient was given patient information binder including [facility] breast cancer treatment brochure.  All her questions were answered.    Total time spent with the patient: 45 minutes.  30 minutes of face to face consultation and 15 minutes of chart review and coordination of care.

## 2024-02-07 ENCOUNTER — OFFICE VISIT (OUTPATIENT)
Dept: HEMATOLOGY/ONCOLOGY | Facility: CLINIC | Age: 54
End: 2024-02-07
Payer: COMMERCIAL

## 2024-02-07 ENCOUNTER — TELEPHONE (OUTPATIENT)
Dept: PLASTIC SURGERY | Facility: CLINIC | Age: 54
End: 2024-02-07
Payer: COMMERCIAL

## 2024-02-07 VITALS
SYSTOLIC BLOOD PRESSURE: 129 MMHG | RESPIRATION RATE: 18 BRPM | DIASTOLIC BLOOD PRESSURE: 88 MMHG | HEIGHT: 66 IN | HEART RATE: 69 BPM | TEMPERATURE: 98 F | OXYGEN SATURATION: 100 % | BODY MASS INDEX: 30.11 KG/M2 | WEIGHT: 187.38 LBS

## 2024-02-07 DIAGNOSIS — C50.912 INFILTRATING DUCTAL CARCINOMA OF LEFT BREAST: Primary | ICD-10-CM

## 2024-02-07 DIAGNOSIS — Z17.1 MALIGNANT NEOPLASM OF UPPER-INNER QUADRANT OF LEFT BREAST IN FEMALE, ESTROGEN RECEPTOR NEGATIVE: ICD-10-CM

## 2024-02-07 DIAGNOSIS — C50.212 MALIGNANT NEOPLASM OF UPPER-INNER QUADRANT OF LEFT BREAST IN FEMALE, ESTROGEN RECEPTOR NEGATIVE: ICD-10-CM

## 2024-02-07 PROCEDURE — 99215 OFFICE O/P EST HI 40 MIN: CPT | Mod: S$GLB,,, | Performed by: NURSE PRACTITIONER

## 2024-02-07 PROCEDURE — 3074F SYST BP LT 130 MM HG: CPT | Mod: CPTII,S$GLB,, | Performed by: NURSE PRACTITIONER

## 2024-02-07 PROCEDURE — 99999 PR PBB SHADOW E&M-EST. PATIENT-LVL IV: CPT | Mod: PBBFAC,,, | Performed by: NURSE PRACTITIONER

## 2024-02-07 PROCEDURE — 1160F RVW MEDS BY RX/DR IN RCRD: CPT | Mod: CPTII,S$GLB,, | Performed by: NURSE PRACTITIONER

## 2024-02-07 PROCEDURE — 1159F MED LIST DOCD IN RCRD: CPT | Mod: CPTII,S$GLB,, | Performed by: NURSE PRACTITIONER

## 2024-02-07 PROCEDURE — 3079F DIAST BP 80-89 MM HG: CPT | Mod: CPTII,S$GLB,, | Performed by: NURSE PRACTITIONER

## 2024-02-07 PROCEDURE — 3008F BODY MASS INDEX DOCD: CPT | Mod: CPTII,S$GLB,, | Performed by: NURSE PRACTITIONER

## 2024-02-07 NOTE — TELEPHONE ENCOUNTER
Spoke to pt and scheduled appt for a consult to see Dr Patel on 02/20/24 0830 at [facility], 2nd floor. [address].    [facility] - 1st floor t with appointment time and date, address of the location and call back # to RN navigator. All questions and concerns addressed.

## 2024-02-07 NOTE — TELEPHONE ENCOUNTER
Attempted to contact pt to discuss scheduling. Pt was not available at the time of the call. I left a detailed VM asking pt to please call office at her convenience to discuss.Call Back number given

## 2024-02-09 ENCOUNTER — PATIENT OUTREACH (OUTPATIENT)
Dept: ADMINISTRATIVE | Facility: HOSPITAL | Age: 54
End: 2024-02-09
Payer: COMMERCIAL

## 2024-02-09 ENCOUNTER — TELEPHONE (OUTPATIENT)
Dept: SURGERY | Facility: CLINIC | Age: 54
End: 2024-02-09
Payer: COMMERCIAL

## 2024-02-14 RX ORDER — CEFAZOLIN SODIUM 2 G/50ML
2 SOLUTION INTRAVENOUS
Status: CANCELLED | OUTPATIENT
Start: 2024-02-14

## 2024-02-14 RX ORDER — SODIUM CHLORIDE 9 MG/ML
INJECTION, SOLUTION INTRAVENOUS CONTINUOUS
Status: CANCELLED | OUTPATIENT
Start: 2024-02-14

## 2024-02-19 DIAGNOSIS — Z01.818 PRE-OP EXAM: Primary | ICD-10-CM

## 2024-02-20 ENCOUNTER — OFFICE VISIT (OUTPATIENT)
Dept: PLASTIC SURGERY | Facility: CLINIC | Age: 54
End: 2024-02-20
Payer: COMMERCIAL

## 2024-02-20 VITALS
HEIGHT: 66 IN | SYSTOLIC BLOOD PRESSURE: 121 MMHG | RESPIRATION RATE: 19 BRPM | WEIGHT: 188.5 LBS | DIASTOLIC BLOOD PRESSURE: 81 MMHG | HEART RATE: 64 BPM | OXYGEN SATURATION: 99 % | BODY MASS INDEX: 30.29 KG/M2

## 2024-02-20 DIAGNOSIS — C50.912 INFILTRATING DUCTAL CARCINOMA OF LEFT BREAST: Primary | ICD-10-CM

## 2024-02-20 PROCEDURE — 3008F BODY MASS INDEX DOCD: CPT | Mod: CPTII,S$GLB,, | Performed by: SURGERY

## 2024-02-20 PROCEDURE — 3079F DIAST BP 80-89 MM HG: CPT | Mod: CPTII,S$GLB,, | Performed by: SURGERY

## 2024-02-20 PROCEDURE — 1159F MED LIST DOCD IN RCRD: CPT | Mod: CPTII,S$GLB,, | Performed by: SURGERY

## 2024-02-20 PROCEDURE — 3074F SYST BP LT 130 MM HG: CPT | Mod: CPTII,S$GLB,, | Performed by: SURGERY

## 2024-02-20 PROCEDURE — 99205 OFFICE O/P NEW HI 60 MIN: CPT | Mod: S$GLB,,, | Performed by: SURGERY

## 2024-02-20 PROCEDURE — 99999 PR PBB SHADOW E&M-EST. PATIENT-LVL III: CPT | Mod: PBBFAC,,, | Performed by: SURGERY

## 2024-02-20 NOTE — ASSESSMENT & PLAN NOTE
Patient is a good candidate for an oncoplastic breast reduction. Discussed the procedure in detail including using a pedicle of breast tissue to support blood supply to the nipple, the use of Wise pattern skin flaps to reduce size and support the pedicle, risks of skin loss, fat necrosis, partial or complete nipple loss, delayed wound healing, normal scarring, outpatient surgery, general recovery and the use of drains. Sabas illustration to demonstrate operative details and skin reduction patterns.    Counseled that after surgery the breast that is anticipated to undergo radiation will be kept slightly larger than the noncancer side in anticipation of skin and breast tissue changes from radiation. Also discussed that breasts may be bruised or swollen afterwards and won't have their final appearance for 3-6 months after surgery.

## 2024-02-20 NOTE — PROGRESS NOTES
Plastic Surgery History & Physical    SUBJECTIVE:   Chief complaint: Breast cancer reconstruction     History of Present Illness:  53 y.o. 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 She underwent an abnormal MMG in April 2023 showing asymmetry in the central left breast. Biopsy confirmed grade 3 IDC. MRI with no associated lymphadenopathy. She started NACT in August and completed it 2 weeks ago. She tolerated it well. Post chemo MRI showed complete imaging response to neoadjuvant therapy. In discussion with her breast surgeon, she plans to undergo left lumpectomy followed by adjuvant radiotherapy. She is interested in oncoplastic breast reduction for symmetry and a lift. She would like to maintain as much breast volume as she can.     She has a hx of gastric sleeve performed in [place] 7 years ago. She lost 175lb. Weight has been stable.  She is a former smoker (20 pack year) and quit at the beginning of February. She is not using any nicotine products.  She works as a . Plays flute, among other instruments.    Past Medical History:   Diagnosis Date    Anxiety     Depression     Hypertension     resolved    Ovarian cancer     Sebaceous cyst 4/2/2018       Past Surgical History:   Procedure Laterality Date    ADENOIDECTOMY      BREAST CYST EXCISION Right     BREAST SURGERY      Abscess     CHOLECYSTECTOMY      COLONOSCOPY N/A 4/11/2023    Procedure: COLONOSCOPY;  Surgeon: Mona Valverde MD;  Location: [facility];  Service: Endoscopy;  Laterality: N/A;    CYST REMOVAL      on back    cyst removed left wrist      KNEE LIGAMENT RECONSTRUCTION Left     sleeve gastroectomy  06/2017    in [place]    TONSILLECTOMY      Uvuloplasty         Family History   Problem Relation Age of Onset    Arthritis Mother     Hypertension Father     Heart disease Father     Breast cancer Paternal Grandmother     Cancer Neg Hx        Social History      Socioeconomic History    Marital status:    Occupational History     Employer: [employer]   Tobacco Use    Smoking status: Some Days     Current packs/day: 0.50     Average packs/day: 0.5 packs/day for 7.0 years (3.5 ttl pk-yrs)     Types: Cigarettes     Start date: 3/7/2017    Smokeless tobacco: Never    Tobacco comments:     Working on quitting    Substance and Sexual Activity    Alcohol use: Yes     Alcohol/week: 2.0 standard drinks of alcohol     Types: 2 Glasses of wine per week     Comment: occ    Drug use: Never    Sexual activity: Yes     Partners: Male     Birth control/protection: Partner-Vasectomy     Comment:    Social History Narrative    She teaches Band at middle school. Lives with her  and 2 children in [facility]. She has an older daughter who she gave up for adoption. Exercising.      Social Determinants of Health     Financial Resource Strain: Medium Risk (1/10/2024)    Overall Financial Resource Strain (CARDIA)     Difficulty of Paying Living Expenses: Somewhat hard   Food Insecurity: No Food Insecurity (1/10/2024)    Hunger Vital Sign     Worried About Running Out of Food in the Last Year: Never true     Ran Out of Food in the Last Year: Never true   Transportation Needs: No Transportation Needs (1/10/2024)    PRAPARE - Transportation     Lack of Transportation (Medical): No     Lack of Transportation (Non-Medical): No   Physical Activity: Insufficiently Active (1/10/2024)    Exercise Vital Sign     Days of Exercise per Week: 2 days     Minutes of Exercise per Session: 10 min   Stress: Stress Concern Present (1/10/2024)    [employer] [place] - Occupational Stress Questionnaire     Feeling of Stress : To some extent   Social Connections: Unknown (1/10/2024)    Social Connection and Isolation Panel [NHANES]     Frequency of Communication with Friends and Family: More than three times a week     Frequency of Social  Gatherings with Friends and Family: Twice a week     Active Member of Clubs or Organizations: Yes     Attends Club or Organization Meetings: 1 to 4 times per year     Marital Status:    Housing Stability: Low Risk  (1/10/2024)    Housing Stability Vital Sign     Unable to Pay for Housing in the Last Year: No     Number of Places Lived in the Last Year: 2     Unstable Housing in the Last Year: No       Current Outpatient Medications   Medication Sig Dispense Refill    dexAMETHasone (DECADRON) 4 MG Tab Take 8mg (2 pills) po daily on days 2-4 of chemotherapy. Only for cycles 1-4 40 tablet 0    duke's soln (benadryl 30 mL, mylanta 30 mL, LIDOcaine 30 mL, nystatin 30 mL) 120mL Take 10 mLs by mouth 4 (four) times daily. 120 mL 1    ergocalciferol (ERGOCALCIFEROL) 50,000 unit Cap Take 1 capsule (50,000 Units total) by mouth every 7 days. 12 capsule 3    multivitamin capsule Take 1 capsule by mouth once daily.      OLANZapine (ZYPREXA) 5 MG tablet Take 5mg nightly on days 1-4 of chemotherapy 30 tablet 2    ondansetron (ZOFRAN) 8 MG tablet Take 1 tablet (8 mg total) by mouth every 12 (twelve) hours as needed for Nausea. 30 tablet 2    promethazine (PHENERGAN) 12.5 MG Tab Take 1 tablet (12.5 mg total) by mouth every 4 (four) hours as needed (nausea). 30 tablet 2    venlafaxine (EFFEXOR) 37.5 MG Tab Take 1 tablet (37.5 mg total) by mouth once daily. 90 tablet 3    hydrocortisone (ANUSOL-HC) 2.5 % rectal cream Place rectally 2 (two) times daily. (Patient not taking: Reported on 2/20/2024) 28 g 1    LIDOcaine-prilocaine (EMLA) cream Apply topically as needed. (Patient not taking: Reported on 2/20/2024) 5 g 1     No current facility-administered medications for this visit.       Review of patient's allergies indicates:  No Known Allergies      Review of Systems:  Review of Systems        OBJECTIVE:     /81 (BP Location: Left arm, Patient Position: Sitting, BP Method: Large (Automatic))   Pulse 64   Resp 19   Ht  "5' 6" (1.676 m)   Wt 85.5 kg (188 lb 7.9 oz)   LMP 10/15/2018 (Within Weeks)   SpO2 99%   BMI 30.42 kg/m²       Physical Exam:  Gen: NAD, appears stated age  Neuro: normal without focal findings, mental status and speech normal  HEENT: NCAT, neck supple, PEERL  CV: RRR  Pulm: Breathing non-labored, chest wall movement equal bilaterally   Breast: soft, no masses, no scarring and pendulous ptotic breasts   Right Left   SN-N 35 34   N-IMF 12 11   N-N 20   BW 14.5 14.5      Abdomen: soft, nontender, no guarding  Gu: genitalia not examined  Extremity:normal strength, no cyanosis or edema  Skin: Skin color, texture, turgor normal. No rashes or lesions  Psych: oriented to time, place and person          ASSESSMENT/PLAN:     Invasive ductal carcinoma of breast  Patient is a good candidate for an oncoplastic breast reduction. Discussed the procedure in detail including using a pedicle of breast tissue to support blood supply to the nipple, the use of Wise pattern skin flaps to reduce size and support the pedicle, risks of skin loss, fat necrosis, partial or complete nipple loss, delayed wound healing, normal scarring, outpatient surgery, general recovery and the use of drains. Sabas illustration to demonstrate operative details and skin reduction patterns.    Counseled that after surgery the breast that is anticipated to undergo radiation will be kept slightly larger than the noncancer side in anticipation of skin and breast tissue changes from radiation. Also discussed that breasts may be bruised or swollen afterwards and won't have their final appearance for 3-6 months after surgery.        Courtney Browning PA-C  Plastic and Reconstructive Surgery    I spent a total of 60 minutes on the day of the visit.This includes face to face time and non-face to face time preparing to see the patient (eg, review of tests), obtaining and/or reviewing separately obtained history, documenting clinical information in the " electronic or other health record, independently interpreting results and communicating results to the patient/family/caregiver, or care coordinator.

## 2024-02-21 ENCOUNTER — TELEPHONE (OUTPATIENT)
Dept: PLASTIC SURGERY | Facility: CLINIC | Age: 54
End: 2024-02-21
Payer: COMMERCIAL

## 2024-02-21 NOTE — TELEPHONE ENCOUNTER
Spoke to pt and confirmed  a surgery day 3/6/24 at the [facility] location / 1st floor - Pt agreeable. Provided patient with details of post op appt, basic prep for sx, arrival time the day before by Hua's office ,appt w  anesthesia dept, and address of the locations.  call back # to RN navigator given should any questions or concerns arise  All questions and concerns addressed. Pt voiced understanding.

## 2024-02-23 ENCOUNTER — ANESTHESIA EVENT (OUTPATIENT)
Dept: SURGERY | Facility: OTHER | Age: 54
End: 2024-02-23
Payer: COMMERCIAL

## 2024-02-23 RX ORDER — ACETAMINOPHEN 500 MG
1000 TABLET ORAL
Status: CANCELLED | OUTPATIENT
Start: 2024-02-23 | End: 2024-02-23

## 2024-02-23 RX ORDER — LIDOCAINE HYDROCHLORIDE 10 MG/ML
0.5 INJECTION, SOLUTION EPIDURAL; INFILTRATION; INTRACAUDAL; PERINEURAL ONCE
Status: CANCELLED | OUTPATIENT
Start: 2024-02-23 | End: 2024-02-23

## 2024-02-23 RX ORDER — PREGABALIN 75 MG/1
75 CAPSULE ORAL ONCE
Status: CANCELLED | OUTPATIENT
Start: 2024-02-23 | End: 2024-02-23

## 2024-02-23 RX ORDER — SODIUM CHLORIDE, SODIUM LACTATE, POTASSIUM CHLORIDE, CALCIUM CHLORIDE 600; 310; 30; 20 MG/100ML; MG/100ML; MG/100ML; MG/100ML
INJECTION, SOLUTION INTRAVENOUS CONTINUOUS
Status: CANCELLED | OUTPATIENT
Start: 2024-02-23

## 2024-02-27 ENCOUNTER — PATIENT MESSAGE (OUTPATIENT)
Dept: PLASTIC SURGERY | Facility: CLINIC | Age: 54
End: 2024-02-27
Payer: COMMERCIAL

## 2024-02-27 ENCOUNTER — HOSPITAL ENCOUNTER (OUTPATIENT)
Dept: PREADMISSION TESTING | Facility: OTHER | Age: 54
Discharge: HOME | End: 2024-02-27
Attending: SURGERY
Payer: COMMERCIAL

## 2024-02-27 ENCOUNTER — PATIENT MESSAGE (OUTPATIENT)
Dept: SURGERY | Facility: CLINIC | Age: 54
End: 2024-02-27
Payer: COMMERCIAL

## 2024-02-27 VITALS
WEIGHT: 185 LBS | SYSTOLIC BLOOD PRESSURE: 126 MMHG | BODY MASS INDEX: 29.73 KG/M2 | HEIGHT: 66 IN | DIASTOLIC BLOOD PRESSURE: 80 MMHG | RESPIRATION RATE: 18 BRPM | TEMPERATURE: 97 F | HEART RATE: 78 BPM | OXYGEN SATURATION: 97 %

## 2024-02-27 NOTE — ANESTHESIA PREPROCEDURE EVALUATION
02/27/2024  Teresa Valdes is a 53 y.o., female.      Pre-op Assessment    I have reviewed the Patient Summary Reports.     I have reviewed the Nursing Notes. I have reviewed the NPO Status.   I have reviewed the Medications.     Review of Systems  Anesthesia Hx:  No problems with previous Anesthesia             Denies Family Hx of Anesthesia complications.    Denies Personal Hx of Anesthesia complications.                    Social:  Non-Smoker       Hematology/Oncology:  Hematology Normal                     Current/Recent Cancer.  Breast    left          EENT/Dental:  EENT/Dental Normal           Cardiovascular:     Hypertension                                        Pulmonary:  Pulmonary Normal                       Renal/:  Renal/ Normal                 Hepatic/GI:     GERD   Had gastric sleeve 2015  Takes OTC Pepcid daily  Told to take am of surgery          Musculoskeletal:  Musculoskeletal Normal                Neurological:  Neurology Normal                                      Endocrine:  Endocrine Normal            Dermatological:  Skin Normal    Psych:  Psychiatric Normal                    Physical Exam  General: Well nourished and Alert    Airway:  Mallampati: II   Mouth Opening: Normal  Tongue: Normal    Dental:  Intact        Anesthesia Plan  Type of Anesthesia, risks & benefits discussed:    Anesthesia Type: Gen ETT  Intra-op Monitoring Plan: Standard ASA Monitors  Post Op Pain Control Plan: multimodal analgesia  Induction:  IV  Airway Plan: Video  Informed Consent: Informed consent signed with the Patient and all parties understand the risks and agree with anesthesia plan.  All questions answered.   ASA Score: 2  Anesthesia Plan Notes: Labs/EKG/Echo in Epic  IV on Right    Ready For Surgery From Anesthesia Perspective.     .

## 2024-02-27 NOTE — DISCHARGE INSTRUCTIONS
Information to Prepare you for your Surgery    PRE-ADMIT TESTING -  [phone]    [address] [facility]          Your surgery has been scheduled at [facility]. We are pleased to have the opportunity to serve you. For Further Information please call [phone].    On the day of surgery please report to the Information Desk on the 1st floor.    CONTACT YOUR PHYSICIAN'S OFFICE THE DAY PRIOR TO YOUR SURGERY TO OBTAIN YOUR ARRIVAL TIME.     The evening before surgery do not eat anything after 9 p.m. ( this includes hard candy, chewing gum and mints).  You may only have GATORADE, POWERADE AND WATER  from 9 p.m. until you leave your home.   DO NOT DRINK ANY LIQUIDS ON THE WAY TO THE HOSPITAL.      Why does your anesthesiologist allow you to drink Gatorade/Powerade before surgery?  Gatorade/Powerade helps to increase your comfort before surgery and to decrease your nausea after surgery. The carbohydrates in Gatorade/Powerade help reduce your body's stress response to surgery.  If you are a diabetic-drink only water prior to surgery.    Outpatient Surgery- May allow 2 adult (18 and older) Support Persons (1 being the designated ) for all surgical/procedural patients. A breastfeeding mother will be allowed her infant and 2 adult Support Persons. No one under the age of 18 will be allowed in the building.      SPECIAL MEDICATION INSTRUCTIONS: TAKE medications checked off by the Anesthesiologist on your Medication List.    Angiogram Patients: Take medications as instructed by your physician, including aspirin.     Surgery Patients:    If you take ASPIRIN - Your PHYSICIAN/SURGEON will need to inform you IF/OR when you need to stop taking aspirin prior to your surgery.     The week prior to surgery do not ot take any medications containing IBUPROFEN or NSAIDS ( Advil, Motrin, Goodys, BC, Aleve, Naproxen etc) If you are not sure if you should take a medicine  please call your surgeon's office.  Ok to take Tylenol    Do Not Wear any make-up (especially eye make-up) to surgery. Please remove any false eyelashes or eyelash extensions. If you arrive the day of surgery with makeup/eyelashes on you will be required to remove prior to surgery. (There is a risk of corneal abrasions if eye makeup/eyelash extensions are not removed)      Leave all valuables at home.   Do Not wear any jewelry or watches, including any metal in body piercings. Jewelry must be removed prior to coming to the hospital.  There is a possibility that rings that are unable to be removed may be cut off if they are on the surgical extremity.    Please remove all hair extensions, wigs, clips and any other metal accessories/ ornaments from your hair.  These items may pose a flammable/fire risk in Surgery and must be removed.    Do not shave your surgical area at least 5 days prior to your surgery. The surgical prep will be performed at the hospital according to Infection Control regulations.    Contact Lens must be removed before surgery. Either do not wear the contact lens or bring a case and solution for storage.  Please bring a container for eyeglasses or dentures as required.  Bring any paperwork your physician has provided, such as consent forms,  history and physicals, doctor's orders, etc.   Bring comfortable clothes that are loose fitting to wear upon discharge. Take into consideration the type of surgery being performed.  Maintain your diet as advised per your physician the day prior to surgery.      Adequate rest the night before surgery is advised.   Park in the Parking lot behind the hospital or in the [facility] across the street from the parking lot. Parking is complimentary.  If you will be discharged the same day as your procedure, please arrange for a responsible adult to drive you home or to accompany you if traveling by taxi.   YOU WILL NOT BE PERMITTED TO DRIVE OR TO LEAVE THE  HOSPITAL ALONE AFTER SURGERY.   If you are being discharged the same day, it is strongly recommended that you arrange for someone to remain with you for the first 24 hrs following your surgery.    The Surgeon will speak to your family/visitor after your surgery regarding the outcome of your surgery and post op care.  The Surgeon may speak to you after your surgery, but there is a possibility you may not remember the details.  Please check with your family members regarding the conversation with the Surgeon.    We strongly recommend whoever is bringing you home be present for discharge instructions.  This will ensure a thorough understanding for your post op home care.          Thank you for your cooperation.  The Staff of [facility].            Bathing Instructions with Hibiclens    Shower the evening before and morning of your procedure with Chlorhexidine (Hibiclens)  do not use Chlorhexidine on your face or genitals. Do not get in your eyes.  Wash your face with water and your regular face wash/soap  Use your regular shampoo  Apply Chlorhexidine (Hibiclens) directly on your skin or on a wet washcloth and wash gently. When showering: Move away from the shower stream when applying Chlorhexidine (Hibiclens) to avoid rinsing off too soon.  Rinse thoroughly with warm water  Do not dilute Chlorhexidine (Hibiclens)   Dry off as usual, do not use any deodorant, powder, body lotions, perfume, after shave or cologne.

## 2024-02-28 ENCOUNTER — HOSPITAL ENCOUNTER (OUTPATIENT)
Dept: RADIOLOGY | Facility: HOSPITAL | Age: 54
Discharge: HOME | End: 2024-02-28
Attending: SURGERY
Payer: COMMERCIAL

## 2024-02-28 DIAGNOSIS — Z17.1 MALIGNANT NEOPLASM OF UPPER-INNER QUADRANT OF LEFT BREAST IN FEMALE, ESTROGEN RECEPTOR NEGATIVE: ICD-10-CM

## 2024-02-28 DIAGNOSIS — C50.212 MALIGNANT NEOPLASM OF UPPER-INNER QUADRANT OF LEFT BREAST IN FEMALE, ESTROGEN RECEPTOR NEGATIVE: ICD-10-CM

## 2024-02-28 PROCEDURE — 19285 PERQ DEV BREAST 1ST US IMAG: CPT | Mod: LT,,, | Performed by: RADIOLOGY

## 2024-02-28 PROCEDURE — 77065 DX MAMMO INCL CAD UNI: CPT | Mod: TC,LT

## 2024-02-28 PROCEDURE — 25000003 PHARM REV CODE 250: Performed by: SURGERY

## 2024-02-28 PROCEDURE — A4648 IMPLANTABLE TISSUE MARKER: HCPCS

## 2024-02-28 PROCEDURE — 77065 DX MAMMO INCL CAD UNI: CPT | Mod: 26,LT,, | Performed by: RADIOLOGY

## 2024-02-28 RX ORDER — LIDOCAINE HYDROCHLORIDE 20 MG/ML
10 INJECTION, SOLUTION INFILTRATION; PERINEURAL ONCE
Status: COMPLETED | OUTPATIENT
Start: 2024-02-28 | End: 2024-02-28

## 2024-02-28 RX ADMIN — LIDOCAINE HYDROCHLORIDE 10 ML: 20 INJECTION, SOLUTION INFILTRATION; PERINEURAL at 09:02

## 2024-03-05 ENCOUNTER — TELEPHONE (OUTPATIENT)
Dept: SURGERY | Facility: CLINIC | Age: 54
End: 2024-03-05
Payer: COMMERCIAL

## 2024-03-05 NOTE — TELEPHONE ENCOUNTER
Confirmed arrival time for surgery on 03/6/24 at the [facility] Location. Arrival time is for 10 am surgery is for 12 noon . Nothing to eat after 9 pm this evening 3/05/24. Clear liquids Gatorade up until patient leaves home on the morning of surgery. Please leave all jewelry home also please wear a button down shirt.  voiced understanding to this call.

## 2024-03-06 ENCOUNTER — ANESTHESIA (OUTPATIENT)
Dept: SURGERY | Facility: OTHER | Age: 54
End: 2024-03-06
Payer: COMMERCIAL

## 2024-03-06 ENCOUNTER — HOSPITAL ENCOUNTER (OUTPATIENT)
Dept: RADIOLOGY | Facility: OTHER | Age: 54
Discharge: HOME | End: 2024-03-06
Attending: SURGERY | Admitting: SURGERY
Payer: COMMERCIAL

## 2024-03-06 ENCOUNTER — HOSPITAL ENCOUNTER (OUTPATIENT)
Facility: OTHER | Age: 54
Discharge: HOME | End: 2024-03-06
Attending: SURGERY | Admitting: SURGERY
Payer: COMMERCIAL

## 2024-03-06 DIAGNOSIS — Z17.1 MALIGNANT NEOPLASM OF UPPER-INNER QUADRANT OF LEFT BREAST IN FEMALE, ESTROGEN RECEPTOR NEGATIVE: ICD-10-CM

## 2024-03-06 DIAGNOSIS — C50.212 MALIGNANT NEOPLASM OF UPPER-INNER QUADRANT OF LEFT BREAST IN FEMALE, ESTROGEN RECEPTOR NEGATIVE: ICD-10-CM

## 2024-03-06 PROCEDURE — 88342 IMHCHEM/IMCYTCHM 1ST ANTB: CPT | Mod: 26,,, | Performed by: STUDENT IN AN ORGANIZED HEALTH CARE EDUCATION/TRAINING PROGRAM

## 2024-03-06 PROCEDURE — 88307 TISSUE EXAM BY PATHOLOGIST: CPT | Mod: 26,,, | Performed by: STUDENT IN AN ORGANIZED HEALTH CARE EDUCATION/TRAINING PROGRAM

## 2024-03-06 PROCEDURE — D9220A PRA ANESTHESIA: Mod: CRNA,,, | Performed by: STUDENT IN AN ORGANIZED HEALTH CARE EDUCATION/TRAINING PROGRAM

## 2024-03-06 PROCEDURE — D9220A PRA ANESTHESIA: Mod: ANES,,, | Performed by: ANESTHESIOLOGY

## 2024-03-06 PROCEDURE — 19318 BREAST REDUCTION: CPT | Mod: 50,,, | Performed by: SURGERY

## 2024-03-06 PROCEDURE — 37000009 HC ANESTHESIA EA ADD 15 MINS: Performed by: SURGERY

## 2024-03-06 PROCEDURE — 88341 IMHCHEM/IMCYTCHM EA ADD ANTB: CPT | Performed by: STUDENT IN AN ORGANIZED HEALTH CARE EDUCATION/TRAINING PROGRAM

## 2024-03-06 PROCEDURE — 63600175 PHARM REV CODE 636 W HCPCS: Performed by: NURSE ANESTHETIST, CERTIFIED REGISTERED

## 2024-03-06 PROCEDURE — 25000003 PHARM REV CODE 250: Performed by: ANESTHESIOLOGY

## 2024-03-06 PROCEDURE — 63600175 PHARM REV CODE 636 W HCPCS: Performed by: SURGERY

## 2024-03-06 PROCEDURE — 71000039 HC RECOVERY, EACH ADD'L HOUR: Performed by: SURGERY

## 2024-03-06 PROCEDURE — 88341 IMHCHEM/IMCYTCHM EA ADD ANTB: CPT | Mod: 26,,, | Performed by: STUDENT IN AN ORGANIZED HEALTH CARE EDUCATION/TRAINING PROGRAM

## 2024-03-06 PROCEDURE — 37000008 HC ANESTHESIA 1ST 15 MINUTES: Performed by: SURGERY

## 2024-03-06 PROCEDURE — C9290 INJ, BUPIVACAINE LIPOSOME: HCPCS | Performed by: SURGERY

## 2024-03-06 PROCEDURE — 27201423 OPTIME MED/SURG SUP & DEVICES STERILE SUPPLY: Performed by: SURGERY

## 2024-03-06 PROCEDURE — 36000707: Performed by: SURGERY

## 2024-03-06 PROCEDURE — 88331 PATH CONSLTJ SURG 1 BLK 1SPC: CPT | Mod: 26,,, | Performed by: STUDENT IN AN ORGANIZED HEALTH CARE EDUCATION/TRAINING PROGRAM

## 2024-03-06 PROCEDURE — 63600175 PHARM REV CODE 636 W HCPCS: Mod: JG | Performed by: SURGERY

## 2024-03-06 PROCEDURE — 76098 X-RAY EXAM SURGICAL SPECIMEN: CPT | Mod: TC

## 2024-03-06 PROCEDURE — 63600175 PHARM REV CODE 636 W HCPCS: Performed by: ANESTHESIOLOGY

## 2024-03-06 PROCEDURE — 76098 X-RAY EXAM SURGICAL SPECIMEN: CPT | Mod: 26,,, | Performed by: RADIOLOGY

## 2024-03-06 PROCEDURE — 88342 IMHCHEM/IMCYTCHM 1ST ANTB: CPT | Mod: 59 | Performed by: STUDENT IN AN ORGANIZED HEALTH CARE EDUCATION/TRAINING PROGRAM

## 2024-03-06 PROCEDURE — 63600175 PHARM REV CODE 636 W HCPCS: Performed by: STUDENT IN AN ORGANIZED HEALTH CARE EDUCATION/TRAINING PROGRAM

## 2024-03-06 PROCEDURE — 38525 BIOPSY/REMOVAL LYMPH NODES: CPT | Mod: 51,LT,, | Performed by: SURGERY

## 2024-03-06 PROCEDURE — 88307 TISSUE EXAM BY PATHOLOGIST: CPT | Performed by: STUDENT IN AN ORGANIZED HEALTH CARE EDUCATION/TRAINING PROGRAM

## 2024-03-06 PROCEDURE — 38900 IO MAP OF SENT LYMPH NODE: CPT | Mod: LT,,, | Performed by: SURGERY

## 2024-03-06 PROCEDURE — 25000003 PHARM REV CODE 250: Performed by: SURGERY

## 2024-03-06 PROCEDURE — 88305 TISSUE EXAM BY PATHOLOGIST: CPT | Mod: 26,,, | Performed by: STUDENT IN AN ORGANIZED HEALTH CARE EDUCATION/TRAINING PROGRAM

## 2024-03-06 PROCEDURE — 63600175 PHARM REV CODE 636 W HCPCS: Mod: JZ,JG | Performed by: SURGERY

## 2024-03-06 PROCEDURE — 71000016 HC POSTOP RECOV ADDL HR: Performed by: SURGERY

## 2024-03-06 PROCEDURE — 19301 PARTIAL MASTECTOMY: CPT | Mod: LT,,, | Performed by: SURGERY

## 2024-03-06 PROCEDURE — 25000003 PHARM REV CODE 250: Performed by: STUDENT IN AN ORGANIZED HEALTH CARE EDUCATION/TRAINING PROGRAM

## 2024-03-06 PROCEDURE — 71000015 HC POSTOP RECOV 1ST HR: Performed by: SURGERY

## 2024-03-06 PROCEDURE — 36000706: Performed by: SURGERY

## 2024-03-06 PROCEDURE — 25000003 PHARM REV CODE 250: Performed by: NURSE ANESTHETIST, CERTIFIED REGISTERED

## 2024-03-06 PROCEDURE — A9520 TC99 TILMANOCEPT DIAG 0.5MCI: HCPCS | Performed by: SURGERY

## 2024-03-06 PROCEDURE — 71000033 HC RECOVERY, INTIAL HOUR: Performed by: SURGERY

## 2024-03-06 PROCEDURE — 88331 PATH CONSLTJ SURG 1 BLK 1SPC: CPT | Mod: 59 | Performed by: STUDENT IN AN ORGANIZED HEALTH CARE EDUCATION/TRAINING PROGRAM

## 2024-03-06 PROCEDURE — 88305 TISSUE EXAM BY PATHOLOGIST: CPT | Performed by: STUDENT IN AN ORGANIZED HEALTH CARE EDUCATION/TRAINING PROGRAM

## 2024-03-06 PROCEDURE — C1729 CATH, DRAINAGE: HCPCS | Performed by: SURGERY

## 2024-03-06 RX ORDER — ISOSULFAN BLUE 50 MG/5ML
INJECTION, SOLUTION SUBCUTANEOUS
Status: DISCONTINUED | OUTPATIENT
Start: 2024-03-06 | End: 2024-03-06 | Stop reason: HOSPADM

## 2024-03-06 RX ORDER — GABAPENTIN 300 MG/1
300 CAPSULE ORAL 3 TIMES DAILY
Qty: 15 CAPSULE | Refills: 0 | Status: SHIPPED | OUTPATIENT
Start: 2024-03-06 | End: 2024-03-14 | Stop reason: SDUPTHER

## 2024-03-06 RX ORDER — DEXAMETHASONE SODIUM PHOSPHATE 4 MG/ML
INJECTION, SOLUTION INTRA-ARTICULAR; INTRALESIONAL; INTRAMUSCULAR; INTRAVENOUS; SOFT TISSUE
Status: DISCONTINUED | OUTPATIENT
Start: 2024-03-06 | End: 2024-03-06

## 2024-03-06 RX ORDER — PHENYLEPHRINE HYDROCHLORIDE 10 MG/ML
INJECTION INTRAVENOUS
Status: DISCONTINUED | OUTPATIENT
Start: 2024-03-06 | End: 2024-03-06

## 2024-03-06 RX ORDER — OXYCODONE HYDROCHLORIDE 5 MG/1
5 TABLET ORAL EVERY 4 HOURS PRN
Qty: 10 TABLET | Refills: 0 | Status: SHIPPED | OUTPATIENT
Start: 2024-03-06

## 2024-03-06 RX ORDER — ACETAMINOPHEN 500 MG
1000 TABLET ORAL EVERY 8 HOURS
Qty: 28 TABLET | Refills: 0
Start: 2024-03-06 | End: 2024-03-13

## 2024-03-06 RX ORDER — LIDOCAINE HYDROCHLORIDE 10 MG/ML
0.5 INJECTION, SOLUTION EPIDURAL; INFILTRATION; INTRACAUDAL; PERINEURAL ONCE
Status: DISCONTINUED | OUTPATIENT
Start: 2024-03-06 | End: 2024-03-06 | Stop reason: HOSPADM

## 2024-03-06 RX ORDER — ONDANSETRON HYDROCHLORIDE 2 MG/ML
INJECTION, SOLUTION INTRAVENOUS
Status: DISCONTINUED | OUTPATIENT
Start: 2024-03-06 | End: 2024-03-06

## 2024-03-06 RX ORDER — SODIUM CHLORIDE, SODIUM LACTATE, POTASSIUM CHLORIDE, CALCIUM CHLORIDE 600; 310; 30; 20 MG/100ML; MG/100ML; MG/100ML; MG/100ML
INJECTION, SOLUTION INTRAVENOUS CONTINUOUS
Status: DISCONTINUED | OUTPATIENT
Start: 2024-03-06 | End: 2024-03-06 | Stop reason: HOSPADM

## 2024-03-06 RX ORDER — OXYCODONE HYDROCHLORIDE 5 MG/1
5 TABLET ORAL ONCE
Status: COMPLETED | OUTPATIENT
Start: 2024-03-06 | End: 2024-03-06

## 2024-03-06 RX ORDER — SODIUM CHLORIDE 9 MG/ML
INJECTION, SOLUTION INTRAVENOUS CONTINUOUS
Status: DISCONTINUED | OUTPATIENT
Start: 2024-03-06 | End: 2024-03-06 | Stop reason: HOSPADM

## 2024-03-06 RX ORDER — MEPERIDINE HYDROCHLORIDE 25 MG/ML
12.5 INJECTION INTRAMUSCULAR; INTRAVENOUS; SUBCUTANEOUS ONCE AS NEEDED
Status: DISCONTINUED | OUTPATIENT
Start: 2024-03-06 | End: 2024-03-06 | Stop reason: HOSPADM

## 2024-03-06 RX ORDER — KETAMINE HCL IN 0.9 % NACL 50 MG/5 ML
SYRINGE (ML) INTRAVENOUS
Status: DISCONTINUED | OUTPATIENT
Start: 2024-03-06 | End: 2024-03-06

## 2024-03-06 RX ORDER — ONDANSETRON HYDROCHLORIDE 2 MG/ML
4 INJECTION, SOLUTION INTRAVENOUS DAILY PRN
Status: DISCONTINUED | OUTPATIENT
Start: 2024-03-06 | End: 2024-03-06 | Stop reason: HOSPADM

## 2024-03-06 RX ORDER — METHOCARBAMOL 500 MG/1
500 TABLET, FILM COATED ORAL EVERY 8 HOURS
Qty: 15 TABLET | Refills: 0 | Status: SHIPPED | OUTPATIENT
Start: 2024-03-06 | End: 2024-03-11

## 2024-03-06 RX ORDER — OXYCODONE HYDROCHLORIDE 5 MG/1
5 TABLET ORAL
Status: DISCONTINUED | OUTPATIENT
Start: 2024-03-06 | End: 2024-03-06 | Stop reason: HOSPADM

## 2024-03-06 RX ORDER — MIDAZOLAM HYDROCHLORIDE 1 MG/ML
INJECTION INTRAMUSCULAR; INTRAVENOUS
Status: DISCONTINUED | OUTPATIENT
Start: 2024-03-06 | End: 2024-03-06

## 2024-03-06 RX ORDER — ACETAMINOPHEN 500 MG
1000 TABLET ORAL
Status: COMPLETED | OUTPATIENT
Start: 2024-03-06 | End: 2024-03-06

## 2024-03-06 RX ORDER — SODIUM CHLORIDE 0.9 % (FLUSH) 0.9 %
3 SYRINGE (ML) INJECTION
Status: DISCONTINUED | OUTPATIENT
Start: 2024-03-06 | End: 2024-03-06 | Stop reason: HOSPADM

## 2024-03-06 RX ORDER — PREGABALIN 75 MG/1
75 CAPSULE ORAL ONCE
Status: COMPLETED | OUTPATIENT
Start: 2024-03-06 | End: 2024-03-06

## 2024-03-06 RX ORDER — ROCURONIUM BROMIDE 10 MG/ML
INJECTION, SOLUTION INTRAVENOUS
Status: DISCONTINUED | OUTPATIENT
Start: 2024-03-06 | End: 2024-03-06

## 2024-03-06 RX ORDER — HYDROMORPHONE HYDROCHLORIDE 2 MG/ML
0.4 INJECTION, SOLUTION INTRAMUSCULAR; INTRAVENOUS; SUBCUTANEOUS EVERY 5 MIN PRN
Status: DISCONTINUED | OUTPATIENT
Start: 2024-03-06 | End: 2024-03-06 | Stop reason: HOSPADM

## 2024-03-06 RX ORDER — BUPIVACAINE HYDROCHLORIDE 2.5 MG/ML
INJECTION, SOLUTION EPIDURAL; INFILTRATION; INTRACAUDAL
Status: DISCONTINUED | OUTPATIENT
Start: 2024-03-06 | End: 2024-03-06 | Stop reason: HOSPADM

## 2024-03-06 RX ORDER — FENTANYL CITRATE 50 UG/ML
INJECTION, SOLUTION INTRAMUSCULAR; INTRAVENOUS
Status: DISCONTINUED | OUTPATIENT
Start: 2024-03-06 | End: 2024-03-06

## 2024-03-06 RX ORDER — IBUPROFEN 600 MG/1
600 TABLET ORAL EVERY 6 HOURS
Qty: 28 TABLET | Refills: 0 | Status: SHIPPED | OUTPATIENT
Start: 2024-03-06 | End: 2024-03-13

## 2024-03-06 RX ORDER — LIDOCAINE HYDROCHLORIDE 20 MG/ML
INJECTION, SOLUTION EPIDURAL; INFILTRATION; INTRACAUDAL; PERINEURAL
Status: DISCONTINUED | OUTPATIENT
Start: 2024-03-06 | End: 2024-03-06

## 2024-03-06 RX ORDER — PROPOFOL 10 MG/ML
VIAL (ML) INTRAVENOUS
Status: DISCONTINUED | OUTPATIENT
Start: 2024-03-06 | End: 2024-03-06

## 2024-03-06 RX ADMIN — Medication 25 MG: at 01:03

## 2024-03-06 RX ADMIN — ACETAMINOPHEN 1000 MG: 500 TABLET ORAL at 10:03

## 2024-03-06 RX ADMIN — PHENYLEPHRINE HYDROCHLORIDE 200 MCG: 10 INJECTION INTRAVENOUS at 02:03

## 2024-03-06 RX ADMIN — PHENYLEPHRINE HYDROCHLORIDE 100 MCG: 10 INJECTION INTRAVENOUS at 12:03

## 2024-03-06 RX ADMIN — ROCURONIUM BROMIDE 50 MG: 10 INJECTION INTRAVENOUS at 11:03

## 2024-03-06 RX ADMIN — SODIUM CHLORIDE, SODIUM LACTATE, POTASSIUM CHLORIDE, AND CALCIUM CHLORIDE: .6; .31; .03; .02 INJECTION, SOLUTION INTRAVENOUS at 10:03

## 2024-03-06 RX ADMIN — PROPOFOL 150 MG: 10 INJECTION, EMULSION INTRAVENOUS at 11:03

## 2024-03-06 RX ADMIN — LIDOCAINE HYDROCHLORIDE 80 MG: 20 INJECTION, SOLUTION EPIDURAL; INFILTRATION; INTRACAUDAL; PERINEURAL at 11:03

## 2024-03-06 RX ADMIN — PROPOFOL 50 MG: 10 INJECTION, EMULSION INTRAVENOUS at 01:03

## 2024-03-06 RX ADMIN — MIDAZOLAM HYDROCHLORIDE 2 MG: 1 INJECTION, SOLUTION INTRAMUSCULAR; INTRAVENOUS at 11:03

## 2024-03-06 RX ADMIN — PHENYLEPHRINE HYDROCHLORIDE 100 MCG: 10 INJECTION INTRAVENOUS at 01:03

## 2024-03-06 RX ADMIN — FENTANYL CITRATE 50 MCG: 0.05 INJECTION, SOLUTION INTRAMUSCULAR; INTRAVENOUS at 01:03

## 2024-03-06 RX ADMIN — DEXAMETHASONE SODIUM PHOSPHATE 8 MG: 4 INJECTION, SOLUTION INTRAMUSCULAR; INTRAVENOUS at 11:03

## 2024-03-06 RX ADMIN — PREGABALIN 75 MG: 75 CAPSULE ORAL at 10:03

## 2024-03-06 RX ADMIN — FENTANYL CITRATE 50 MCG: 0.05 INJECTION, SOLUTION INTRAMUSCULAR; INTRAVENOUS at 12:03

## 2024-03-06 RX ADMIN — TILMANOCEPT 0.54 MILLICURIE: KIT at 12:03

## 2024-03-06 RX ADMIN — CEFAZOLIN 2 G: 2 INJECTION, POWDER, FOR SOLUTION INTRAMUSCULAR; INTRAVENOUS at 11:03

## 2024-03-06 RX ADMIN — OXYCODONE 5 MG: 5 TABLET ORAL at 03:03

## 2024-03-06 RX ADMIN — ROCURONIUM BROMIDE 20 MG: 10 INJECTION INTRAVENOUS at 01:03

## 2024-03-06 RX ADMIN — FENTANYL CITRATE 100 MCG: 0.05 INJECTION, SOLUTION INTRAMUSCULAR; INTRAVENOUS at 11:03

## 2024-03-06 RX ADMIN — PROPOFOL 40 MG: 10 INJECTION, EMULSION INTRAVENOUS at 12:03

## 2024-03-06 RX ADMIN — FENTANYL CITRATE 100 MCG: 0.05 INJECTION, SOLUTION INTRAMUSCULAR; INTRAVENOUS at 03:03

## 2024-03-06 RX ADMIN — ONDANSETRON HYDROCHLORIDE 4 MG: 2 INJECTION INTRAMUSCULAR; INTRAVENOUS at 02:03

## 2024-03-06 RX ADMIN — SUGAMMADEX 200 MG: 100 INJECTION, SOLUTION INTRAVENOUS at 03:03

## 2024-03-06 RX ADMIN — PHENYLEPHRINE HYDROCHLORIDE 100 MCG: 10 INJECTION INTRAVENOUS at 11:03

## 2024-03-06 RX ADMIN — HYDROMORPHONE HYDROCHLORIDE 0.4 MG: 2 INJECTION INTRAMUSCULAR; INTRAVENOUS; SUBCUTANEOUS at 04:03

## 2024-03-06 RX ADMIN — HYDROMORPHONE HYDROCHLORIDE 0.4 MG: 2 INJECTION INTRAMUSCULAR; INTRAVENOUS; SUBCUTANEOUS at 03:03

## 2024-03-06 RX ADMIN — OXYCODONE HYDROCHLORIDE 5 MG: 5 TABLET ORAL at 05:03

## 2024-03-06 RX ADMIN — GLYCOPYRROLATE 0.2 MG: 0.2 INJECTION, SOLUTION INTRAMUSCULAR; INTRAVITREAL at 11:03

## 2024-03-06 RX ADMIN — CEFAZOLIN 2 G: 2 INJECTION, POWDER, FOR SOLUTION INTRAMUSCULAR; INTRAVENOUS at 03:03

## 2024-03-06 NOTE — TRANSFER OF CARE
"Anesthesia Transfer of Care Note    Patient: Teresa Valdes    Procedure(s) Performed: Procedure(s) (LRB):  LUMPECTOMY,BREAST,WITH RADIOACTIVE SEED LOCALIZATION / RADIOLOGICAL MARKER (Left)  BIOPSY, LYMPH NODE, SENTINEL (Left)  INJECTION, FOR SENTINEL NODE IDENTIFICATION (Left)  MAMMOPLASTY, REDUCTION, BILATERAL / ONCOPLASTIC REDUCTION (Bilateral)    Patient location: PACU    Anesthesia Type: general    Transport from OR: Transported from OR on 2-3 L/min O2 by NC with adequate spontaneous ventilation    Post pain: adequate analgesia    Post assessment: no apparent anesthetic complications    Post vital signs: stable    Level of consciousness: awake    Nausea/Vomiting: no nausea/vomiting    Complications: none    Transfer of care protocol was followed    Last vitals: Visit Vitals  /75 (BP Location: Right arm, Patient Position: Lying)   Pulse 71   Temp 36.9 °C (98.5 °F) (Skin)   Resp 18   Ht 5' 6" (1.676 m)   Wt 83.9 kg (185 lb)   LMP 10/15/2018 (Within Weeks)   SpO2 97%   Breastfeeding No   BMI 29.86 kg/m²     "

## 2024-03-06 NOTE — ANESTHESIA POSTPROCEDURE EVALUATION
Anesthesia Post Evaluation    Patient: Teresa Valdes    Procedure(s) Performed: Procedure(s) (LRB):  LUMPECTOMY,BREAST,WITH RADIOACTIVE SEED LOCALIZATION / RADIOLOGICAL MARKER (Left)  BIOPSY, LYMPH NODE, SENTINEL (Left)  INJECTION, FOR SENTINEL NODE IDENTIFICATION (Left)  MAMMOPLASTY, REDUCTION, BILATERAL / ONCOPLASTIC REDUCTION (Bilateral)    Final Anesthesia Type: general      Patient location during evaluation: PACU  Patient participation: Yes- Able to Participate  Level of consciousness: awake and alert  Post-procedure vital signs: reviewed and stable  Pain management: adequate  Airway patency: patent    PONV status at discharge: No PONV  Anesthetic complications: no      Cardiovascular status: blood pressure returned to baseline  Respiratory status: unassisted and spontaneous ventilation  Hydration status: euvolemic  Follow-up not needed.              Vitals Value Taken Time   /83 03/06/24 1601   Temp 36.9 °C (98.5 °F) 03/06/24 1538   Pulse 79 03/06/24 1614   Resp 18 03/06/24 1538   SpO2 99 % 03/06/24 1614   Vitals shown include unvalidated device data.      No case tracking events are documented in the log.      Pain/Juan David Score: Pain Rating Prior to Med Admin: 7 (3/6/2024  3:48 PM)  Juan David Score: 8 (3/6/2024  3:38 PM)

## 2024-03-06 NOTE — OP NOTE
[facility]  Plastic Surgery  Operative Note    SUMMARY     Date of Procedure: 3/6/2024     Location:  [facility]    Procedure(s): Procedure(s) (LRB):  LUMPECTOMY,BREAST,WITH RADIOACTIVE SEED LOCALIZATION / RADIOLOGICAL MARKER (Left)  BIOPSY, LYMPH NODE, SENTINEL (Left)  INJECTION, FOR SENTINEL NODE IDENTIFICATION (Left)    Left oncoplastic breast reduction   2.   Right Breast reduction  3.   Bilateral intercostal rib blocks    Surgeon(s) and Role:  Panel 1:     * Keke Sequeira MD - Primary  Panel 2:     * Demond Patel DO - Primary    Assistant: DANIELLE Verdugo and Keny Peters MD, Fellow    Pre-Operative Diagnosis: Malignant neoplasm of upper-inner quadrant of left female breast, unspecified estrogen receptor status [C50.212]    Post-Operative Diagnosis: same    Anesthesia: General    Indications for Procedure: Teresa Valdes 53-year-old woman with left breast cancer.  Of note she has a significant breast ptosis and a history 175 lb weight loss.  She chose to undergo lumpectomy.  She was a good candidate for oncoplastic breast reduction.  We discussed a wise pattern small volume reduction for her    Operative Findings (including complications, if any):  Wise pattern, inferior pedicle breast reduction.    Right mastectomy weight 170 gm  Left mastectomy weight 102 gm (58gm was lumpectomy specimen)    Description of Procedures:  The patient was seen in the preoperative holding area.  We discussed postoperative recovery and expectations.  All questions were answered.  Surgical and photographic consents were signed at her preoperative visit.  Both breasts were marked for a wise pattern skin reduction.      Patient was taken to the operating room general anesthesia was induced.  Both breasts were prepped and draped in the usual sterile fashion.  A time-out was performed.    Through the marroquin pattern incision Dr. Sequeira performed a left superior lumpectomy and an axillary  sentinel lymph node biopsy..  I entered the room after the portion of the was complete    An inferior pedicle was designed, centered on the areola with a 10 cm width at the base.  A small wedge was drawn at the T point along the IMF.    Mixture of 0.5% lidocaine with epinephrine was injected along the incisions as well as along the tender planes of dissection.  A 38 mm cookie cutter was used around the areola.  Using a 15 blade the dermis around the areola was incised and the inferior pedicel was de epithelialized.  Next using a 10 blade scalpel the wise pattern skin incisions were incised.  We began on the superior medial flap.  Flap was created about 1-1/2 cm thickness.  This dissection was carried down at this level until we reached the chest wall.  Once this was done the superolateral flap was elevated in a similar fashion.  Once down to the chest wall both flaps were lifted off of the chest wall above the Pec fascia to the top of the breast.  Medially the skin was removed with some additional breast tissue.  It was taken at a beveled angle along the medial side of the inferior pedicle.  Then laterally the tissue was removed along the lateral edge of the pedicle straight down to the chest wall.  All lateral tissue within the skin pattern was excised.  Finally the skin above the inferior pedicle with its underlying breast tissue was incised and 1 large specimen was passed off.  Some additional tissue was removed from the pedicle and the flaps as needed for uneven contour and to allow adequate excursion of the flaps over the inferior pedicle.  T  This was done for both breasts.    Only a small amount of tissue superiorly it was removed on the left.  The left lateral tissue was elevated off the chest wall and allowed to move centrally to help replace the lost volume.  On the right side majority of the tissue removed was above the inferior pedicle and the small amount of breast tissue lateral to the pedicle.    2-0  Vicryl was then used the T point to bring the flap over the pedicle.  The breast was temporarily stapled closed.  The patient was then sat up and we are able to compare sizes between what right and left breast.  Lateral dog ears were stapled and marked.      The patient was then laid back flat.  Lateral dog ears were excised.  Both breasts were reopened.  The breasts were irrigated and any loose fat was removed.  A 15 Upper sorbian drain was brought out of the anterior axillary line on both sides and was laid in the axilla and above the inferior pedicle.  They were sewn in place with 3-0 nylon sutures.    Next the T point was closed with 3 2-0 Vicryl sutures.  The areola was tucked into position.  On the breast was again temporarily stapled.  The deep dermis of the horizontal and vertical incision were closed with buried 3-0 Monocryl sutures.  Subcuticular layer of the horizontal limb was closed with 3-0 Stratafix.  The vertical limb was closed with 3-0 Stratafix.  A 38 cookie cutter was laid on the breast in the appropriate position for the areola.  The IMF to areolar distance was 5.5.  This was incised with a knife.  The skin was removed full-thickness using the Bovie and the underlying subcutaneous fat was also debulked.  The dermis around the areola was anchored to the dermis with a few interrupted 3-0 Monocryl sutures.  The areola was then inset using half buried 4-0 Prolene horizontal mattress sutures.  Subcuticular was closed with a running 4-0 Stratafix. Steri-strips were placed around the areolar incisions and the remaining incisions were dressed with Prineo tape. Drains were placed to suction and dressed with Tegaderm CHG drain dressings.     The patient was then placed in a postsurgical bra and padded with ABD pads and a Kerlix in each axilla. She tolerated the procedure well taken recovery in stable condition.      Significant Surgical Tasks Conducted by the Assistant(s), if Applicable: The indicated resident  served as first assistant for this procedure.    Estimated Blood Loss (EBL): 50mL           Implants: * No implants in log *    Specimens:   Specimen (24h ago, onward)       Start     Ordered    03/06/24 1408  Specimen to Pathology, Surgery Breast  Once        Comments: Pre-op Diagnosis: Malignant neoplasm of upper-inner quadrant of left female breast, unspecified estrogen receptor status [C50.212]Procedure(s):LUMPECTOMY,BREAST,WITH RADIOACTIVE SEED LOCALIZATION / RADIOLOGICAL MARKERBIOPSY, LYMPH NODE, SENTINELINJECTION, FOR SENTINEL NODE IDENTIFICATIONMAMMOPLASTY, REDUCTION, BILATERAL / ONCOPLASTIC REDUCTION Number of specimens: 9Name of specimens: 1. Left lumpectomy, green inferior, blue superior, orange lateral, yellow anterior, black posterior, red medial 2. Left axillary sentinel lymph node, hot 2209 (frozen)3. Left axillary sentinel lymph node, hot 528 (frozen)4. Left axillary palpable node (frozen)5. Left axillary sentinel lymph node, hot 653 (frozen)6. Left breast, new inferior margin, ink marks new margin7. Left breast, new medial margin, ink marks new margin8. Left breast reduction9. Right breast reduction     References:    Click here for ordering Quick Tip   Question Answer Comment   Procedure Type: Breast    Specimen Class: Known or suspected malignancy    Which provider would you like to cc? PADILLA PERDUE    Which provider would you like to cc? PRANEETH ARANA    Release to patient Immediate        03/06/24 1413                            Condition: Good    Disposition: PACU - hemodynamically stable., DC home    Attestation: I was present and scrubbed for the key portions of the procedure.

## 2024-03-06 NOTE — OP NOTE
DATE OF PROCEDURE: 3/6/2024    SURGEON: Surgeon(s) and Role:  Panel 1:     * Keke Sequeira MD - Primary  Resident: Berto Ramirez    Panel 2:     * Demond Patel DO - Primary    PREOPERATIVE DIAGNOSIS: Invasive breast carcinoma of the left breast upper inner quadrant, estrogen receptor negative    POSTOPERATIVE DIAGNOSIS: same    ANESTHESIA: local and general    PROCEDURES PERFORMED:   1. left breast reflector with ultrasound localization partial mastectomy (lumpectomy) with excision for clear margins   2. left axillary deep sentinel lymph node biopsy   3. injection of left breast with technetium-labeled radiocolloid for sentinel lymph node identification  4. Identification of sentinel lymph node       PROCEDURE IN DETAIL:   The patient underwent informed consent.  The films were reviewed.    She was then brought to the Operating Room and placed in the supine position. local and general anesthesia was administered.    The left breast was injected in the subareolar region with the technetium-labeled radiocolloid. The left breast was further injected with the isosulfan Lymphazurin blue dye in the central subareolar region.  The left breast, anterior chest, arm and axilla were then prepped and draped in a sterile fashion.     Next, we turned our attention to the left breast itself. Ultrasound and reflector guidance was used to identify the breast mass at 10 o'clock.  The area if interest was identified with a clip within and then was marked for localization using ultrasound and reflector guidance.  An incision was made in the upper inner quadrant of the left breast over the anticipated tract of the lesion in a  wise pattern  location.  The specimen was dissected circumferentially around the cancer.  We did dissect all the way down to, and including the underlying pectoralis fascia.  The ultrasound localization lumpectomy specimen was inked on the back table using green ink inferiorly, blue ink superiorly,  orange ink laterally, yellow ink anteriorly, black ink posteriorly, and the red ink medially.  It was then fixed with acetic acid and submitted for specimen radiograph with the Mozart, which confirmed the clip and area of interest within the specimen, and was interpreted in the operating room. Given the appearance and location of the lesion, additional margins were taken including medial and inferior.       Using the gamma probe, activity was noted and localized in the left axilla. We carried up the reduction flap through the wise pattern to the axilla.   We then dissected down through the clavipectoral fascia using electrocautery, identifying a hot afferent lymphatic channel coming from the upper outer quadrant axillary tail of Aguliar breast tissue to a level 1 sentinel node, which was excised. It was dissected circumferentially with blunt dissection and the afferent and efferent lymphatics were tied together. The lymph node was labeled as specimen #1 for permanent sectioning, hot and not blue with an ex vivo count of 2209. A total of 3 axillary sentinel lymph nodes were removed.  The probe was placed in the cavity and there was no significant residual background radioactivity or blue dye noted in the axilla indicating adequate and appropriate sentinel lymph node biopsy. The cavity was then palpated and 1 further palpable nodes that was adjacent was noted. Any additional palpable nodes were sent to pathology for permanent section.   The last node was a level 2 node with count of 653.    We then achieved hemostasis and irrigation was performed.      Frozen section read was benign with some question of the last node with cautery artifact.      Dermabond was applied. Sterile fluff gauze was placed and a post-procedure bra was placed. She tolerated the procedure well without complication and was turned over to Anesthesia for transport to the recovery area in a satisfactory condition. All specimens were sent to  Pathology for permanent sectioning.    ESTIMATED BLOOD LOSS: 10 ml    COMPLICATIONS: none    DISPOSITION:  PACU--hemodynamically stable    ATTESTATION:  I was present and scrubbed for the entire procedure.    [place] Node Biopsy for Breast Cancer - Left  Operation performed with curative intent Yes   Tracer(s) used to identify sentinel nodes in the upfront surgery (non-neoadjuvant) setting N/A   Tracer(s) used to identify sentinel nodes in the neoadjuvant setting Dye and Radioactive tracer   All nodes (colored or non-colored) present at the end of a dye-filled lymphatic channel were removed Yes   All significantly radioactive nodes were removed Yes   All palpably suspicious nodes were removed N/A   Biopsy-proven positive nodes marked with clips prior to chemotherapy were identified and removed N/A

## 2024-03-06 NOTE — H&P
Breast Surgery  [facility]        Chief Complaint: Left Breast Invasive Ductal Carcinoma        Subjective:   Int Hx: Reflector placed, some bruising and tenderness present. MRI on  w complete imaging response.      Patient ID: Teresa Valdes is a 53 y.o. female who presents with triple negative left breast invasive ductal carcinoma s/p neoadjuvant chemotherapy with dose dense doxorubicin and cyclophosphamide.     HPI: Pt noted knot in the left inner breast. Follow-up diagnostic mammogram (23) and ultrasound (23) showed left breast mass 1.9 cm in greatest dimension, complex cystic and solid mass BIRADS 4. A ultrasound guided biopsy and placement of ring marker (twirl) was performed on 23 with pathology revealing infiltrating ductal carcinoma of the left breast. She completed 12 cycles of chemo on 24. After 4 cycles she could not feel the knot in her inner left breast.     Patient does routinely do self breast exams.  Patient has noted a change on breast exam.  Patient denies nipple discharge. Patient denies to previous breast biopsy. Patient denies a personal history of breast cancer.     Genetic testing was negative.     Current 0.5 pack a day smoker. Quit 2 days ago.      Findings at time of MRI on 23:  Tumor size: 1.5 x 1.0 x 1.2 cm, 11 o'clock, 7.6 cm from the nipple, 3.6 cm from chest wall (MRI 23)   Tumor thgthrthathdtheth:th th4th Estrogen Receptor: -   Progesterone Receptor: -   Her-2 sue: -   Lymph node status: clinically negative    Lymphatic invasion: not identified      GYN History:  Age of menarche was 14.  Menopause:48  Patient denies hormonal therapy.   Patient is .   Age of first live birth was 18.   Patient did not use hormone therapy   Patient did not breast feed.          Past Medical History:   Diagnosis Date    Anxiety      Depression      Hypertension       resolved    Ovarian cancer      Sebaceous cyst 2018            Past Surgical History:   Procedure Laterality Date     ADENOIDECTOMY        BREAST CYST EXCISION Right      BREAST SURGERY         Abscess     CHOLECYSTECTOMY        COLONOSCOPY N/A 4/11/2023     Procedure: COLONOSCOPY;  Surgeon: Mona Valverde MD;  Location: [facility];  Service: Endoscopy;  Laterality: N/A;    CYST REMOVAL         on back    cyst removed left wrist        KNEE LIGAMENT RECONSTRUCTION Left      sleeve gastroectomy   06/2017     in [place]    TONSILLECTOMY        Uvuloplasty                 Current Outpatient Medications on File Prior to Visit   Medication Sig Dispense Refill    dexAMETHasone (DECADRON) 4 MG Tab Take 8mg (2 pills) po daily on days 2-4 of chemotherapy. Only for cycles 1-4 40 tablet 0    duke's soln (benadryl 30 mL, mylanta 30 mL, LIDOcaine 30 mL, nystatin 30 mL) 120mL Take 10 mLs by mouth 4 (four) times daily. 120 mL 1    ergocalciferol (ERGOCALCIFEROL) 50,000 unit Cap Take 1 capsule (50,000 Units total) by mouth every 7 days. 12 capsule 3    hydrocortisone (ANUSOL-HC) 2.5 % rectal cream Place rectally 2 (two) times daily. 28 g 1    LIDOcaine-prilocaine (EMLA) cream Apply topically as needed. 5 g 1    multivitamin capsule Take 1 capsule by mouth once daily.        OLANZapine (ZYPREXA) 5 MG tablet Take 5mg nightly on days 1-4 of chemotherapy 30 tablet 2    ondansetron (ZOFRAN) 8 MG tablet Take 1 tablet (8 mg total) by mouth every 12 (twelve) hours as needed for Nausea. 30 tablet 2    promethazine (PHENERGAN) 12.5 MG Tab Take 1 tablet (12.5 mg total) by mouth every 4 (four) hours as needed (nausea). 30 tablet 2    venlafaxine (EFFEXOR) 37.5 MG Tab Take 1 tablet (37.5 mg total) by mouth once daily. 90 tablet 3                Current Facility-Administered Medications on File Prior to Visit   Medication Dose Route Frequency Provider Last Rate Last Admin    [COMPLETED] filgrastim-sndz (ZARXIO) injection 300 mcg/0.5 mL (Preferred Regimen)  300 mcg Subcutaneous 1 time in Clinic/HOD Sonia Johnson MD   300 mcg at 02/05/24 4247     [COMPLETED] gadobenate dimeglumine (MULTIHANCE) injection 15 mL  15 mL Intravenous ONCE PRN Latanya Vines NP   15 mL at 02/05/24 2003      Social History               Socioeconomic History    Marital status:    Occupational History       Employer: [employer]   Tobacco Use    Smoking status: Some Days       Current packs/day: 0.50       Average packs/day: 0.5 packs/day for 6.9 years (3.5 ttl pk-yrs)       Types: Cigarettes       Start date: 3/7/2017    Smokeless tobacco: Never    Tobacco comments:       Working on quitting    Substance and Sexual Activity    Alcohol use: Yes       Alcohol/week: 2.0 standard drinks of alcohol       Types: 2 Glasses of wine per week       Comment: occ    Drug use: Never    Sexual activity: Yes       Partners: Male       Birth control/protection: Partner-Vasectomy       Comment:    Social History Narrative     She teaches Band at middle school. Lives with her  and 2 children in [place] [facility] [place]. She has an older daughter who she gave up for adoption. Exercising.       Social Determinants of Health           Financial Resource Strain: Medium Risk (1/10/2024)     Overall Financial Resource Strain (CARDIA)      Difficulty of Paying Living Expenses: Somewhat hard   Food Insecurity: No Food Insecurity (1/10/2024)     Hunger Vital Sign      Worried About Running Out of Food in the Last Year: Never true      Ran Out of Food in the Last Year: Never true   Transportation Needs: No Transportation Needs (1/10/2024)     PRAPARE - Transportation      Lack of Transportation (Medical): No      Lack of Transportation (Non-Medical): No   Physical Activity: Insufficiently Active (1/10/2024)     Exercise Vital Sign      Days of Exercise per Week: 2 days      Minutes of Exercise per Session: 10 min   Stress: Stress Concern Present (1/10/2024)     [place] - Occupational Stress Questionnaire      Feeling of Stress : To  "some extent   Social Connections: Unknown (1/10/2024)     Social Connection and Isolation Panel [NHANES]      Frequency of Communication with Friends and Family: More than three times a week      Frequency of Social Gatherings with Friends and Family: Twice a week      Active Member of Clubs or Organizations: Yes      Attends Club or Organization Meetings: 1 to 4 times per year      Marital Status:    Housing Stability: Low Risk  (1/10/2024)     Housing Stability Vital Sign      Unable to Pay for Housing in the Last Year: No      Number of Places Lived in the Last Year: 2      Unstable Housing in the Last Year: No               Family History   Problem Relation Age of Onset    Arthritis Mother      Hypertension Father      Heart disease Father      Breast cancer Paternal Grandmother      Cancer Neg Hx           Review of Systems   Constitutional:  Negative for activity change, appetite change and fatigue.   HENT:  Negative for congestion, ear pain and hearing loss.    Eyes:  Negative for pain, discharge and itching.   Respiratory:  Negative for choking, chest tightness and shortness of breath.    Cardiovascular:  Negative for chest pain and palpitations.   Gastrointestinal:  Negative for abdominal distention, abdominal pain, nausea and vomiting.   Genitourinary:  Negative for difficulty urinating, dyspareunia and dysuria.   Skin:  Negative for color change, pallor and rash.   Neurological:  Negative for dizziness, numbness and headaches.      Objective:   /77 (BP Location: Left arm, Patient Position: Sitting, BP Method: Medium (Automatic))   Pulse 86   Ht 5' 6" (1.676 m)   Wt 83.9 kg (185 lb)   LMP 10/15/2018 (Within Weeks)   BMI 29.86 kg/m²      Physical Exam   Cardiovascular:  Normal rate and regular rhythm.            Pulmonary/Chest: Effort normal. Right breast exhibits no inverted nipple, no mass, no nipple discharge, no skin change and no tenderness. Left breast exhibits no inverted nipple, " no mass, no nipple discharge, no skin change and no tenderness.   Abdominal: Soft. Normal appearance.   Musculoskeletal: Normal range of motion. Lymphadenopathy:      Cervical: No cervical adenopathy.      Neurological: She is alert.   Skin: Skin is warm.      Psychiatric: Her behavior is normal. Mood, judgment and thought content normal.         Radiology review: Images personally reviewed by me in the clinic.   MRI 2/5/2024:   Impression:  Left   The no malignancy of the 11 o'clock position of the left breast shows a complete imaging response to neoadjuvant therapy.       Assessment:      Assessment   1. Malignant neoplasm of upper-inner quadrant of left breast in female, estrogen receptor negative          Plan:      To OR for left breast partial mastectomy and left sentinel lymph node biopsy, poss ax dissection, with oncologic reduction    Pt marked and consented

## 2024-03-06 NOTE — ANESTHESIA PROCEDURE NOTES
Intubation    Date/Time: 3/6/2024 11:30 AM    Performed by: Keke Godinez CRNA  Authorized by: Saleem Hernandez MD    Intubation:     Induction:  Intravenous    Intubated:  Postinduction    Mask Ventilation:  Easy mask    Attempts:  1    Attempted By:  CRNA    Method of Intubation:  Video laryngoscopy    Blade:  Valero 3    Laryngeal View Grade: Grade I - full view of cords      Difficult Airway Encountered?: No      Complications:  None    Airway Device:  Oral endotracheal tube    Airway Device Size:  7.5    Style/Cuff Inflation:  Cuffed (inflated to minimal occlusive pressure)    Tube secured:  21    Secured at:  The lips    Placement Verified By:  Capnometry    Complicating Factors:  None    Findings Post-Intubation:  BS equal bilateral and atraumatic/condition of teeth unchanged

## 2024-03-06 NOTE — BRIEF OP NOTE
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 Reflector and clip radiologically confirmed intraoperatively. Specimen sent to path as listed below (one specimen was equivocal). Case turned over to plastics     Estimated Blood Loss: less than 10cc           Specimens:   Specimen (24h ago, onward)       Start     Ordered    03/06/24 1408  Specimen to Pathology, Surgery Breast  Once        Comments: Pre-op Diagnosis: Malignant neoplasm of upper-inner quadrant of left female breast, unspecified estrogen receptor status [C50.212]Procedure(s):LUMPECTOMY,BREAST,WITH RADIOACTIVE SEED LOCALIZATION / RADIOLOGICAL MARKERBIOPSY, LYMPH NODE, SENTINELINJECTION, FOR SENTINEL NODE IDENTIFICATIONMAMMOPLASTY, REDUCTION, BILATERAL / ONCOPLASTIC REDUCTION Number of specimens: 9Name of specimens: 1. Left lumpectomy, green inferior, blue superior, orange lateral, yellow anterior, black posterior, red medial 2. Left axillary sentinel lymph node, hot 2209 (frozen)3. Left axillary sentinel lymph node, hot 528 (frozen)4. Left axillary palpable node (frozen)5. Left axillary sentinel lymph node, hot 653 (frozen)6. Left breast, new inferior margin, ink  mg new margin7. Left breast, new medial margin, ink marks new margin8. Left breast reduction9. Right breast reduction     References:    Click here for ordering Quick Tip   Question Answer Comment   Procedure Type: Breast    Specimen Class: Known or suspected malignancy    Which provider would you like to cc? PADILLA PERDUE    Which provider would you like to cc? PRANEETH ARANA    Release to patient Immediate        03/06/24 1413                    [id number]

## 2024-03-06 NOTE — BRIEF OP NOTE
[facility] Surgery ([facility]  Brief Operative Note    SUMMARY     Surgery Date: 3/6/2024     Surgeon(s) and Role:  Panel 1:     * Keke Sequeira MD - Primary  Panel 2:     * Demond Patel DO - Primary    Assisting Surgeon: None    Pre-op Diagnosis:  Malignant neoplasm of upper-inner quadrant of left female breast, unspecified estrogen receptor status [C50.212]    Post-op Diagnosis:  Post-Op Diagnosis Codes:     * Malignant neoplasm of upper-inner quadrant of left female breast, unspecified estrogen receptor status [C50.212]    Procedure(s) (LRB):  LUMPECTOMY,BREAST,WITH RADIOACTIVE SEED LOCALIZATION / RADIOLOGICAL MARKER (Left)  BIOPSY, LYMPH NODE, SENTINEL (Left)  INJECTION, FOR SENTINEL NODE IDENTIFICATION (Left)  MAMMOPLASTY, REDUCTION, BILATERAL / ONCOPLASTIC REDUCTION (Bilateral)    Anesthesia: General    Implants:  * No implants in log *    Operative Findings: left lumpectomy and SLNBx with oncologic reduction and symetrizing contralateral reduction/mastopexy    Estimated Blood Loss: minimal    Estimated Blood Loss has been documented.         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 Left lumpectomy, green inferior, blue superior, orange lateral, yellow anterior, black posterior, red medial 2. Left axillary sentinel lymph node, hot 2209 (frozen)3. Left axillary sentinel lymph node, hot 528 (frozen)4. Left axillary palpable node (frozen)5. Left axillary sentinel lymph node, hot 653 (frozen)6. Left breast, new inferior  margin, ink marks new margin7. Left breast, new medial margin, ink marks new margin8. Left breast reduction9. Right breast reduction     References:    Click here for ordering Quick Tip   Question Answer Comment   Procedure Type: Breast    Specimen Class: Known or suspected malignancy    Which provider would you like to cc? PADILLA PERDUE    Which provider would you like to cc? PRANEETH ARANA    Release to patient Immediate        03/06/24 1413                    [id number]

## 2024-03-06 NOTE — OR NURSING
VSS on RA. Pain is tolerable per pt. Bilateral breast incision, CDI. X2 RA drains intact, sanguinous output. PIV CDI. Meets PACU discharge criteria. Ready for transfer to phase II. Family notified.

## 2024-03-06 NOTE — DISCHARGE INSTRUCTIONS
Plastic Surgery Discharge Instructions  Vargas Patel, DO    Wound Care  1. Shower daily beginning 2 days after surgery  2. Okay to let warm soapy water run over the wound at that time  3. Do not submerge wounds in the bath  4. Leave any skin glue or mesh tape in place    Drain Care  If you have drains, strip tubing and record output when drain bulb becomes half full, or at least twice daily  Record output for each drain and bring to our follow up appointment    Diet  Regular Diet    Activity  Light activity only - no lifting greater than 10 lbs  Avoid strenuous activity that would cause you to get hot or sweaty    Medications  You have been given a prescription for narcotic pain medication, muscle relaxer, and nerve pain  Unless otherwise contraindicated by your doctor, take 2 extra strength Tylenol and 600mg of ibuprofen every 8 hours  Please take medications as prescribed     What to call for:  1. Sustained fever > 101.0  2. Changes in color, sensation, temperature or pain at surgical site  3. Redness and/or drainage from the surgical site  4. Any reaction to prescribed medications  5. Continuous bloody drainage from surgical drains  6. Wound vac malfunction  7. Questions related to the procedure    Follow-up  1. Please call [phone] to schedule or confirm your follow up visit at the [facility] [place] on [facility]  2. Call with any questions or concerns.  2. After hours call [phone] - ask to speak with the Plastic Surgery fellow on call      Your Surgeon Gave You EXPAREL® (bupivacaine liposome injectable suspension)    To help control your pain after surgery, your surgeon injected EXPAREL into your surgical incision just before the end of the procedure.   EXPAREL is a local analgesic that contains the local anesthetic bupivacaine. Local anesthetics provide pain relief by numbing the tissue  around the surgical site.   EXPAREL is specifically designed to release pain medication over time and  can control pain for up to 72 hours.   In addition to EXPAREL, your surgeon may provide other pain medications to control your pain.   Each patient is different and responds differently to painmedication. Depending on how you respond to EXPAREL, you may require less  additional pain medication during your recovery.    Possible Side Effects    Side effects can occur with any medication and it is important not to ignore anything you may be experiencing. Some patients who  received EXPAREL experienced nausea, vomiting, or constipation. Rarely, patients who receive bupivacaine (the active ingredient in  EXPAREL) have experienced numbness and tingling in their mouth or lips, lightheadedness, or anxiety. Speak with your doctor right  away if you think you may be experiencing any of these sensations, or if you have other questions regarding possible side effects.    Your Recovery    When your pain is under control, your body can better focus on healing. This is not the time to test your pain tolerance, or grin and  bear it.Work with your surgeon and nurse to make your recovery as speedy and pain-free as possible.   Follow the post-op orders your nurse gave you.   Eat a healthy diet and drink plenty of water. Surgery stresses your body; your body responds by needing more energy to heal.      Important Information About EXPAREL  Products that contain bupivacaine, like EXPAREL, may cause a temporary loss of  sensation or the ability to move in the area where bupivacaine was injected.    Date Administered: ***  Time Administered: ***    Other Forms of Bupivicaine should not be administered within 96hrs following administration of EXPAREL.

## 2024-03-07 VITALS
DIASTOLIC BLOOD PRESSURE: 73 MMHG | BODY MASS INDEX: 29.73 KG/M2 | TEMPERATURE: 98 F | OXYGEN SATURATION: 97 % | HEART RATE: 77 BPM | RESPIRATION RATE: 16 BRPM | SYSTOLIC BLOOD PRESSURE: 113 MMHG | WEIGHT: 185 LBS | HEIGHT: 66 IN

## 2024-03-07 NOTE — PLAN OF CARE
Teresa Valdes has met all discharge criteria from Phase II. Vital Signs are stable, ambulating  without difficulty. Discharge instructions given, patient verbalized understanding. Discharged from facility via wheelchair in stable condition.

## 2024-03-12 ENCOUNTER — OFFICE VISIT (OUTPATIENT)
Dept: PLASTIC SURGERY | Facility: CLINIC | Age: 54
End: 2024-03-12
Payer: COMMERCIAL

## 2024-03-12 VITALS
BODY MASS INDEX: 29.72 KG/M2 | WEIGHT: 184.94 LBS | HEART RATE: 77 BPM | RESPIRATION RATE: 19 BRPM | SYSTOLIC BLOOD PRESSURE: 113 MMHG | HEIGHT: 66 IN | DIASTOLIC BLOOD PRESSURE: 73 MMHG

## 2024-03-12 DIAGNOSIS — C50.912 INFILTRATING DUCTAL CARCINOMA OF LEFT BREAST: Primary | ICD-10-CM

## 2024-03-12 PROCEDURE — 3078F DIAST BP <80 MM HG: CPT | Mod: CPTII,S$GLB,, | Performed by: SURGERY

## 2024-03-12 PROCEDURE — 1159F MED LIST DOCD IN RCRD: CPT | Mod: CPTII,S$GLB,, | Performed by: SURGERY

## 2024-03-12 PROCEDURE — 3074F SYST BP LT 130 MM HG: CPT | Mod: CPTII,S$GLB,, | Performed by: SURGERY

## 2024-03-12 PROCEDURE — 99024 POSTOP FOLLOW-UP VISIT: CPT | Mod: S$GLB,,, | Performed by: SURGERY

## 2024-03-12 PROCEDURE — 99999 PR PBB SHADOW E&M-EST. PATIENT-LVL III: CPT | Mod: PBBFAC,,, | Performed by: SURGERY

## 2024-03-13 NOTE — PROGRESS NOTES
Teresa Salazar Keisha femalepresents to Plastic Surgery Clinic for a follow up visit status post oncoplastic breast reduction on 3/6/24. Patient is doing well today with no issues since surgery. Happy with her improved shape, pathology not yet resulted      PHYSICAL EXAMINATION  Bilateral breast incision(s) well approximated with no issues. Some resolving edema and mild ecchymosis, more on left than right   Nipples are viable with hypersensitivity and good position.  Drains are in place with low output       ASSESSMENT/PLAN  Teresa Valdes is s/p oncoplastic breast reduction  - Discussed breast shape is swollen with a high nipple position, which is intentional to allow settling over time. The final appearance of the breasts will be different in 3-6 months post operatively.   - Areolar sutures and steri strips removed. Advised to apply bacitracin to areolar incisions daily x 1 week.   - RA drain(s) removed  - Follow up 2 weeks for prineo tape removal      All questions were answered. The patient was advised to contact the clinic with any questions or concerns prior to their next visit.       Demond Patel  Plastic and Reconstructive Surgery

## 2024-03-14 ENCOUNTER — PATIENT MESSAGE (OUTPATIENT)
Dept: PLASTIC SURGERY | Facility: CLINIC | Age: 54
End: 2024-03-14
Payer: COMMERCIAL

## 2024-03-14 RX ORDER — GABAPENTIN 300 MG/1
300 CAPSULE ORAL 3 TIMES DAILY
Qty: 15 CAPSULE | Refills: 0 | Status: SHIPPED | OUTPATIENT
Start: 2024-03-14 | End: 2024-05-01 | Stop reason: SDUPTHER

## 2024-03-19 ENCOUNTER — OFFICE VISIT (OUTPATIENT)
Dept: SURGERY | Facility: CLINIC | Age: 54
End: 2024-03-19
Payer: COMMERCIAL

## 2024-03-19 VITALS
HEART RATE: 78 BPM | BODY MASS INDEX: 29.72 KG/M2 | WEIGHT: 184.94 LBS | SYSTOLIC BLOOD PRESSURE: 129 MMHG | HEIGHT: 66 IN | DIASTOLIC BLOOD PRESSURE: 91 MMHG

## 2024-03-19 DIAGNOSIS — Z12.31 SCREENING MAMMOGRAM, ENCOUNTER FOR: Primary | ICD-10-CM

## 2024-03-19 DIAGNOSIS — C50.912 INFILTRATING DUCTAL CARCINOMA OF LEFT BREAST: ICD-10-CM

## 2024-03-19 PROCEDURE — 99024 POSTOP FOLLOW-UP VISIT: CPT | Mod: S$GLB,,, | Performed by: SURGERY

## 2024-03-19 PROCEDURE — 3080F DIAST BP >= 90 MM HG: CPT | Mod: CPTII,S$GLB,, | Performed by: SURGERY

## 2024-03-19 PROCEDURE — 3074F SYST BP LT 130 MM HG: CPT | Mod: CPTII,S$GLB,, | Performed by: SURGERY

## 2024-03-19 PROCEDURE — 99999 PR PBB SHADOW E&M-EST. PATIENT-LVL III: CPT | Mod: PBBFAC,,, | Performed by: SURGERY

## 2024-03-19 NOTE — BRIEF OP NOTE
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 breast, unspecified estrogen receptor status [C50.212]    Post-op Diagnosis:  Post-Op Diagnosis Codes:     * Malignant neoplasm of upper-inner quadrant of left female breast, unspecified estrogen receptor status [C50.212]    Procedure(s) (LRB):  LUMPECTOMY,BREAST,WITH RADIOACTIVE SEED LOCALIZATION / RADIOLOGICAL MARKER (Left)  BIOPSY, LYMPH NODE, SENTINEL (Left)  INJECTION, FOR SENTINEL NODE IDENTIFICATION (Left)  MAMMOPLASTY, REDUCTION, BILATERAL / ONCOPLASTIC REDUCTION (Bilateral)    Anesthesia: General    Operative Findings: left lumpectomy and SLNBx with oncologic reduction    Estimated Blood Loss: * No values recorded between 3/6/2024 11:56 AM and 3/6/2024  3:37 PM *         Specimens:   Specimen (24h ago, onward)      None              Discharge Note    OUTCOME: Patient tolerated treatment/procedure well without complication and is now ready for discharge.    DISPOSITION: Home or Self Care    FINAL DIAGNOSIS:  Malignant neoplasm of upper-inner quadrant of left female breast, unspecified estrogen receptor status [C50.212]    FOLLOWUP: In clinic    DISCHARGE INSTRUCTIONS:    Discharge Procedure Orders   Diet Adult Regular     Notify your health care provider if you experience any of the following:  increased confusion or weakness     Notify your health care provider if you experience any of the following:  persistent dizziness, light-headedness, or visual disturbances     Notify your health care provider if you experience any of the following:  worsening rash     Notify your health care provider if you experience any of the following:  severe persistent headache     Notify your health care provider if you  experience any of the following:  difficulty breathing or increased cough     Notify your health care provider if you experience any of the following:  severe uncontrolled pain     Notify your health care provider if you experience any of the following:  persistent nausea and vomiting or diarrhea     Notify your health care provider if you experience any of the following:  redness, tenderness, or signs of infection (pain, swelling, redness, odor or green/yellow discharge around incision site)     Notify your health care provider if you experience any of the following:  temperature >100.4     Leave dressing on - Keep it clean, dry, and intact until clinic visit     Weight bearing restrictions (specify):   Order Comments: No heavy liftinh with arms bilaterally (greater then 5lbs)

## 2024-03-19 NOTE — PROGRESS NOTES
[facility]       Post-Op        REFERRING PHYSICIAN:  No referring provider defined for this encounter.       Maryse Abreu MD    MEDICAL ONCOLOGIST:    Dr. Johnson  RADIATION ONCOLOGIST:   pending    DIAGNOSIS:    This is a 53 y.o. female with a stage pT1 N0 M0 grade 3 ER - VT - HER2 - IDC of the left breast.    TREATMENT SUMMARY:  The patient is status post neoadjuvant chemo completed 1/30/24. She is now s/p left partial mastectomy and sentinel node biopsy with oncoplastic reduction with Dr. Patel on 3/6/2024.  Final pathology is pending.    INTERVAL HISTORY:   Teresa Valdes comes in for a post-op check.  She denies fever, chills, chest pain or shortness of breath.  Her pain is well controlled.  She has had some mild drainage from near the left nipple which she dresses daily with gauze. She also has increased swelling of the left breast and axillary area compared to the right side which is tender. Drains are all removed.    MEDICATIONS:  Current Outpatient Medications   Medication Sig Dispense Refill    diclofenac (VOLTAREN) 50 MG EC tablet Take 1 tablet (50 mg total) by mouth 3 (three) times daily as needed (pain). 30 tablet 1    ergocalciferol (ERGOCALCIFEROL) 50,000 unit Cap Take 1 capsule (50,000 Units total) by mouth every 7 days. (Patient not taking: Reported on 4/4/2024) 12 capsule 3    gabapentin (NEURONTIN) 300 MG capsule Take 1 capsule (300 mg total) by mouth 3 (three) times daily. 270 capsule 3    multivitamin capsule Take 1 capsule by mouth once daily.      oxyCODONE (ROXICODONE) 5 MG immediate release tablet Take 1 tablet (5 mg total) by mouth every 4 (four) hours as needed for Pain. 10 tablet 0    venlafaxine (EFFEXOR) 37.5 MG Tab Take 1 tablet (37.5 mg total) by mouth once daily. 90 tablet 3     No current facility-administered medications for this visit.       ALLERGIES:   Review of patient's allergies indicates:  No Known Allergies    PHYSICAL EXAMINATION:   General:   This is a well appearing female with appropriate speech, affect and gait.     Breast:  Incision clean, dry, and intact. Significant edema of the left breast and axilla worse than right.    IMPRESSION:   The patient has had an uneventful postoperative course.    PLAN:   1. return in 6 months for a follow up office visit and breast exam  2. bilateral mammogram in 6 months after radiation treatment  3. The patient is advised in continued exam of the breast chest wall and to report to this office sooner should she note any areas of abnormality or concern.   4.  She has been instructed to meet with med onc and rad onc for discussion of adjuvant treatment recommendations  5. Will follow up pathology results to determine adjuvant treatment recommendations

## 2024-03-21 ENCOUNTER — OFFICE VISIT (OUTPATIENT)
Dept: HEMATOLOGY/ONCOLOGY | Facility: CLINIC | Age: 54
End: 2024-03-21
Payer: COMMERCIAL

## 2024-03-21 VITALS
OXYGEN SATURATION: 99 % | HEART RATE: 80 BPM | DIASTOLIC BLOOD PRESSURE: 85 MMHG | TEMPERATURE: 98 F | BODY MASS INDEX: 29.77 KG/M2 | SYSTOLIC BLOOD PRESSURE: 146 MMHG | WEIGHT: 184.44 LBS

## 2024-03-21 DIAGNOSIS — C50.212 MALIGNANT NEOPLASM OF UPPER-INNER QUADRANT OF LEFT BREAST IN FEMALE, ESTROGEN RECEPTOR NEGATIVE: Primary | ICD-10-CM

## 2024-03-21 DIAGNOSIS — R53.83 FATIGUE, UNSPECIFIED TYPE: ICD-10-CM

## 2024-03-21 DIAGNOSIS — Z17.1 MALIGNANT NEOPLASM OF UPPER-INNER QUADRANT OF LEFT BREAST IN FEMALE, ESTROGEN RECEPTOR NEGATIVE: Primary | ICD-10-CM

## 2024-03-21 DIAGNOSIS — G62.9 NEUROPATHY: ICD-10-CM

## 2024-03-21 PROCEDURE — 3077F SYST BP >= 140 MM HG: CPT | Mod: CPTII,S$GLB,, | Performed by: STUDENT IN AN ORGANIZED HEALTH CARE EDUCATION/TRAINING PROGRAM

## 2024-03-21 PROCEDURE — 99214 OFFICE O/P EST MOD 30 MIN: CPT | Mod: S$GLB,,, | Performed by: STUDENT IN AN ORGANIZED HEALTH CARE EDUCATION/TRAINING PROGRAM

## 2024-03-21 PROCEDURE — 3008F BODY MASS INDEX DOCD: CPT | Mod: CPTII,S$GLB,, | Performed by: STUDENT IN AN ORGANIZED HEALTH CARE EDUCATION/TRAINING PROGRAM

## 2024-03-21 PROCEDURE — 3079F DIAST BP 80-89 MM HG: CPT | Mod: CPTII,S$GLB,, | Performed by: STUDENT IN AN ORGANIZED HEALTH CARE EDUCATION/TRAINING PROGRAM

## 2024-03-21 PROCEDURE — 99999 PR PBB SHADOW E&M-EST. PATIENT-LVL II: CPT | Mod: PBBFAC,,, | Performed by: STUDENT IN AN ORGANIZED HEALTH CARE EDUCATION/TRAINING PROGRAM

## 2024-03-21 NOTE — PROGRESS NOTES
[facility]/ [facility]      Chief Complaint:   TNBC     Cancer Staging   Invasive ductal carcinoma of breast  Staging form: Breast, AJCC 8th Edition  - Clinical stage from 2023: Stage IB (cT1c, cN0, cM0, G3, ER-, SC-, HER2-) - Signed by Sonia Johnson MD on 2023    HPI:  Teresa Valdes is a 53 y.o. female who presents today for evaluation of newly diagnosed breast cancer. Her oncologic history is as follows:    -screening MMG 23 showing an asymmetry in the central L breast. Diagnostic MMG/US revealing a 19 x 12 x17mm complex cystic and solid mass seen in the L breast at 11oclock. No left axillary LAD   -23 biopsy confirming IDC, grade 3. ER negative, SC negative, Her2 2+, negative FISH. Ki67 80%  -23 MRI breast showed 1.6 x 1.0 x 1.2 cm left breast mass with no associated lymphadenopathy.  -2023 started na ddAC-T; completed end of 2024  -s/p surgery on 3/6/24, pathology pending    Gyn History:   Menarche: 14  Menopause: 46    Age at first pregnancy: 18  : Yes, with second child    Interval History:  Teresa presents today for follow up. She is s/p surgery on 3/6/24- final pathology is still pending at today's visit. She continues to heal fairly well from surgery. Denies new complaints today otherwise.         Patient Active Problem List   Diagnosis    Encounter for colorectal cancer screening    Anxiety and depression    Overweight (BMI 25.0-29.9)    Need for hepatitis C screening test    Need for shingles vaccine    Bunion of great toe of left foot    Laceration of skin of forehead    Invasive ductal carcinoma of breast    Malignant neoplasm of upper-inner quadrant of left breast in female, estrogen receptor negative       Current Outpatient Medications   Medication Instructions    ergocalciferol (ERGOCALCIFEROL) 50,000 Units, Oral, Every 7 days    gabapentin (NEURONTIN) 300 mg, Oral, 3 times daily     multivitamin capsule 1 capsule, Oral, Daily    oxyCODONE (ROXICODONE) 5 mg, Oral, Every 4 hours PRN    venlafaxine (EFFEXOR) 37.5 mg, Oral, Daily       Review of Systems:   +fatigue  12pt ROS negative except as above     PHYSICAL EXAM:  ECOG 0   General: well appearing, in no apparent distress  HEENT: Normocephalic, EOMI, anicteric sclerae, MMM  Neck: supple, without cervical or supraclavicular lymphadenopathy.  Heart: regular rate and rhythm, normal S1 and S2, no murmurs, gallops or rubs.  Lungs: Clear to auscultation bilaterally, no increased wob  Breast: s/p L breast lumpectomy and bilateral reduction, incisions healing well    Abdomen: Soft, nontender, nondistended with normal bowel sounds. No hepatosplenomegaly.  Extremities: No LE edema or joint effusion  Skin: warm, well-perfused, no rash  Neurologic: Alert and oriented x 4, normal speech and gait   Psychiatric: Conversing appropriately with providers throughout today's encounter.      Reviewed all recent labs, imaging and pathology.    Assessment & Plan:  Teresa is a dimitry 52yo woman with recently diagnosed cT1cN0 TNBC.     We previously reviewed the natural history of TNBC; given disease <2cm would favor proceeding with naddAC-T. Previously discussed dosing, logistic, supportive medications and potential side effects.    She has since completed neoadjuvant treatment and is s/p bilateral mastectomies on 3/6/24. Final pathology was still pending at today's visit. We reviewed options for adjuvant treatment including capecitabine BID 14 days on and 7 days off for 6-8 cycles should she have residual disease (per [employer] data) vs observation if pCR. Will have her RTC in one week for review path.          #TNBC:  --pathology pending as above. RTC In 1 week  --echo 12/18/23 with stable EF 55-60%       #Bone pain: 2/2 growth factor. Resolved.   --encouraged claritin, NSAIDS prn and heating pad      #Fatigue: chemotherapy-induced, suspect due to cumulative  treatment effect and now surgical recovery  --encouraged her to push po fluids, optimize nutrition    #Neuropathy: bilateral fingertips and now toes. Getting better   --not interested in acupuncture at this time  --will cont to monitor this closely       All questions were answered to her apparent satisfaction. Will see her back in 1 week or sooner should the need arise.     Sonia Johnson MD       Route Chart for Scheduling    Med Onc Chart Routing      Follow up with physician 1 week.   Follow up with DARIANA    Infusion scheduling note    Injection scheduling note    Labs None   Scheduling:  Preferred lab:  Lab interval:     Imaging None      Pharmacy appointment No pharmacy appointment needed      Other referrals no referral to Oncology Primary Care needed -  no Massage appointment needed    No additional referrals needed                   MDM includes  :    - Acute or chronic illness or injury that poses a threat to life or bodily function  - Review of prior external notes from unique source  - Independent review and explanation of 3+ results from unique tests  - Discussion of management and ordering 3+ unique tests  - Extensive discussion of treatment and management  - Prescription drug management  - Drug therapy requiring intensive monitoring for toxicity

## 2024-03-25 ENCOUNTER — PATIENT MESSAGE (OUTPATIENT)
Dept: SURGERY | Facility: CLINIC | Age: 54
End: 2024-03-25
Payer: COMMERCIAL

## 2024-03-25 LAB
COMMENT: NORMAL
FINAL PATHOLOGIC DIAGNOSIS: NORMAL
FROZEN SECTION DIAGNOSIS: NORMAL
FROZEN SECTION FOOTNOTE: NORMAL
GROSS: NORMAL
Lab: NORMAL
MICROSCOPIC EXAM: NORMAL

## 2024-03-26 ENCOUNTER — OFFICE VISIT (OUTPATIENT)
Dept: PLASTIC SURGERY | Facility: CLINIC | Age: 54
End: 2024-03-26
Payer: COMMERCIAL

## 2024-03-26 VITALS
HEIGHT: 66 IN | DIASTOLIC BLOOD PRESSURE: 75 MMHG | WEIGHT: 184.06 LBS | OXYGEN SATURATION: 98 % | RESPIRATION RATE: 19 BRPM | BODY MASS INDEX: 29.58 KG/M2 | SYSTOLIC BLOOD PRESSURE: 131 MMHG | HEART RATE: 82 BPM

## 2024-03-26 DIAGNOSIS — Z90.13 STATUS POST BILATERAL MASTECTOMY: Primary | ICD-10-CM

## 2024-03-26 DIAGNOSIS — C50.912 INFILTRATING DUCTAL CARCINOMA OF LEFT BREAST: ICD-10-CM

## 2024-03-26 PROCEDURE — 3078F DIAST BP <80 MM HG: CPT | Mod: CPTII,S$GLB,, | Performed by: SURGERY

## 2024-03-26 PROCEDURE — 3075F SYST BP GE 130 - 139MM HG: CPT | Mod: CPTII,S$GLB,, | Performed by: SURGERY

## 2024-03-26 PROCEDURE — 99024 POSTOP FOLLOW-UP VISIT: CPT | Mod: S$GLB,,, | Performed by: SURGERY

## 2024-03-26 PROCEDURE — 1159F MED LIST DOCD IN RCRD: CPT | Mod: CPTII,S$GLB,, | Performed by: SURGERY

## 2024-03-26 PROCEDURE — 99999 PR PBB SHADOW E&M-EST. PATIENT-LVL III: CPT | Mod: PBBFAC,,, | Performed by: SURGERY

## 2024-03-26 NOTE — PROGRESS NOTES
Teresa Valdes femalepresents to Plastic Surgery Clinic for a follow up visit status post oncoplastic breast reduction on 3/6/24. She's in the process of moving and had some right shoulder pain that she went to the ED for. States she was diagnosed with arthritis.    PHYSICAL EXAMINATION  Bilateral breast incision(s) healing well with a small area of superficial breakdown on the left t point/mastectomy flap where prineo tape was removed.  Nipples are viable with normal sensation and good position.      ASSESSMENT/PLAN  Teresa Valdes is s/p oncoplastic breast reduction  - Counseled on incision care - moisturize with vitamin E oil or scar creams such as Mederma. Can also use silicone scar tape along incision.  - Scar massage reviewed  - Follow up 3 months      All questions were answered. The patient was advised to contact the clinic with any questions or concerns prior to their next visit.       Courtney Browning PA-C  Plastic and Reconstructive Surgery

## 2024-03-27 ENCOUNTER — PATIENT MESSAGE (OUTPATIENT)
Dept: HEMATOLOGY/ONCOLOGY | Facility: CLINIC | Age: 54
End: 2024-03-27
Payer: COMMERCIAL

## 2024-03-28 NOTE — PROGRESS NOTES
I spoke with Teresa this afternoon to review her final path demonstrating pCR. Next steps will be adjuvant radiation and close observation. She was in agreement with this plan. Scheduled for follow up with me on 4/30.     Sonia Johnson MD

## 2024-04-02 NOTE — PROGRESS NOTES
REFERRING PHYSICIAN:   Keke Sequeira MD, Sonia Johnson MD    DIAGNOSIS:  [id number] (ypT0N0) invasive ductal carcinoma of the left breast    HISTORY OF PRESENT ILLNESS:   Ms. Valdes is a 53-year-old female who was recently diagnosed with left breast cancer after an abnormal mammogram in April 2023 which revealed a suspicious lesion in the upper inner quadrant of the left breast.  A diagnostic mammogram in June 2023 revealed a 19 x 12 x 17 mm lesion in the 11 o'clock position of the left breast.  A core needle biopsy of this lesion on July 24, 2023 revealed grade 3, invasive ductal carcinoma, which was ER/TX negative, and HER2 negative, with Ki-67 index of 80%.  An MRI of the bilateral breasts on July 31, 2023 revealed a 1.6 x 1.0 x 1.2 cm irregular mass in the left breast without enlarged axillary or internal mammary lymph nodes.  She received neoadjuvant chemotherapy with AC followed by Taxol.  A repeat MRI of the bilateral breasts on February 5, 2024 revealed complete resolution of the left breast mass.  She underwent left breast lumpectomy and axillary lymph node sampling as well as bilateral reduction mammoplasty on March 6, 2024.  Pathology revealed residual 3 mm focus of grade 2 DCIS, without invasive component.  All margins are widely negative for involvement.  3/3 sentinel lymph nodes and 1 additional lymph node from the left axilla are negative for involvement.  She is here today for discussion regarding further treatment.    At present, patient is healing from the surgery without any unexpected side effects.  She reports tenderness of the left breast and left arm since surgery.  She denies left breast, edema, erythema, or nipple discharge.  She also denies fever, night sweats, or weight loss.  She has history of smoking less than 1 pack a day for 20 years which she quit in January 2024.    REVIEW OF SYSTEMS:  As above.  In addition, patient denies headaches, visual problems, dizziness, chest pain, shortness of  breath, cough, nausea, vomiting, diarrhea, or any new bony pains. Patient also denies easy bruising, skin rashes, or numbness or tingling.    GYN HISTORY:   Menarche at age 14.  .  Menopause at age 46.     ECO    PAST MEDICAL HISTORY:  Past Medical History:   Diagnosis Date    Anxiety     Depression     Hypertension     resolved    Ovarian cancer     Sebaceous cyst 2018       PAST SURGICAL HISTORY:  Past Surgical History:   Procedure Laterality Date    ADENOIDECTOMY      BREAST CYST EXCISION Right     BREAST SURGERY      Abscess     CHOLECYSTECTOMY      COLONOSCOPY N/A 2023    Procedure: COLONOSCOPY;  Surgeon: Mona Valverde MD;  Location: [facility];  Service: Endoscopy;  Laterality: N/A;    CYST REMOVAL      on back    cyst removed left wrist      INJECTION FOR SENTINEL NODE IDENTIFICATION Left 3/6/2024    Procedure: INJECTION, FOR SENTINEL NODE IDENTIFICATION;  Surgeon: Keke Sequeira MD;  Location: [facility];  Service: General;  Laterality: Left;    KNEE LIGAMENT RECONSTRUCTION Left     LUMPECTOMY,BREAST,WITH RADIOACTIVE SEED LOCALIZATION Left 3/6/2024    Procedure: LUMPECTOMY,BREAST,WITH RADIOACTIVE SEED LOCALIZATION / RADIOLOGICAL MARKER;  Surgeon: Keke Sequeira MD;  Location: [facility];  Service: General;  Laterality: Left;  1.5 HOURS    PORTACATH PLACEMENT      right chest wall    REDUCTION OF BOTH BREASTS Bilateral 3/6/2024    Procedure: MAMMOPLASTY, REDUCTION, BILATERAL / ONCOPLASTIC REDUCTION;  Surgeon: Demond Patel DO;  Location: [facility];  Service: General;  Laterality: Bilateral;  3 HOURS ON PAPER    SENTINEL LYMPH NODE BIOPSY Left 3/6/2024    Procedure: BIOPSY, LYMPH NODE, SENTINEL;  Surgeon: Keke Sequeira MD;  Location: [facility];  Service: General;  Laterality: Left;    sleeve gastroectomy  2017    in [place]    TONSILLECTOMY      Uvuloplasty         ALLERGIES:   Review of patient's allergies indicates:  No Known Allergies    MEDICATIONS:  Current Outpatient  Medications   Medication Sig    diclofenac (VOLTAREN) 50 MG EC tablet Take 1 tablet (50 mg total) by mouth 3 (three) times daily as needed (pain).    ergocalciferol (ERGOCALCIFEROL) 50,000 unit Cap Take 1 capsule (50,000 Units total) by mouth every 7 days.    gabapentin (NEURONTIN) 300 MG capsule Take 1 capsule (300 mg total) by mouth 3 (three) times daily. for 5 days    multivitamin capsule Take 1 capsule by mouth once daily.    oxyCODONE (ROXICODONE) 5 MG immediate release tablet Take 1 tablet (5 mg total) by mouth every 4 (four) hours as needed for Pain.    venlafaxine (EFFEXOR) 37.5 MG Tab Take 1 tablet (37.5 mg total) by mouth once daily.     No current facility-administered medications for this visit.       SOCIAL HISTORY:  Social History     Socioeconomic History    Marital status:    Occupational History     Employer: [employer]   Tobacco Use    Smoking status: Former     Current packs/day: 0.50     Average packs/day: 0.5 packs/day for 7.1 years (3.5 ttl pk-yrs)     Types: Cigarettes     Start date: 3/7/2017    Smokeless tobacco: Never    Tobacco comments:     Working on quitting    Substance and Sexual Activity    Alcohol use: Yes     Alcohol/week: 2.0 standard drinks of alcohol     Types: 2 Glasses of wine per week     Comment: occ    Drug use: Never    Sexual activity: Yes     Partners: Male     Birth control/protection: Partner-Vasectomy     Comment:    Social History Narrative    She teaches Band at middle school. Lives with her  and 2 children in [place]. She has an older daughter who she gave up for adoption. Exercising.      Social Determinants of Health     Financial Resource Strain: Medium Risk (1/10/2024)    Overall Financial Resource Strain (CARDIA)     Difficulty of Paying Living Expenses: Somewhat hard   Food Insecurity: No Food Insecurity (1/10/2024)    Hunger Vital Sign     Worried About Running Out of Food in the Last Year: Never  "true     Ran Out of Food in the Last Year: Never true   Transportation Needs: No Transportation Needs (1/10/2024)    PRAPARE - Transportation     Lack of Transportation (Medical): No     Lack of Transportation (Non-Medical): No   Physical Activity: Insufficiently Active (1/10/2024)    Exercise Vital Sign     Days of Exercise per Week: 2 days     Minutes of Exercise per Session: 10 min   Stress: Stress Concern Present (1/10/2024)    [place] [employer] [place] [employer] [employer] - Occupational Stress Questionnaire     Feeling of Stress : To some extent   Social Connections: Unknown (1/10/2024)    Social Connection and Isolation Panel [NHANES]     Frequency of Communication with Friends and Family: More than three times a week     Frequency of Social Gatherings with Friends and Family: Twice a week     Active Member of Clubs or Organizations: Yes     Attends Club or Organization Meetings: 1 to 4 times per year     Marital Status:    Housing Stability: Low Risk  (1/10/2024)    Housing Stability Vital Sign     Unable to Pay for Housing in the Last Year: No     Number of Places Lived in the Last Year: 2     Unstable Housing in the Last Year: No       FAMILY HISTORY:  Family History   Problem Relation Age of Onset    Arthritis Mother     Hypertension Father     Heart disease Father     Breast cancer Paternal Grandmother     Cancer Neg Hx          PHYSICAL EXAMINATION:  Vitals:    04/04/24 1410   BP: (!) 169/107   BP Location: Right arm   Patient Position: Sitting   BP Method: Medium (Automatic)   Pulse: 73   SpO2: 98%   Weight: 82.3 kg (181 lb 7 oz)   Height: 5' 6" (1.676 m)   Body mass index is 29.28 kg/m².   GENERAL: Patient is alert and oriented, in no acute distress.  HEENT:Extraocular muscles are intact.  Oropharynx is clear without lesions.  There is no cervical or supraclavicular lymphadenopathy palpated.  No thyromegaly noted.  HEART: Regular rate and rhythm.  LUNGS: Clear to auscultation bilaterally.  BREAST " EXAM:  The left breast is slightly smaller than the right.  Bilateral breasts noted with scar secondary to reduction mammoplasty.  Scar on the left breast with eschar inferiorly with mild surrounding erythema.  No signs of infection.  No abnormal masses palpated in the bilateral breasts or bilateral axilla.  ABDOMEN:Soft, nontender, nondistended, without hepatosplenomegaly.  Normoactive bowel sounds.  EXTREMITIES: No clubbing, cyanosis, or edema.  NEUROLOGICAL: Cranial nerve II through XII grossly intact.  Sensation is intact.  Strength is 5 out of 5 in the upper and lower extremities bilaterally.     ASSESSMENT:   This is a 53-year-old female with cT1c N0 M0 (ypT0N0) grade 3, invasive ductal carcinoma of the left breast (upper inner quadrant) who underwent neoadjuvant chemotherapy followed by left breast lumpectomy and sentinel node biopsy as well as bilateral reduction mammoplasty on March 6, 2024 with residual 3 mm, grade 2, DCIS with widely negative margins, ER/NV negative, HER2 negative, Ki-67 index 80%.    PLAN:   After review of the pathology and images of the radiological studies, Ms. Valdes is noted to have good response to neoadjuvant chemotherapy with 3 mm of residual grade 2 DCIS at the time of lumpectomy.  All of her lymph nodes were negative at the time of diagnosis.  To reduce the chance of local recurrence, she is recommended to undergo postoperative radiation to the left breast.  I plan to deliver approximately 4200 cGy.     The risks, benefits, and side effects of radiation were explained in detail to the patient.  All questions were answered and informed consent was signed.  I plan to see the patient back for radiation planning CT in approximately 2 weeks.  She was instructed to call if she develops skin erythema or discharge from the left breast surgical site.    Psychosocial Distress screening score of Distress Score: 4 noted and reviewed. No intervention indicated.     I spent approximately 60  minutes reviewing the available records and evaluating the patient, out of which over 50% of the time was spent face to face with the patient in counseling and coordinating this patient's care.

## 2024-04-04 ENCOUNTER — OFFICE VISIT (OUTPATIENT)
Dept: RADIATION ONCOLOGY | Facility: CLINIC | Age: 54
End: 2024-04-04
Payer: COMMERCIAL

## 2024-04-04 VITALS
HEART RATE: 73 BPM | WEIGHT: 181.44 LBS | DIASTOLIC BLOOD PRESSURE: 107 MMHG | OXYGEN SATURATION: 98 % | SYSTOLIC BLOOD PRESSURE: 169 MMHG | HEIGHT: 66 IN | BODY MASS INDEX: 29.16 KG/M2

## 2024-04-04 DIAGNOSIS — Z17.1 MALIGNANT NEOPLASM OF UPPER-INNER QUADRANT OF LEFT BREAST IN FEMALE, ESTROGEN RECEPTOR NEGATIVE: Primary | ICD-10-CM

## 2024-04-04 DIAGNOSIS — C50.212 MALIGNANT NEOPLASM OF UPPER-INNER QUADRANT OF LEFT BREAST IN FEMALE, ESTROGEN RECEPTOR NEGATIVE: Primary | ICD-10-CM

## 2024-04-04 PROCEDURE — 3008F BODY MASS INDEX DOCD: CPT | Mod: CPTII,S$GLB,, | Performed by: RADIOLOGY

## 2024-04-04 PROCEDURE — 99204 OFFICE O/P NEW MOD 45 MIN: CPT | Mod: S$GLB,,, | Performed by: RADIOLOGY

## 2024-04-04 PROCEDURE — 3080F DIAST BP >= 90 MM HG: CPT | Mod: CPTII,S$GLB,, | Performed by: RADIOLOGY

## 2024-04-04 PROCEDURE — 99999 PR PBB SHADOW E&M-EST. PATIENT-LVL III: CPT | Mod: PBBFAC,,, | Performed by: RADIOLOGY

## 2024-04-04 PROCEDURE — 1159F MED LIST DOCD IN RCRD: CPT | Mod: CPTII,S$GLB,, | Performed by: RADIOLOGY

## 2024-04-04 PROCEDURE — 3077F SYST BP >= 140 MM HG: CPT | Mod: CPTII,S$GLB,, | Performed by: RADIOLOGY

## 2024-04-04 PROCEDURE — 1160F RVW MEDS BY RX/DR IN RCRD: CPT | Mod: CPTII,S$GLB,, | Performed by: RADIOLOGY

## 2024-04-16 ENCOUNTER — HOSPITAL ENCOUNTER (OUTPATIENT)
Dept: RADIATION THERAPY | Facility: HOSPITAL | Age: 54
Discharge: HOME | End: 2024-04-16
Attending: STUDENT IN AN ORGANIZED HEALTH CARE EDUCATION/TRAINING PROGRAM
Payer: COMMERCIAL

## 2024-04-16 PROCEDURE — 77332 RADIATION TREATMENT AID(S): CPT | Mod: 26,,, | Performed by: RADIOLOGY

## 2024-04-16 PROCEDURE — 77332 RADIATION TREATMENT AID(S): CPT | Mod: TC | Performed by: RADIOLOGY

## 2024-04-16 PROCEDURE — 77014 HC CT GUIDANCE RADIATION THERAPY FLDS PLACEMENT: CPT | Mod: TC | Performed by: RADIOLOGY

## 2024-04-16 PROCEDURE — 77290 THER RAD SIMULAJ FIELD CPLX: CPT | Mod: TC | Performed by: RADIOLOGY

## 2024-04-16 PROCEDURE — 77263 THER RADIOLOGY TX PLNG CPLX: CPT | Mod: ,,, | Performed by: RADIOLOGY

## 2024-04-16 PROCEDURE — 77290 THER RAD SIMULAJ FIELD CPLX: CPT | Mod: 26,,, | Performed by: RADIOLOGY

## 2024-04-25 ENCOUNTER — TELEPHONE (OUTPATIENT)
Dept: HEMATOLOGY/ONCOLOGY | Facility: CLINIC | Age: 54
End: 2024-04-25
Payer: COMMERCIAL

## 2024-04-25 NOTE — TELEPHONE ENCOUNTER
Left voicemail for patient asking her to call back to reschedule appointment on 4/30 due to provider being out.

## 2024-04-26 ENCOUNTER — TELEPHONE (OUTPATIENT)
Dept: HEMATOLOGY/ONCOLOGY | Facility: CLINIC | Age: 54
End: 2024-04-26
Payer: COMMERCIAL

## 2024-04-26 PROCEDURE — 77334 RADIATION TREATMENT AID(S): CPT | Mod: TC | Performed by: RADIOLOGY

## 2024-04-26 PROCEDURE — 77334 RADIATION TREATMENT AID(S): CPT | Mod: 26,,, | Performed by: RADIOLOGY

## 2024-04-26 PROCEDURE — 77295 3-D RADIOTHERAPY PLAN: CPT | Mod: TC | Performed by: RADIOLOGY

## 2024-04-26 PROCEDURE — 77293 RESPIRATOR MOTION MGMT SIMUL: CPT | Mod: 26,,, | Performed by: RADIOLOGY

## 2024-04-26 PROCEDURE — 77293 RESPIRATOR MOTION MGMT SIMUL: CPT | Mod: TC | Performed by: RADIOLOGY

## 2024-04-26 PROCEDURE — 77295 3-D RADIOTHERAPY PLAN: CPT | Mod: 26,,, | Performed by: RADIOLOGY

## 2024-04-26 PROCEDURE — 77300 RADIATION THERAPY DOSE PLAN: CPT | Mod: 26,,, | Performed by: RADIOLOGY

## 2024-04-26 PROCEDURE — 77300 RADIATION THERAPY DOSE PLAN: CPT | Mod: TC | Performed by: RADIOLOGY

## 2024-05-01 ENCOUNTER — HOSPITAL ENCOUNTER (OUTPATIENT)
Dept: RADIATION THERAPY | Facility: HOSPITAL | Age: 54
Discharge: HOME | End: 2024-05-01
Attending: RADIOLOGY
Payer: COMMERCIAL

## 2024-05-01 ENCOUNTER — OFFICE VISIT (OUTPATIENT)
Dept: HEMATOLOGY/ONCOLOGY | Facility: CLINIC | Age: 54
End: 2024-05-01
Payer: COMMERCIAL

## 2024-05-01 VITALS
OXYGEN SATURATION: 99 % | TEMPERATURE: 97 F | RESPIRATION RATE: 17 BRPM | SYSTOLIC BLOOD PRESSURE: 131 MMHG | WEIGHT: 184.5 LBS | BODY MASS INDEX: 29.65 KG/M2 | HEIGHT: 66 IN | HEART RATE: 69 BPM | DIASTOLIC BLOOD PRESSURE: 77 MMHG

## 2024-05-01 DIAGNOSIS — T45.1X5A CHEMOTHERAPY-INDUCED NEUROPATHY: Primary | ICD-10-CM

## 2024-05-01 DIAGNOSIS — G62.0 CHEMOTHERAPY-INDUCED NEUROPATHY: Primary | ICD-10-CM

## 2024-05-01 DIAGNOSIS — Z17.1 MALIGNANT NEOPLASM OF UPPER-INNER QUADRANT OF LEFT BREAST IN FEMALE, ESTROGEN RECEPTOR NEGATIVE: ICD-10-CM

## 2024-05-01 DIAGNOSIS — R53.83 FATIGUE, UNSPECIFIED TYPE: ICD-10-CM

## 2024-05-01 DIAGNOSIS — C50.212 MALIGNANT NEOPLASM OF UPPER-INNER QUADRANT OF LEFT BREAST IN FEMALE, ESTROGEN RECEPTOR NEGATIVE: ICD-10-CM

## 2024-05-01 PROCEDURE — 99215 OFFICE O/P EST HI 40 MIN: CPT | Mod: S$PBB,,, | Performed by: STUDENT IN AN ORGANIZED HEALTH CARE EDUCATION/TRAINING PROGRAM

## 2024-05-01 PROCEDURE — 99999 PR PBB SHADOW E&M-EST. PATIENT-LVL III: CPT | Mod: PBBFAC,,, | Performed by: STUDENT IN AN ORGANIZED HEALTH CARE EDUCATION/TRAINING PROGRAM

## 2024-05-01 RX ORDER — DICLOFENAC SODIUM 50 MG/1
50 TABLET, DELAYED RELEASE ORAL 3 TIMES DAILY PRN
Qty: 30 TABLET | Refills: 1 | OUTPATIENT
Start: 2024-05-01 | End: 2025-05-01

## 2024-05-01 RX ORDER — GABAPENTIN 300 MG/1
300 CAPSULE ORAL 3 TIMES DAILY
Qty: 270 CAPSULE | Refills: 3 | Status: SHIPPED | OUTPATIENT
Start: 2024-05-01 | End: 2025-05-01

## 2024-05-01 NOTE — PROGRESS NOTES
[facility]/ [facility]      Chief Complaint:   TNBC     Cancer Staging   Invasive ductal carcinoma of breast  Staging form: Breast, AJCC 8th Edition  - Clinical stage from 2023: Stage IB (cT1c, cN0, cM0, G3, ER-, KS-, HER2-) - Signed by Sonia Johnson MD on 2023    HPI:  Teresa Valdes is a 53 y.o. female who presents today for evaluation of newly diagnosed breast cancer. Her oncologic history is as follows:    -screening MMG 23 showing an asymmetry in the central L breast. Diagnostic MMG/US revealing a 19 x 12 x17mm complex cystic and solid mass seen in the L breast at 11oclock. No left axillary LAD   -23 biopsy confirming IDC, grade 3. ER negative, KS negative, Her2 2+, negative FISH. Ki67 80%  -23 MRI breast showed 1.6 x 1.0 x 1.2 cm left breast mass with no associated lymphadenopathy.  -2023 started na ddAC-T; completed end of 2024  -s/p L lumpectomy 3/6/24, final pathology demonstrating pCR, ypT0N0  -getting ready to start adjuvant radiation, should complete 24    Gyn History:   Menarche: 14  Menopause: 46    Age at first pregnancy: 18  : Yes, with second child    Interval History:  Teresa presents today for follow up. Has been doing fairly well since her last visit. She has noted worsening tooth sensitivity recently, particularly on the Rt side. She has a dentist who she follows with but has not seen yet for this. She also reports worsening neuropathy in her feet bilaterally and restless leg symptoms making it difficult to sleep at night. Recently took diclofenac which helped with leg discomfort some.      Patient Active Problem List   Diagnosis    Encounter for colorectal cancer screening    Anxiety and depression    Overweight (BMI 25.0-29.9)    Need for hepatitis C screening test    Need for shingles vaccine    Bunion of great toe of left foot    Laceration of skin of forehead    Invasive  ductal carcinoma of breast    Malignant neoplasm of upper-inner quadrant of left breast in female, estrogen receptor negative       Current Outpatient Medications   Medication Instructions    diclofenac (VOLTAREN) 50 mg, Oral, 3 times daily PRN    ergocalciferol (ERGOCALCIFEROL) 50,000 Units, Oral, Every 7 days    gabapentin (NEURONTIN) 300 mg, Oral, 3 times daily    multivitamin capsule 1 capsule, Oral, Daily    oxyCODONE (ROXICODONE) 5 mg, Oral, Every 4 hours PRN    venlafaxine (EFFEXOR) 37.5 mg, Oral, Daily       Review of Systems:   +neuropathy  +restless leg  12pt ROS negative except as above     PHYSICAL EXAM:  ECOG 0   General: well appearing, in no apparent distress  HEENT: Normocephalic, EOMI, anicteric sclerae, MMM  Heart: well-perfused  Lungs: no increased wob  Breast: s/p L breast lumpectomy and bilateral reduction, incisions healing well. Very small area beneath L breast incision remains open with no drainage  Abdomen: Soft, nontender, nondistended with normal bowel sounds. No hepatosplenomegaly.  Extremities: No LE edema or joint effusion  Skin: warm, well-perfused, no rash  Neurologic: Alert and oriented x 4, normal speech and gait   Psychiatric: Conversing appropriately with providers throughout today's encounter.      Reviewed all recent labs, imaging and pathology.    Assessment & Plan:  Teresa is a dimitry 54yo woman with recently diagnosed cT1cN0 TNBC.     Given disease <2cm, proceeded with naddAC-T which she completed 1/2024. She is now s/p L lumpectomy with final pathology confirming pCR.       #TNBC:  --proceed with adjuvant radiation as scheduled, should complete this 5/23/24  --will cont to monitor on surveillance following this (q4 mo for the first 5 years, then annually)  --will be due for screening MMG 11/2024    #Neuropathy:  --will order trial of gabapentin 300mg TID, ok to take 600mg qhs if that works better. This may also help with restless leg symptoms       #Fatigue:  chemotherapy-induced, suspect due to cumulative treatment effect and now surgical recovery  --improving    #Neuropathy: bilateral fingertips and now toes. Getting better   --not interested in acupuncture at this time  --will cont to monitor this closely       All questions were answered to her apparent satisfaction. Will see her back in 3mo or sooner should the need arise.     Sonia Johnson MD       Route Chart for Scheduling    Med Onc Chart Routing      Follow up with physician 3 months.   Follow up with DARIANA    Infusion scheduling note    Injection scheduling note    Labs None   Scheduling:  Preferred lab:  Lab interval:     Imaging None      Pharmacy appointment No pharmacy appointment needed      Other referrals no referral to Oncology Primary Care needed -  no Massage appointment needed    No additional referrals needed                   MDM includes  :    - Acute or chronic illness or injury that poses a threat to life or bodily function  - Review of prior external notes from unique source  - Independent review and explanation of 3+ results from unique tests  - Discussion of management and ordering 3+ unique tests  - Extensive discussion of treatment and management  - Prescription drug management  - Drug therapy requiring intensive monitoring for toxicity

## 2024-05-03 ENCOUNTER — DOCUMENTATION ONLY (OUTPATIENT)
Dept: RADIATION ONCOLOGY | Facility: CLINIC | Age: 54
End: 2024-05-03
Payer: COMMERCIAL

## 2024-05-03 PROCEDURE — 77280 THER RAD SIMULAJ FIELD SMPL: CPT | Mod: 26,,, | Performed by: RADIOLOGY

## 2024-05-03 PROCEDURE — 77280 THER RAD SIMULAJ FIELD SMPL: CPT | Mod: TC | Performed by: RADIOLOGY

## 2024-05-06 PROCEDURE — G6002 STEREOSCOPIC X-RAY GUIDANCE: HCPCS | Mod: 26,,, | Performed by: RADIOLOGY

## 2024-05-06 PROCEDURE — 77427 RADIATION TX MANAGEMENT X5: CPT | Mod: ,,, | Performed by: RADIOLOGY

## 2024-05-06 PROCEDURE — 77412 RADIATION TX DELIVERY LVL 3: CPT | Performed by: RADIOLOGY

## 2024-05-06 PROCEDURE — 77387 GUIDANCE FOR RADJ TX DLVR: CPT | Mod: TC | Performed by: RADIOLOGY

## 2024-05-07 ENCOUNTER — DOCUMENTATION ONLY (OUTPATIENT)
Dept: RADIATION ONCOLOGY | Facility: CLINIC | Age: 54
End: 2024-05-07
Payer: COMMERCIAL

## 2024-05-07 PROCEDURE — 77412 RADIATION TX DELIVERY LVL 3: CPT | Performed by: RADIOLOGY

## 2024-05-07 PROCEDURE — 77387 GUIDANCE FOR RADJ TX DLVR: CPT | Mod: TC | Performed by: RADIOLOGY

## 2024-05-07 PROCEDURE — G6002 STEREOSCOPIC X-RAY GUIDANCE: HCPCS | Mod: 26,,, | Performed by: RADIOLOGY

## 2024-05-07 NOTE — PLAN OF CARE
Day 2 of outpatient radiation to the left breast.Tolerating treatment well. Using Miaderm cream BID.

## 2024-05-08 PROCEDURE — G6002 STEREOSCOPIC X-RAY GUIDANCE: HCPCS | Mod: 26,,, | Performed by: RADIOLOGY

## 2024-05-08 PROCEDURE — 77412 RADIATION TX DELIVERY LVL 3: CPT | Performed by: RADIOLOGY

## 2024-05-08 PROCEDURE — 77387 GUIDANCE FOR RADJ TX DLVR: CPT | Mod: TC | Performed by: RADIOLOGY

## 2024-05-09 PROCEDURE — 77387 GUIDANCE FOR RADJ TX DLVR: CPT | Mod: TC | Performed by: RADIOLOGY

## 2024-05-09 PROCEDURE — G6002 STEREOSCOPIC X-RAY GUIDANCE: HCPCS | Mod: 26,,, | Performed by: RADIOLOGY

## 2024-05-09 PROCEDURE — 77412 RADIATION TX DELIVERY LVL 3: CPT | Performed by: RADIOLOGY

## 2024-05-09 PROCEDURE — 77336 RADIATION PHYSICS CONSULT: CPT | Performed by: RADIOLOGY

## 2024-05-13 PROCEDURE — 77412 RADIATION TX DELIVERY LVL 3: CPT | Performed by: RADIOLOGY

## 2024-05-13 PROCEDURE — G6002 STEREOSCOPIC X-RAY GUIDANCE: HCPCS | Mod: 26,,, | Performed by: RADIOLOGY

## 2024-05-13 PROCEDURE — 77387 GUIDANCE FOR RADJ TX DLVR: CPT | Mod: TC | Performed by: RADIOLOGY

## 2024-05-14 ENCOUNTER — DOCUMENTATION ONLY (OUTPATIENT)
Dept: RADIATION ONCOLOGY | Facility: CLINIC | Age: 54
End: 2024-05-14
Payer: COMMERCIAL

## 2024-05-14 PROCEDURE — 77412 RADIATION TX DELIVERY LVL 3: CPT | Performed by: RADIOLOGY

## 2024-05-14 PROCEDURE — G6002 STEREOSCOPIC X-RAY GUIDANCE: HCPCS | Mod: 26,,, | Performed by: RADIOLOGY

## 2024-05-14 PROCEDURE — 77387 GUIDANCE FOR RADJ TX DLVR: CPT | Mod: TC | Performed by: RADIOLOGY

## 2024-05-14 PROCEDURE — 77427 RADIATION TX MANAGEMENT X5: CPT | Mod: ,,, | Performed by: RADIOLOGY

## 2024-05-14 PROCEDURE — 77417 THER RADIOLOGY PORT IMAGE(S): CPT | Performed by: RADIOLOGY

## 2024-05-14 NOTE — PLAN OF CARE
Day 6 of outpatient radiation to the left breast. Mild erythema noted to the breast. Reports itching in inframammary fold. May use OTC hydrocortisone cream to the inframammary fold. Using Miaderm cream BID.

## 2024-05-15 PROCEDURE — 77412 RADIATION TX DELIVERY LVL 3: CPT | Performed by: RADIOLOGY

## 2024-05-15 PROCEDURE — G6002 STEREOSCOPIC X-RAY GUIDANCE: HCPCS | Mod: 26,,, | Performed by: RADIOLOGY

## 2024-05-15 PROCEDURE — 77387 GUIDANCE FOR RADJ TX DLVR: CPT | Mod: TC | Performed by: RADIOLOGY

## 2024-05-16 PROCEDURE — 77412 RADIATION TX DELIVERY LVL 3: CPT | Performed by: RADIOLOGY

## 2024-05-16 PROCEDURE — G6002 STEREOSCOPIC X-RAY GUIDANCE: HCPCS | Mod: 26,,, | Performed by: RADIOLOGY

## 2024-05-16 PROCEDURE — 77387 GUIDANCE FOR RADJ TX DLVR: CPT | Mod: TC | Performed by: RADIOLOGY

## 2024-05-17 PROCEDURE — 77387 GUIDANCE FOR RADJ TX DLVR: CPT | Mod: TC | Performed by: RADIOLOGY

## 2024-05-17 PROCEDURE — G6002 STEREOSCOPIC X-RAY GUIDANCE: HCPCS | Mod: 26,,, | Performed by: RADIOLOGY

## 2024-05-17 PROCEDURE — 77412 RADIATION TX DELIVERY LVL 3: CPT | Performed by: RADIOLOGY

## 2024-05-20 PROCEDURE — 77412 RADIATION TX DELIVERY LVL 3: CPT | Performed by: RADIOLOGY

## 2024-05-20 PROCEDURE — 77336 RADIATION PHYSICS CONSULT: CPT | Performed by: RADIOLOGY

## 2024-05-20 PROCEDURE — G6002 STEREOSCOPIC X-RAY GUIDANCE: HCPCS | Mod: 26,,, | Performed by: RADIOLOGY

## 2024-05-20 PROCEDURE — 77387 GUIDANCE FOR RADJ TX DLVR: CPT | Mod: TC | Performed by: RADIOLOGY

## 2024-05-21 ENCOUNTER — DOCUMENTATION ONLY (OUTPATIENT)
Dept: RADIATION ONCOLOGY | Facility: CLINIC | Age: 54
End: 2024-05-21
Payer: COMMERCIAL

## 2024-05-21 PROCEDURE — 77412 RADIATION TX DELIVERY LVL 3: CPT | Performed by: RADIOLOGY

## 2024-05-21 PROCEDURE — 77417 THER RADIOLOGY PORT IMAGE(S): CPT | Performed by: RADIOLOGY

## 2024-05-21 PROCEDURE — 77387 GUIDANCE FOR RADJ TX DLVR: CPT | Mod: TC | Performed by: RADIOLOGY

## 2024-05-21 PROCEDURE — 77427 RADIATION TX MANAGEMENT X5: CPT | Mod: ,,, | Performed by: RADIOLOGY

## 2024-05-21 PROCEDURE — G6002 STEREOSCOPIC X-RAY GUIDANCE: HCPCS | Mod: 26,,, | Performed by: RADIOLOGY

## 2024-05-21 NOTE — PLAN OF CARE
Day 11 of outpatient radiation to the left breast, DIBH. Moderate erythema noted to breast . Reports tenderness. Gel sheets given for comfort.Continue use of Miaderm cream. May apply TID.

## 2024-05-22 PROCEDURE — 77387 GUIDANCE FOR RADJ TX DLVR: CPT | Mod: TC | Performed by: RADIOLOGY

## 2024-05-22 PROCEDURE — 77412 RADIATION TX DELIVERY LVL 3: CPT | Performed by: RADIOLOGY

## 2024-05-22 PROCEDURE — G6002 STEREOSCOPIC X-RAY GUIDANCE: HCPCS | Mod: 26,,, | Performed by: RADIOLOGY

## 2024-05-23 PROCEDURE — 77387 GUIDANCE FOR RADJ TX DLVR: CPT | Mod: TC | Performed by: RADIOLOGY

## 2024-05-23 PROCEDURE — 77412 RADIATION TX DELIVERY LVL 3: CPT | Performed by: RADIOLOGY

## 2024-05-23 PROCEDURE — G6002 STEREOSCOPIC X-RAY GUIDANCE: HCPCS | Mod: 26,,, | Performed by: RADIOLOGY

## 2024-05-24 PROCEDURE — G6002 STEREOSCOPIC X-RAY GUIDANCE: HCPCS | Mod: 26,,, | Performed by: RADIOLOGY

## 2024-05-24 PROCEDURE — 77387 GUIDANCE FOR RADJ TX DLVR: CPT | Mod: TC | Performed by: RADIOLOGY

## 2024-05-24 PROCEDURE — 77412 RADIATION TX DELIVERY LVL 3: CPT | Performed by: RADIOLOGY

## 2024-05-28 ENCOUNTER — PATIENT MESSAGE (OUTPATIENT)
Dept: ADMINISTRATIVE | Facility: HOSPITAL | Age: 54
End: 2024-05-28
Payer: COMMERCIAL

## 2024-05-31 ENCOUNTER — DOCUMENTATION ONLY (OUTPATIENT)
Dept: RADIATION ONCOLOGY | Facility: CLINIC | Age: 54
End: 2024-05-31
Payer: COMMERCIAL

## 2024-05-31 PROCEDURE — G6002 STEREOSCOPIC X-RAY GUIDANCE: HCPCS | Mod: 26,,, | Performed by: RADIOLOGY

## 2024-05-31 PROCEDURE — 77387 GUIDANCE FOR RADJ TX DLVR: CPT | Mod: TC | Performed by: RADIOLOGY

## 2024-05-31 PROCEDURE — 77412 RADIATION TX DELIVERY LVL 3: CPT | Performed by: RADIOLOGY

## 2024-05-31 NOTE — PLAN OF CARE
Day 15 of outpatient radiatio to the left breast. Moderate erythema noted to the breast.Acneiform reaction noted. Positive for itching. C/o discomfort to the axilla. May use OTC hydrocortisone cream with Miaderm cream to help with itching. F/u appt wt Dr Valle in 6 weeks given.

## 2024-06-03 ENCOUNTER — HOSPITAL ENCOUNTER (OUTPATIENT)
Dept: RADIATION THERAPY | Facility: HOSPITAL | Age: 54
Discharge: HOME | End: 2024-06-03
Attending: RADIOLOGY
Payer: COMMERCIAL

## 2024-06-03 PROCEDURE — 77387 GUIDANCE FOR RADJ TX DLVR: CPT | Mod: TC | Performed by: RADIOLOGY

## 2024-06-03 PROCEDURE — G6002 STEREOSCOPIC X-RAY GUIDANCE: HCPCS | Mod: 26,,, | Performed by: RADIOLOGY

## 2024-06-03 PROCEDURE — 77336 RADIATION PHYSICS CONSULT: CPT | Performed by: RADIOLOGY

## 2024-06-03 PROCEDURE — 77412 RADIATION TX DELIVERY LVL 3: CPT | Performed by: RADIOLOGY

## 2024-06-04 ENCOUNTER — DOCUMENTATION ONLY (OUTPATIENT)
Dept: RADIATION ONCOLOGY | Facility: CLINIC | Age: 54
End: 2024-06-04
Payer: COMMERCIAL

## 2024-06-04 NOTE — PLAN OF CARE
Outpatient radiation to the left breast completed 6/3/24. Pt to f/u jaleel Valle in 6 weeks. Appt given.

## 2024-06-18 ENCOUNTER — TELEPHONE (OUTPATIENT)
Dept: RADIATION ONCOLOGY | Facility: CLINIC | Age: 54
End: 2024-06-18
Payer: COMMERCIAL

## 2024-06-18 NOTE — TELEPHONE ENCOUNTER
Call to pt to see how she is doing since completing left breast radiation 6/3/24. Pt states her skin is feeling better, had some burning sensation towards the end of treatment. Reports feeling a lump in the axilla where lymp node was removed. Pt has an appt f/u with Plastic Surgery next week. Will have them look at it ad=nd decide if sooner appt is needed with Dr Valle. Pt has f/u with Eric 7/16/24.

## 2024-06-25 ENCOUNTER — OFFICE VISIT (OUTPATIENT)
Dept: PLASTIC SURGERY | Facility: CLINIC | Age: 54
End: 2024-06-25
Payer: COMMERCIAL

## 2024-06-25 VITALS — DIASTOLIC BLOOD PRESSURE: 77 MMHG | HEART RATE: 78 BPM | RESPIRATION RATE: 20 BRPM | SYSTOLIC BLOOD PRESSURE: 131 MMHG

## 2024-06-25 DIAGNOSIS — C50.912 INFILTRATING DUCTAL CARCINOMA OF LEFT BREAST: ICD-10-CM

## 2024-06-25 DIAGNOSIS — Z90.13 STATUS POST BILATERAL MASTECTOMY: Primary | ICD-10-CM

## 2024-06-25 PROCEDURE — 3075F SYST BP GE 130 - 139MM HG: CPT | Mod: CPTII,S$GLB,, | Performed by: PHYSICIAN ASSISTANT

## 2024-06-25 PROCEDURE — 99999 PR PBB SHADOW E&M-EST. PATIENT-LVL III: CPT | Mod: PBBFAC,,, | Performed by: PHYSICIAN ASSISTANT

## 2024-06-25 PROCEDURE — 99213 OFFICE O/P EST LOW 20 MIN: CPT | Mod: S$GLB,,, | Performed by: PHYSICIAN ASSISTANT

## 2024-06-25 PROCEDURE — 3078F DIAST BP <80 MM HG: CPT | Mod: CPTII,S$GLB,, | Performed by: PHYSICIAN ASSISTANT

## 2024-06-25 PROCEDURE — 1159F MED LIST DOCD IN RCRD: CPT | Mod: CPTII,S$GLB,, | Performed by: PHYSICIAN ASSISTANT

## 2024-06-26 NOTE — PROGRESS NOTES
Teresa Valdes presents to [facility]astic Surgery [facility] for a follow up visit status post oncoplastic breast reduction, left lumpectomy on 3/6/24.  She completed radiation a few weeks ago. She noticed a painful lump near her IMF incision on the left breast after radiation finished. She's concerned about recurrence.  She denies fever, chills, nausea, vomiting or other systemic signs of infection.     Of note she is tearful today discussing a close friend who recently  with breast cancer.      ROS - negative, other than stated above    PHYSICAL EXAMINATION  Vitals:    24 1139   BP: 131/77   Pulse: 78   Resp: 20       Gen: NAD, appears stated age  Neuro: normal without focal findings, mental status and speech normal  HEENT: NCAT, neck supple, PEERL  CV: RRR  Pulm: Breathing non-labored, chest wall movement equal bilaterally   Breast:  good shape and symmetry. Left breast radiation changes of skin with erythema. Painful, well circumscribed lesion af the left breast at 5 oclock.  Abdomen: soft, nontender, no guarding  Gu: genitalia not examined  Extremity:normal strength, no cyanosis or edema  Skin: radiation changes of left breast  Psych: oriented to time, place and person      ASSESSMENT/PLAN  53 y.o. female s/p left lumpectomy with oncoplastic reduction    XRT finished  New painful mass - discussed with breast surgery and they will schedule a follow up with her. Diagnostic MMG ordered.    All questions were answered. The patient was advised to contact the clinic with any questions or concerns prior to their next visit.   Follow up 6 months    Courtney Browning PA-C  Plastic and Reconstructive Surgery      I spent a total of 20 minutes on the day of the visit.This includes face to face time and non-face to face time preparing to see the patient (eg, review of tests), obtaining and/or reviewing separately obtained history, documenting clinical information in the electronic or other health record, independently  interpreting results and communicating results to the patient/family/caregiver, or care coordinator.

## 2024-06-27 ENCOUNTER — TELEPHONE (OUTPATIENT)
Dept: RADIOLOGY | Facility: HOSPITAL | Age: 54
End: 2024-06-27
Payer: COMMERCIAL

## 2024-06-28 ENCOUNTER — OFFICE VISIT (OUTPATIENT)
Dept: OBSTETRICS AND GYNECOLOGY | Facility: CLINIC | Age: 54
End: 2024-06-28
Payer: COMMERCIAL

## 2024-06-28 VITALS
SYSTOLIC BLOOD PRESSURE: 100 MMHG | WEIGHT: 183.44 LBS | DIASTOLIC BLOOD PRESSURE: 71 MMHG | BODY MASS INDEX: 29.61 KG/M2

## 2024-06-28 DIAGNOSIS — N60.02 BREAST CYST, LEFT: ICD-10-CM

## 2024-06-28 DIAGNOSIS — Z01.419 WELL WOMAN EXAM WITH ROUTINE GYNECOLOGICAL EXAM: Primary | ICD-10-CM

## 2024-06-28 PROCEDURE — 88175 CYTOPATH C/V AUTO FLUID REDO: CPT | Performed by: OBSTETRICS & GYNECOLOGY

## 2024-06-28 PROCEDURE — 99999 PR PBB SHADOW E&M-EST. PATIENT-LVL III: CPT | Mod: PBBFAC,,, | Performed by: OBSTETRICS & GYNECOLOGY

## 2024-06-28 NOTE — PROGRESS NOTES
CC: Annual check-up    SUBJECTIVE:   53 y.o. female   for annual routine Pap and checkup. Patient's last menstrual period was 10/15/2018 (within weeks)..  She has no unusual complaints and dx'd with Breast CA last year, finished treatment Lumpectomy with reconstruction and Chemo/xrt  Has a known  post surgical cyst on left lower breast occ get sore being followed with mmg  Has MMG scheduled   Past Medical History:   Diagnosis Date    Anxiety     Depression     Hypertension     resolved    Ovarian cancer     Sebaceous cyst 2018     Past Surgical History:   Procedure Laterality Date    ADENOIDECTOMY      BREAST CYST EXCISION Right     BREAST SURGERY      Abscess     CHOLECYSTECTOMY      COLONOSCOPY N/A 2023    Procedure: COLONOSCOPY;  Surgeon: Mona Valverde MD;  Location: [facility];  Service: Endoscopy;  Laterality: N/A;    CYST REMOVAL      on back    cyst removed left wrist      INJECTION FOR SENTINEL NODE IDENTIFICATION Left 3/6/2024    Procedure: INJECTION, FOR SENTINEL NODE IDENTIFICATION;  Surgeon: Keke Sequeira MD;  Location: [facility];  Service: General;  Laterality: Left;    KNEE LIGAMENT RECONSTRUCTION Left     LUMPECTOMY,BREAST,WITH RADIOACTIVE SEED LOCALIZATION Left 3/6/2024    Procedure: LUMPECTOMY,BREAST,WITH RADIOACTIVE SEED LOCALIZATION / RADIOLOGICAL MARKER;  Surgeon: Keke Sequeira MD;  Location: [facility];  Service: General;  Laterality: Left;  1.5 HOURS    PORTACATH PLACEMENT      right chest wall    REDUCTION OF BOTH BREASTS Bilateral 3/6/2024    Procedure: MAMMOPLASTY, REDUCTION, BILATERAL / ONCOPLASTIC REDUCTION;  Surgeon: Demond Patel DO;  Location: [facility];  Service: General;  Laterality: Bilateral;  3 HOURS ON PAPER    SENTINEL LYMPH NODE BIOPSY Left 3/6/2024    Procedure: BIOPSY, LYMPH NODE, SENTINEL;  Surgeon: Keke Sequeira MD;  Location: [facility];  Service: General;  Laterality: Left;    sleeve gastroectomy  2017    in [place]    TONSILLECTOMY       Uvuloplasty       Social History     Socioeconomic History    Marital status:    Occupational History     Employer: [employer]   Tobacco Use    Smoking status: Former     Current packs/day: 0.50     Average packs/day: 0.5 packs/day for 7.3 years (3.7 ttl pk-yrs)     Types: Cigarettes     Start date: 3/7/2017    Smokeless tobacco: Never    Tobacco comments:     Working on quitting    Substance and Sexual Activity    Alcohol use: Yes     Alcohol/week: 2.0 standard drinks of alcohol     Types: 2 Glasses of wine per week     Comment: occ    Drug use: Never    Sexual activity: Yes     Partners: Male     Birth control/protection: Partner-Vasectomy     Comment:    Social History Narrative    She teaches Band at middle school. Lives with her  and 2 children in [facility]. She has an older daughter who she gave up for adoption. Exercising.      Social Determinants of Health     Financial Resource Strain: Medium Risk (1/10/2024)    Overall Financial Resource Strain (CARDIA)     Difficulty of Paying Living Expenses: Somewhat hard   Food Insecurity: No Food Insecurity (1/10/2024)    Hunger Vital Sign     Worried About Running Out of Food in the Last Year: Never true     Ran Out of Food in the Last Year: Never true   Transportation Needs: No Transportation Needs (1/10/2024)    PRAPARE - Transportation     Lack of Transportation (Medical): No     Lack of Transportation (Non-Medical): No   Physical Activity: Insufficiently Active (1/10/2024)    Exercise Vital Sign     Days of Exercise per Week: 2 days     Minutes of Exercise per Session: 10 min   Stress: Stress Concern Present (1/10/2024)    [employer] [employer] - Occupational Stress Questionnaire     Feeling of Stress : To some extent   Housing Stability: Low Risk  (1/10/2024)    Housing Stability Vital Sign     Unable to Pay for Housing in the Last Year: No     Number of Places Lived in the Last Year: 2      Unstable Housing in the Last Year: No     Family History   Problem Relation Name Age of Onset    Arthritis Mother      Hypertension Father      Heart disease Father      Breast cancer Paternal Grandmother      Cancer Neg Hx       OB History    Para Term  AB Living   5 4 4   1 4   SAB IAB Ectopic Multiple Live Births   1       4      # Outcome Date GA Lbr Frankie/2nd Weight Sex Type Anes PTL Lv   5 Term 02    M Vag-Spont   ALE   4 Term 00    F Vag-Spont   ALE   3 Term 89    F Vag-Spont   ALE   2 Term         ALE   1 SAB                  Current Outpatient Medications   Medication Sig Dispense Refill    diclofenac (VOLTAREN) 50 MG EC tablet Take 1 tablet (50 mg total) by mouth 3 (three) times daily as needed (pain). 30 tablet 1    ergocalciferol (ERGOCALCIFEROL) 50,000 unit Cap Take 1 capsule (50,000 Units total) by mouth every 7 days. 12 capsule 3    gabapentin (NEURONTIN) 300 MG capsule Take 1 capsule (300 mg total) by mouth 3 (three) times daily. 270 capsule 3    multivitamin capsule Take 1 capsule by mouth once daily.      oxyCODONE (ROXICODONE) 5 MG immediate release tablet Take 1 tablet (5 mg total) by mouth every 4 (four) hours as needed for Pain. 10 tablet 0    venlafaxine (EFFEXOR) 37.5 MG Tab Take 1 tablet (37.5 mg total) by mouth once daily. 90 tablet 3     No current facility-administered medications for this visit.     Allergies: Patient has no known allergies.     ROS:  Constitutional: no weight loss, weight gain, fever, fatigue  Eyes:  No vision changes, glasses/contacts  ENT/Mouth: No ulcers, sinus problems, ears ringing, headache  Cardiovascular: No inability to lie flat, chest pain, exercise intolerance, swelling, heart palpitations  Respiratory: No wheezing, coughing blood, shortness of breath, or cough  Gastrointestinal: No diarrhea, bloody stool, nausea/vomiting, constipation, gas, hemorrhoids  Genitourinary: No blood in urine, painful urination, urgency of  urination, frequency of urination, incomplete emptying, incontinence, abnormal bleeding, painful periods, heavy periods, vaginal discharge, vaginal odor, painful intercourse, sexual problems, bleeding after intercourse.  Musculoskeletal: No muscle weakness  Skin/Breast: No painful breasts, nipple discharge, masses, rash, ulcers  Neurological: No passing out, seizures, numbness, headache  Endocrine: No diabetes, hypothyroid, hyperthyroid, hot flashes, hair loss, abnormal hair growth, ance  Psychiatric: No depression, crying  Hematologic: No bruises, bleeding, swollen lymph nodes, anemia.      OBJECTIVE:   The patient appears well, alert, oriented x 3, in no distress.  /71   Wt 83.2 kg (183 lb 6.8 oz)   LMP 10/15/2018 (Within Weeks)   BMI 29.61 kg/m²   NECK: no thyromegaly, trachea midline  SKIN: no acne, striae, hirsutism  BREAST EXAM: breasts appear normal, no suspicious masses, no skin or nipple changes or axillary nodes, right breast normal without mass, skin or nipple changes or axillary nodes, left breast normal with 2cm cystic mass mid outer quadrant, skin or nipple changes or axillary nodes, surgical scars well healed  ABDOMEN: no hernias, masses, or hepatosplenomegaly  GENITALIA: normal external genitalia, no erythema, no discharge  URETHRA: normal urethra, normal urethral meatus  VAGINA: mucosal atrophy  CERVIX: no lesions or cervical motion tenderness  UTERUS: normal  ADNEXA: normal adnexa and no mass, fullness, tenderness      ASSESSMENT:   well woman  1. Well woman exam with routine gynecological exam    2. Breast cyst, left        PLAN:   Mammogram per onc recs  pap smear  return annually or prn  Orders Placed This Encounter    Liquid-Based Pap Smear, Screening

## 2024-06-29 LAB
CLINICAL INFO: NORMAL
DATE OF PREVIOUS PAP: NORMAL
DATE PREVIOUS BX: NO
LMP START DATE: NORMAL
SPECIMEN SOURCE CVX/VAG CYTO: NORMAL

## 2024-07-02 ENCOUNTER — PATIENT MESSAGE (OUTPATIENT)
Dept: SURGERY | Facility: CLINIC | Age: 54
End: 2024-07-02
Payer: COMMERCIAL

## 2024-07-11 ENCOUNTER — PATIENT MESSAGE (OUTPATIENT)
Dept: RADIATION ONCOLOGY | Facility: CLINIC | Age: 54
End: 2024-07-11
Payer: COMMERCIAL

## 2024-07-12 ENCOUNTER — TELEPHONE (OUTPATIENT)
Dept: OBSTETRICS AND GYNECOLOGY | Facility: CLINIC | Age: 54
End: 2024-07-12
Payer: COMMERCIAL

## 2024-07-12 NOTE — TELEPHONE ENCOUNTER
Your most recent pap smear was mildly abnormal. As you know, the pap smear is a screening test for cervical cancer.  Your test showed you did NOT have an HPV type that puts you at immediate risk of severe dysplasia or cancer and therefore does NOT warrant any further testing. All we should is another pap and rpt HPV test in 1yr.   Patrick Rodriguez MD  Plz notify pt   CONSTITUTIONAL: No fever, no chills, no fatigue  EYES: No eye redness, no visual changes  ENT: No ear pain, no sore throat  CARDIOVASCULAR: +chest pain, no palpitations  RESPIRATORY: No cough, no SOB  GI: No abdominal pain, no nausea, no vomiting, no constipation, no diarrhea  GENITOURINARY: No dysuria, no frequency, no hematuria  MUSCULOSKELETAL: No back pain, no joint pain, no myalgias  SKIN: No rash, no peripheral edema  NEURO: No headache, no confusion    ALL OTHER SYSTEMS NEGATIVE.

## 2024-07-12 NOTE — PROGRESS NOTES
REFERRING PHYSICIAN: Keke Sequeira MD, Sonia Johnson MD    DIAGNOSIS: [id number] (ypT0N0) invasive ductal carcinoma of the left breast     Radiation Treatment Summary  Course: C1 Breast L  Treatment Site Energy Dose/Fx (Gy) #Fx Total Dose (Gy) Start Date End Date Elapsed Days   Left breast 18X/6X 2.65 16 / 16 42.4 5/6/2024 6/3/2024 28     INTERVAL HISTORY:   Ms. Valdes is a 53-year-old female who completed postoperative radiation to the left breast as above who returns for routine follow-up.    She was diagnosed with left breast cancer after an abnormal mammogram in April 2023 which revealed a suspicious lesion in the upper inner quadrant of the left breast. A diagnostic mammogram in June 2023 revealed a 19 x 12 x 17 mm lesion in the 11 o'clock position of the left breast. A core needle biopsy of this lesion on July 24, 2023 revealed grade 3, invasive ductal carcinoma, which was ER/GA negative, and HER2 negative, with Ki-67 index of 80%. An MRI of the bilateral breasts on July 31, 2023 revealed a 1.6 x 1.0 x 1.2 cm irregular mass in the left breast without enlarged axillary or internal mammary lymph nodes. She received neoadjuvant chemotherapy with AC followed by Taxol. A repeat MRI of the bilateral breasts on February 5, 2024 revealed complete resolution of the left breast mass. She underwent left breast lumpectomy and axillary lymph node sampling as well as bilateral reduction mammoplasty on March 6, 2024. Pathology revealed residual 3 mm focus of grade 2 DCIS, without invasive component. All margins are widely negative for involvement. 3/3 sentinel lymph nodes and 1 additional lymph node from the left axilla are negative for involvement. To reduce the chance of local recurrence, she received postoperative radiation to the left breast as above.  She returns for routine follow-up.    At present, she denies left breast pain, edema, erythema, or nipple discharge.  She also denies fever, night sweats, or weight loss.  She  underwent bilateral mammogram and ultrasound earlier today due to palpable nodules in the left breast.  This revealed oil cyst at the site of palpable nodule.  She was recommended to repeat bilateral mammogram in 1 year.    PHYSICAL EXAMINATION:  Vitals:    07/16/24 1304   BP: 122/84   Pulse: 73   SpO2: 99%   Weight: 84.1 kg (185 lb 6.5 oz)   Body mass index is 29.93 kg/m².   GENERAL: Patient is alert and oriented, in no acute distress.  HEENT: Extraocular muscles are intact.  Oropharynx is clear without lesions.  There is no cervical or supraclavicular adenopathy palpated.    CHEST: Breath sounds clear bilaterally.  No rales.  No rhonchi.  Unlabored respirations.  CARDIOVASCULAR: Normal S1, S2.  Normal rate.  Regular rhythm.  BREAST EXAM: The left breast is smaller than the right.  Hyperpigmentation of the skin of the left breast secondary to radiation.  Bilateral breasts noted with scar secondary to reduction mammoplasty. Scar on the left breast with eschar inferiorly with mild surrounding erythema. No signs of infection. No abnormal masses palpated in the bilateral breasts or bilateral axilla.  ABDOMEN: Bowel sounds normal.  No tenderness.  No abdominal distention.  No hepatomegaly.  No splenomegaly.  EXTREMITIES: No clubbing, cyanosis, edema  NEUROLOGIC: Cranial nerves II through XII are grossly intact.  Sensation is intact.  Strength is 5 out of 5 in the upper and lower extremities bilaterally.    ASSESSMENT:   This is a 53-year-old female with cT1c N0 M0 (ypT0N0) grade 3, invasive ductal carcinoma of the left breast (upper inner quadrant) who underwent neoadjuvant chemotherapy followed by left breast lumpectomy and sentinel node biopsy as well as bilateral reduction mammoplasty on March 6, 2024 with residual 3 mm, grade 2, DCIS with widely negative margins, ER/MT negative, HER2 negative, Ki-67 index 80%.    PLAN:   Ms. Valdes is recovering from radiation without any unexpected side effects.  Skin care to the  treated area was again reviewed with the patient.  She will repeat yearly mammogram.  She will continue follow up with medical oncology as planned.  I plan to see her back as needed, unless symptoms warrant otherwise.  A referral to ambulatory psychology was placed at patient's request.

## 2024-07-16 ENCOUNTER — TELEPHONE (OUTPATIENT)
Dept: PSYCHIATRY | Facility: CLINIC | Age: 54
End: 2024-07-16
Payer: COMMERCIAL

## 2024-07-16 ENCOUNTER — OFFICE VISIT (OUTPATIENT)
Dept: SURGERY | Facility: CLINIC | Age: 54
End: 2024-07-16
Payer: COMMERCIAL

## 2024-07-16 ENCOUNTER — HOSPITAL ENCOUNTER (OUTPATIENT)
Dept: RADIOLOGY | Facility: HOSPITAL | Age: 54
Discharge: HOME | End: 2024-07-16
Attending: PHYSICIAN ASSISTANT
Payer: COMMERCIAL

## 2024-07-16 ENCOUNTER — OFFICE VISIT (OUTPATIENT)
Dept: RADIATION ONCOLOGY | Facility: CLINIC | Age: 54
End: 2024-07-16
Payer: COMMERCIAL

## 2024-07-16 VITALS
HEART RATE: 73 BPM | DIASTOLIC BLOOD PRESSURE: 84 MMHG | BODY MASS INDEX: 28.93 KG/M2 | HEIGHT: 66 IN | WEIGHT: 180 LBS | SYSTOLIC BLOOD PRESSURE: 122 MMHG

## 2024-07-16 VITALS
BODY MASS INDEX: 29.93 KG/M2 | DIASTOLIC BLOOD PRESSURE: 84 MMHG | WEIGHT: 185.44 LBS | HEART RATE: 73 BPM | SYSTOLIC BLOOD PRESSURE: 122 MMHG | OXYGEN SATURATION: 99 %

## 2024-07-16 DIAGNOSIS — Z85.3 PERSONAL HISTORY OF BREAST CANCER: Primary | ICD-10-CM

## 2024-07-16 DIAGNOSIS — Z98.890 S/P LUMPECTOMY, LEFT BREAST: ICD-10-CM

## 2024-07-16 DIAGNOSIS — C50.912 INFILTRATING DUCTAL CARCINOMA OF LEFT BREAST: ICD-10-CM

## 2024-07-16 DIAGNOSIS — Z90.13 STATUS POST BILATERAL MASTECTOMY: ICD-10-CM

## 2024-07-16 DIAGNOSIS — Z17.1 MALIGNANT NEOPLASM OF UPPER-INNER QUADRANT OF LEFT BREAST IN FEMALE, ESTROGEN RECEPTOR NEGATIVE: ICD-10-CM

## 2024-07-16 DIAGNOSIS — C50.912 INFILTRATING DUCTAL CARCINOMA OF LEFT BREAST: Primary | ICD-10-CM

## 2024-07-16 DIAGNOSIS — Z12.39 SCREENING BREAST EXAMINATION: ICD-10-CM

## 2024-07-16 DIAGNOSIS — C50.212 MALIGNANT NEOPLASM OF UPPER-INNER QUADRANT OF LEFT BREAST IN FEMALE, ESTROGEN RECEPTOR NEGATIVE: ICD-10-CM

## 2024-07-16 PROCEDURE — 3008F BODY MASS INDEX DOCD: CPT | Mod: CPTII,S$GLB,, | Performed by: PHYSICIAN ASSISTANT

## 2024-07-16 PROCEDURE — 76642 ULTRASOUND BREAST LIMITED: CPT | Mod: 26,LT,, | Performed by: RADIOLOGY

## 2024-07-16 PROCEDURE — 99999 PR PBB SHADOW E&M-EST. PATIENT-LVL III: CPT | Mod: PBBFAC,,, | Performed by: RADIOLOGY

## 2024-07-16 PROCEDURE — 1159F MED LIST DOCD IN RCRD: CPT | Mod: CPTII,S$GLB,, | Performed by: RADIOLOGY

## 2024-07-16 PROCEDURE — 99213 OFFICE O/P EST LOW 20 MIN: CPT | Mod: S$GLB,,, | Performed by: RADIOLOGY

## 2024-07-16 PROCEDURE — 77062 BREAST TOMOSYNTHESIS BI: CPT | Mod: 26,,, | Performed by: RADIOLOGY

## 2024-07-16 PROCEDURE — 3008F BODY MASS INDEX DOCD: CPT | Mod: CPTII,S$GLB,, | Performed by: RADIOLOGY

## 2024-07-16 PROCEDURE — 77062 BREAST TOMOSYNTHESIS BI: CPT | Mod: TC

## 2024-07-16 PROCEDURE — 1160F RVW MEDS BY RX/DR IN RCRD: CPT | Mod: CPTII,S$GLB,, | Performed by: RADIOLOGY

## 2024-07-16 PROCEDURE — 3074F SYST BP LT 130 MM HG: CPT | Mod: CPTII,S$GLB,, | Performed by: PHYSICIAN ASSISTANT

## 2024-07-16 PROCEDURE — 99204 OFFICE O/P NEW MOD 45 MIN: CPT | Mod: S$GLB,,, | Performed by: PHYSICIAN ASSISTANT

## 2024-07-16 PROCEDURE — 76642 ULTRASOUND BREAST LIMITED: CPT | Mod: TC,LT

## 2024-07-16 PROCEDURE — 3079F DIAST BP 80-89 MM HG: CPT | Mod: CPTII,S$GLB,, | Performed by: PHYSICIAN ASSISTANT

## 2024-07-16 PROCEDURE — 3074F SYST BP LT 130 MM HG: CPT | Mod: CPTII,S$GLB,, | Performed by: RADIOLOGY

## 2024-07-16 PROCEDURE — 77066 DX MAMMO INCL CAD BI: CPT | Mod: 26,,, | Performed by: RADIOLOGY

## 2024-07-16 PROCEDURE — 3079F DIAST BP 80-89 MM HG: CPT | Mod: CPTII,S$GLB,, | Performed by: RADIOLOGY

## 2024-07-16 PROCEDURE — 99999 PR PBB SHADOW E&M-EST. PATIENT-LVL III: CPT | Mod: PBBFAC,,, | Performed by: PHYSICIAN ASSISTANT

## 2024-07-16 NOTE — PROGRESS NOTES
[facility]  Department of Surgery    PCP: Maryse Abreu MD  CHIEF COMPLAINT:   Chief Complaint   Patient presents with    Follow-up       DIAGNOSIS:   This is a 53 y.o. female with a history of grade 3 ER - IN - HER2 - IDC of the left breast. Final pathology demonstrating pCR, ypT0N0.    TREATMENT:   1. NACT with ddACT completed 2024 with Dr. Sonia Johnson.   2. S/p left partial mastectomy and sentinel node biopsy with Dr. Sequeira with oncoplastic reduction with Dr. Patel on 3/6/2024.    3. Final Pathology showed no residual invasive carcinoma, 3 mm DCIS. Negative nodes. Margins clear.    4. XRT completed 24 with Dr. Valle.     HISTORY OF PRESENT ILLNESS:   Teresa Valdes is a 53 y.o. female comes in for oncological follow up.  She denies change in her breast self-exam specifically denying new masses, skin or nipple changes, or nipple discharge. Past medical and surgical history is updated without new changes. There have been no changes to family history. The patient denies constitutional symptoms of night sweats, chills, weight loss, new headaches, visual changes, new back or bony pain, chest pain, or shortness of breath.        Review of Systems: See HPI/Interval History for other systems reviewed.     IMAGIN/16/24 Bilateral Diagnostic Mammo/US Left Breast:    Findings:  This procedure was performed using tomosynthesis. Computer-aided detection was utilized in the interpretation of this examination.     The breasts are extremely dense, which lowers the sensitivity of mammography.      Left  Mammo Digital Diagnostic Bilat with German  There is an 18 mm oil cyst in the left breast at 3 o'clock posterior depth corresponding to one of the palpable abnormalities felt by the patient.      There is no evidence of suspicious masses, calcifications, or other abnormal findings in the left breast.     US Breast Left Limited  There is 15 mm oil cyst seen in the left breast at 2 o'clock 8 cm  "fn. This finding correlates with the palpable mass reported by the patient. No left axillary adenopathy.      Right  Mammo Digital Diagnostic Bilat with German  There is no evidence of suspicious masses, calcifications, or other abnormal findings in the right breast.     Bilateral  Mammo Digital Diagnostic Bilat with German  There are post-surgical findings from a previous breast reduction seen in both breasts. There has been no interval development of a suspicious mass, microcalcification, or architectural distortion. There is no evidence of suspicious masses, calcifications, or other abnormal findings.     Port obscures evaluation of the right axilla.      Impression:  Bilateral  Mammo Digital Diagnostic Bilat with German  There is no mammographic evidence of malignancy.     Left  US Breast Left Limited  There is no sonographic evidence of malignancy.     BI-RADS Category:   Overall: 2 - Benign        Recommendation:  Routine screening mammogram in 1 year is recommended.     MEDICATIONS/ALLERGIES:     Current Outpatient Medications   Medication Sig    diclofenac (VOLTAREN) 50 MG EC tablet Take 1 tablet (50 mg total) by mouth 3 (three) times daily as needed (pain).    ergocalciferol (ERGOCALCIFEROL) 50,000 unit Cap Take 1 capsule (50,000 Units total) by mouth every 7 days.    gabapentin (NEURONTIN) 300 MG capsule Take 1 capsule (300 mg total) by mouth 3 (three) times daily.    multivitamin capsule Take 1 capsule by mouth once daily.    oxyCODONE (ROXICODONE) 5 MG immediate release tablet Take 1 tablet (5 mg total) by mouth every 4 (four) hours as needed for Pain.    venlafaxine (EFFEXOR) 37.5 MG Tab Take 1 tablet (37.5 mg total) by mouth once daily.     No current facility-administered medications for this visit.      Review of patient's allergies indicates:  No Known Allergies    PHYSICAL EXAM:   /84 (BP Location: Right arm, Patient Position: Sitting, BP Method: Medium (Automatic))   Pulse 73   Ht 5' 6" (1.676 " m)   Wt 81.6 kg (180 lb)   LMP 10/15/2018 (Within Weeks)   BMI 29.05 kg/m²     Physical Exam   Vitals reviewed.  Constitutional: She is oriented to person, place, and time.   HENT:   Head: Normocephalic and atraumatic.   Nose: Nose normal.   Eyes: Pupils are equal, round, and reactive to light. Right eye exhibits no discharge. Left eye exhibits no discharge.   Pulmonary/Chest: Effort normal and breath sounds normal. No stridor. No respiratory distress. She exhibits no mass, no tenderness and no edema. Right breast exhibits no inverted nipple, no mass, no nipple discharge, no skin change and no tenderness. Left breast exhibits no inverted nipple, no mass, no nipple discharge, no skin change and no tenderness. No breast swelling or bleeding. Breasts are symmetrical.       Abdominal: Normal appearance.   Genitourinary: No breast swelling or bleeding.   Neurological: She is alert and oriented to person, place, and time.   Skin: Skin is warm and dry.     Psychiatric: Her behavior is normal. Mood, judgment and thought content normal.       ASSESSMENT:   This is a 53 y.o. female without evidence of recurrence by exam, history or imaging.       PLAN:   1. Continue to follow up with Dr. Johnson in Medical Oncology.    2. Continue monthly self breast exams and call the clinic with any changes or problems.  3. Mammogram in 1 year .  4. Return to clinic in 1 year .    The patient is in agreement with the plan. Questions were encouraged and answered to patient's satisfaction. Teresa will call our office with any questions or concerns.

## 2024-07-29 ENCOUNTER — OFFICE VISIT (OUTPATIENT)
Dept: PSYCHIATRY | Facility: CLINIC | Age: 54
End: 2024-07-29
Payer: COMMERCIAL

## 2024-07-29 DIAGNOSIS — C50.912 INFILTRATING DUCTAL CARCINOMA OF LEFT BREAST: ICD-10-CM

## 2024-07-29 DIAGNOSIS — F43.23 ADJUSTMENT DISORDER WITH MIXED ANXIETY AND DEPRESSED MOOD: Primary | ICD-10-CM

## 2024-07-29 DIAGNOSIS — Z17.1 MALIGNANT NEOPLASM OF UPPER-INNER QUADRANT OF LEFT BREAST IN FEMALE, ESTROGEN RECEPTOR NEGATIVE: ICD-10-CM

## 2024-07-29 DIAGNOSIS — C50.212 MALIGNANT NEOPLASM OF UPPER-INNER QUADRANT OF LEFT BREAST IN FEMALE, ESTROGEN RECEPTOR NEGATIVE: ICD-10-CM

## 2024-07-29 PROCEDURE — 99999 PR PBB SHADOW E&M-EST. PATIENT-LVL II: CPT | Mod: PBBFAC,,, | Performed by: PSYCHOLOGIST

## 2024-07-29 PROCEDURE — 1160F RVW MEDS BY RX/DR IN RCRD: CPT | Mod: CPTII,S$GLB,, | Performed by: PSYCHOLOGIST

## 2024-07-29 PROCEDURE — 90791 PSYCH DIAGNOSTIC EVALUATION: CPT | Mod: S$GLB,,, | Performed by: PSYCHOLOGIST

## 2024-07-29 PROCEDURE — 1159F MED LIST DOCD IN RCRD: CPT | Mod: CPTII,S$GLB,, | Performed by: PSYCHOLOGIST

## 2024-08-12 ENCOUNTER — TELEPHONE (OUTPATIENT)
Dept: HEMATOLOGY/ONCOLOGY | Facility: CLINIC | Age: 54
End: 2024-08-12
Payer: COMMERCIAL

## 2024-08-12 NOTE — TELEPHONE ENCOUNTER
LVM in regards to confirming appt with Dr. Wali Figueroa for tomorrow 8/13 @ 5PM, virtual.      [place], [place] ext [phone]

## 2024-08-13 ENCOUNTER — PATIENT MESSAGE (OUTPATIENT)
Dept: PSYCHIATRY | Facility: CLINIC | Age: 54
End: 2024-08-13
Payer: COMMERCIAL

## 2024-08-14 ENCOUNTER — PATIENT MESSAGE (OUTPATIENT)
Dept: SURGERY | Facility: CLINIC | Age: 54
End: 2024-08-14
Payer: COMMERCIAL

## 2024-08-20 ENCOUNTER — OFFICE VISIT (OUTPATIENT)
Dept: PSYCHIATRY | Facility: CLINIC | Age: 54
End: 2024-08-20
Payer: COMMERCIAL

## 2024-08-20 DIAGNOSIS — F43.23 ADJUSTMENT DISORDER WITH MIXED ANXIETY AND DEPRESSED MOOD: Primary | ICD-10-CM

## 2024-08-20 PROCEDURE — 1159F MED LIST DOCD IN RCRD: CPT | Mod: CPTII,95,, | Performed by: PSYCHOLOGIST

## 2024-08-20 PROCEDURE — 1160F RVW MEDS BY RX/DR IN RCRD: CPT | Mod: CPTII,95,, | Performed by: PSYCHOLOGIST

## 2024-08-20 PROCEDURE — 90834 PSYTX W PT 45 MINUTES: CPT | Mod: 95,,, | Performed by: PSYCHOLOGIST

## 2024-08-20 NOTE — PROGRESS NOTES
Telemedicine PSYCHO-ONCOLOGY NOTE/ Individual Psychotherapy     Consultation started: 8/20/2024 at 5:01 PM   The chief complaint leading to consultation is: adaptation to disease and treatment, depression, anxiety, grief  The patient location is: Patient home in [place], [place] (Provider licensed in [place], [place], [place])  Virtual visit with synchronous audio and video   Patient alone at the time of consultation      Each patient provided medical services by telemedicine is:  (1) informed of the relationship between the physician and patient and the respective role of any other health care provider with respect to management of the patient; and (2) notified that he or she may decline to receive medical services by telemedicine and may withdraw from such care at any time.    Crisis Disclaimer: Patient was informed that due to the virtual nature of the visit, that if a crisis develops, protocols will be implemented to ensure patient safety, including but not limited to: 1) Initiating a welfare check with local Law Enforcement, 2) Calling [facility]/National Crisis Hotline, and/or 3) Initiating PEC/CEC procedures.     Date: 8/20/2024   Site of therapist:  [facility]         Therapeutic Intervention: Met with patient.   Outpatient - Supportive psychotherapy 45 min - CPT Code 91675    Referring provider: Leonard    Chief complaint/reason for encounter: grief, depression and anxiety   Met with patient to evaluate psychosocial adaptation to survivorship of breast cancer    Patient was last seen by me on 7/29/2024    Medical History:  Patient Active Problem List   Diagnosis    Encounter for colorectal cancer screening    Adjustment disorder with mixed anxiety and depressed mood    Overweight (BMI 25.0-29.9)    Need for hepatitis C screening test    Need for shingles vaccine    Bunion of great toe of left foot    Laceration of skin of forehead    Invasive ductal carcinoma of breast    Malignant neoplasm of upper-inner quadrant of left  breast in female, estrogen receptor negative       Objective:      Teresa Valdes arrived promptly for the session (by video).  Ms. Valdes was independently ambulatory at the time of session. The patient was fully cooperative throughout the session.  Appearance: age appropriate, casually  dressed, well groomed  Behavior/Cooperation: friendly and cooperative  Speech: normal in rate, volume, and tone and appropriate quality, quantity and organization of sentences  Mood: sad  Affect: profusely tearful  Thought Process: goal-directed, logical  Thought Content: normal,  No delusions or paranoia; did not appear to be responding to internal stimuli during the session  Orientation: grossly intact  Memory: Grossly intact  Attention Span/Concentration: Attends to session without distraction; reports no difficulty  Fund of Knowledge: average  Estimate of Intelligence: average from verbal skills and history  Cognition: grossly intact  Insight: patient has awareness of illness; good insight into own behavior and behavior of others  Judgment: the patient's behavior is adequate to circumstances    Current Outpatient Medications   Medication    diclofenac (VOLTAREN) 50 MG EC tablet    ergocalciferol (ERGOCALCIFEROL) 50,000 unit Cap    gabapentin (NEURONTIN) 300 MG capsule    multivitamin capsule    oxyCODONE (ROXICODONE) 5 MG immediate release tablet    venlafaxine (EFFEXOR) 37.5 MG Tab     No current facility-administered medications for this visit.       Interval history and content of current session: Patient discussed events and activities since the time of last visit. Her fiancee'  unexpectedly on Friday (DKA, NINI).  Patient had learned the day before that he was struggling with EtOH overuse/relapse. Patient discussed current coping and efforts to adjust. She is eating, sleeping adequately. Her family (and his family) have been very supportive. She has taken a few days off of work, but plans to go back on Thursday in  "order to "put some normalcy in place." The  is this weekend. Patient's elis is helping her cope ("I know he is in [place]"). SHe anticipates challenges settling his finances (since they are not ) and may have to make radical changes in the future (can't afford current house on her salary, alone). These events have strengthened her resolve to "make major changes" in her own health behaviors ("I want to be here for my kids and grandkids").      Risk parameters:   Patient reports no suicidal ideation  Patient reports no homicidal ideation  Patient reports no self-injurious behavior  Patient reports no violent behavior   Safety needs:  None at this time      Verbal deficits: None     Patient's response to intervention:The patient's response to intervention is accepting.     Progress toward goals and other mental status changes:  The patient's progress toward goals is  n/a due to life events .         Distress Thermometer:       2024     4:39 PM 2024     1:20 PM 2024     1:05 PM 2024    11:40 AM 2024     2:10 PM 3/26/2024    10:55 AM 3/12/2024    10:56 AM   DISTRESS SCREENING   Distress Score 9 5 8 0 - No Distress 4 1 0 - No Distress   Practical Concerns Finances Taking care of myself;Work;Finances   Treatment decisions     Social Concerns Relationship with spouse or partner Relationship with spouse or partner;Relationship with children;Relationship with family members        Emotional Concerns Sadness or depression;Grief or loss;Loneliness;Anger Worry or anxiety;Sadness or depression;Grief or loss;Changes in appearance Worry or anxiety;Sadness or depression       Spiritual or Cheondoism Concerns Death, dying, or afterlife Death, dying, or afterlife        Physical Concerns Sleep;Fatigue;Tobacco use;Substance use;Memory or concentration Fatigue;Tobacco use;Substance use;Memory or concentration;Loss or change of physical abilities        Other Problems My fivioleta'  suddenly on " Friday Aug. 16  anxiety about returning to work              PHQ-9=  Initial visit: 10    PHQ ANSWERS    Q1. Little interest or pleasure in doing things: More than half the days (08/20/24 1641)  Q2. Feeling down, depressed, or hopeless: More than half the days (08/20/24 1641)  Q3. Trouble falling or staying asleep, or sleeping too much: Several days (08/20/24 1641)  Q4. Feeling tired or having little energy: Several days (08/20/24 1641)  Q5. Poor appetite or overeating: Several days (08/20/24 1641)  Q6. Feeling bad about yourself - or that you are a failure or have let yourself or your family down: Several days (08/20/24 1641)  Q7. Trouble concentrating on things, such as reading the newspaper or watching television: Several days (08/20/24 1641)  Q8. Moving or speaking so slowly that other people could have noticed. Or the opposite - being so fidgety or restless that you have been moving around a lot more than usual: Several days (08/20/24 1641)  Q9.      PHQ8 Score : 10 (08/20/24 1641)  PHQ-9 Total Score: 10 (08/20/24 1641)        MICHELLE-7= Initial visit: 11         8/20/2024     4:40 PM   GAD7   1. Feeling nervous, anxious, or on edge? 2   2. Not being able to stop or control worrying? 1   3. Worrying too much about different things? 1   4. Trouble relaxing? 1   5. Being so restless that it is hard to sit still? 1   6. Becoming easily annoyed or irritable? 1   7. Feeling afraid as if something awful might happen? 2   8. If you checked off any problems, how difficult have these problems made it for you to do your work, take care of things at home, or get along with other people? 1   MICHELLE-7 Score 9         Client Strengths: verbal, intelligent, successful, good social support, good insight, commitment to wellness, strong elis, strong cultural traditions      Treatment Plan:individual psychotherapy, medication management by physician, and AA  Target symptoms: alcohol abuse, depression, adjustment, grief  Why chosen  therapy is appropriate versus another modality: relevant to diagnosis, patient responds to this modality, evidence based practice  Outcome monitoring methods: self-report, checklist/rating scale  Therapeutic intervention type: behavior modifying psychotherapy, supportive psychotherapy  Prognosis: Good      Behavioral goals:    Exercise:   Stress management: eat, sleep, defer other decisions until more stable   Social engagement:   Nutrition:   Smoking Cessation:   Therapy: future AA    Prior:  Back to gym  Less than 2 bottles wine/week  3 cigs/day or fewer  Discuss potential increase of Effexor (37.5 mg) dose with PCP    Return to clinic: 2 weeks     Length of Service (minutes direct face-to-face contact): 45      ICD-10-CM ICD-9-CM   1. Adjustment disorder with mixed anxiety and depressed mood  F43.23 309.28         Wali Figueroa, PhD  [place] License #[id number]  [place] License #[id number]  [place] License #[id number]

## 2024-08-27 DIAGNOSIS — F32.A ANXIETY AND DEPRESSION: ICD-10-CM

## 2024-08-27 DIAGNOSIS — F41.9 ANXIETY: ICD-10-CM

## 2024-08-27 DIAGNOSIS — F41.9 ANXIETY AND DEPRESSION: ICD-10-CM

## 2024-08-27 RX ORDER — VENLAFAXINE 37.5 MG/1
37.5 TABLET ORAL
Qty: 30 TABLET | Refills: 0 | Status: SHIPPED | OUTPATIENT
Start: 2024-08-27

## 2024-08-27 NOTE — TELEPHONE ENCOUNTER
Care Due:                  Date            Visit Type   Department     Provider  --------------------------------------------------------------------------------                                EP -                              PRIMARY      [facility]  Last Visit: 09-      CARE ([facility])   [facility]  [place]  Next Visit: None Scheduled  None         None Found                                                            Last  Test          Frequency    Reason                     Performed    Due Date  --------------------------------------------------------------------------------    Office Visit  12 months..  ergocalciferol...........  09-   09-    Vitamin D...  12 months..  ergocalciferol...........  04- 04-    [employer] Embedded Care Due Messages. Reference number: [id number].   8/27/2024 3:22:07 PM T

## 2024-09-10 ENCOUNTER — TELEPHONE (OUTPATIENT)
Dept: HEMATOLOGY/ONCOLOGY | Facility: CLINIC | Age: 54
End: 2024-09-10
Payer: COMMERCIAL

## 2024-09-12 ENCOUNTER — OFFICE VISIT (OUTPATIENT)
Dept: HEMATOLOGY/ONCOLOGY | Facility: CLINIC | Age: 54
End: 2024-09-12
Payer: COMMERCIAL

## 2024-09-12 DIAGNOSIS — F32.A ANXIETY AND DEPRESSION: ICD-10-CM

## 2024-09-12 DIAGNOSIS — F41.9 ANXIETY: ICD-10-CM

## 2024-09-12 DIAGNOSIS — G47.00 INSOMNIA, UNSPECIFIED TYPE: Primary | ICD-10-CM

## 2024-09-12 DIAGNOSIS — G62.9 NEUROPATHY: ICD-10-CM

## 2024-09-12 DIAGNOSIS — C50.212 MALIGNANT NEOPLASM OF UPPER-INNER QUADRANT OF LEFT BREAST IN FEMALE, ESTROGEN RECEPTOR NEGATIVE: ICD-10-CM

## 2024-09-12 DIAGNOSIS — F41.9 ANXIETY AND DEPRESSION: ICD-10-CM

## 2024-09-12 DIAGNOSIS — Z17.1 MALIGNANT NEOPLASM OF UPPER-INNER QUADRANT OF LEFT BREAST IN FEMALE, ESTROGEN RECEPTOR NEGATIVE: ICD-10-CM

## 2024-09-12 PROCEDURE — G2211 COMPLEX E/M VISIT ADD ON: HCPCS | Mod: 95,,, | Performed by: STUDENT IN AN ORGANIZED HEALTH CARE EDUCATION/TRAINING PROGRAM

## 2024-09-12 PROCEDURE — 99214 OFFICE O/P EST MOD 30 MIN: CPT | Mod: 95,,, | Performed by: STUDENT IN AN ORGANIZED HEALTH CARE EDUCATION/TRAINING PROGRAM

## 2024-09-12 RX ORDER — VENLAFAXINE 75 MG/1
75 TABLET ORAL DAILY
Qty: 90 TABLET | Refills: 3 | Status: SHIPPED | OUTPATIENT
Start: 2024-09-12 | End: 2025-09-12

## 2024-09-12 RX ORDER — TRAZODONE HYDROCHLORIDE 50 MG/1
50 TABLET ORAL NIGHTLY
Qty: 90 TABLET | Refills: 3 | Status: SHIPPED | OUTPATIENT
Start: 2024-09-12 | End: 2025-09-12

## 2024-09-12 NOTE — PROGRESS NOTES
The patient location is: [place]  The chief complaint leading to consultation is: breast cancer    Visit type: audiovisual    Each patient to whom he or she provides medical services by telemedicine is:  (1) informed of the relationship between the physician and patient and the respective role of any other health care provider with respect to management of the patient; and (2) notified that he or she may decline to receive medical services by telemedicine and may withdraw from such care at any time.          [facility]      Chief Complaint:   TNBC     Cancer Staging   Invasive ductal carcinoma of breast  Staging form: Breast, AJCC 8th Edition  - Clinical stage from 2023: Stage IB (cT1c, cN0, cM0, G3, ER-, UT-, HER2-) - Signed by Sonia Johnson MD on 2023    HPI:  Teresa Valdes is a 54 y.o. female who presents today for evaluation of newly diagnosed breast cancer. Her oncologic history is as follows:    -screening MMG 23 showing an asymmetry in the central L breast. Diagnostic MMG/US revealing a 19 x 12 x17mm complex cystic and solid mass seen in the L breast at 11oclock. No left axillary LAD   -23 biopsy confirming IDC, grade 3. ER negative, UT negative, Her2 2+, negative FISH. Ki67 80%  -23 MRI breast showed 1.6 x 1.0 x 1.2 cm left breast mass with no associated lymphadenopathy.  -2023 started na ddAC-T; completed end of 2024  -s/p L lumpectomy 3/6/24, final pathology demonstrating pCR, ypT0N0  -completed adjuvant radiation 6/3/24    Gyn History:   Menarche: 14  Menopause: 46    Age at first pregnancy: 18  : Yes, with second child      Interval History:  Teresa presents today for virtual visit follow up. She has had a difficult past month after the sudden loss of her fiancee. Has had difficulty sleeping in particular. She is eating and trying to take good care of herself otherwise. Notes that  neuropathy remains stable of gabapentin.         Patient Active Problem List   Diagnosis    Encounter for colorectal cancer screening    Adjustment disorder with mixed anxiety and depressed mood    Overweight (BMI 25.0-29.9)    Need for hepatitis C screening test    Need for shingles vaccine    Bunion of great toe of left foot    Laceration of skin of forehead    Invasive ductal carcinoma of breast    Malignant neoplasm of upper-inner quadrant of left breast in female, estrogen receptor negative       Current Outpatient Medications   Medication Instructions    diclofenac (VOLTAREN) 50 mg, Oral, 3 times daily PRN    ergocalciferol (ERGOCALCIFEROL) 50,000 Units, Oral, Every 7 days    gabapentin (NEURONTIN) 300 mg, Oral, 3 times daily    multivitamin capsule 1 capsule, Oral, Daily    oxyCODONE (ROXICODONE) 5 mg, Oral, Every 4 hours PRN    venlafaxine (EFFEXOR) 37.5 mg, Oral       Review of Systems:   Answers submitted by the patient for this visit:  Review of Systems Questionnaire (Submitted on 9/12/2024)  appetite change : No  unexpected weight change: No  mouth sores: No  visual disturbance: No  cough: No  shortness of breath: No  chest pain: No  abdominal pain: No  diarrhea: No  frequency: Yes  back pain: Yes  rash: No  headaches: No  adenopathy: No  nervous/ anxious: Yes      PHYSICAL EXAM:  ECOG 0   General: well appearing, in no apparent distress  HEENT: Normocephalic, EOMI, anicteric sclerae  Lungs: no increased wob  Skin: no rash  Neurologic: Alert and oriented x 4, normal speech  Psychiatric: Conversing appropriately with providers throughout today's encounter.      Reviewed all recent labs, imaging and pathology.    Assessment & Plan:  Teresa is a dimitry 53yo woman with recently diagnosed cT1cN0 TNBC.     Given disease <2cm, proceeded with naddAC-T which she completed 1/2024. She is now s/p L lumpectomy with final pathology confirming pCR. She completed adjuvant radiation in early June 2024 and will continue  close follow up.       #TNBC:  --will cont to monitor on surveillance following this (q4 mo for the first 5 years, then annually)  --recent MMG 7/16/24 w [facility]; repeat due 7/2025  --RTC 4mo    #Neuropathy: stable   --cont gabapentin 300mg in am and 600mg pm- feels like this has helped with neuropathy and restless leg symptoms      #Difficulty sleeping:   --will order trial of trazodone      #Depressed mood: expected in the setting of her recent loss  --following w Dr. Figueroa which she thinks has helped  --plan to increase effexor dose to get more mood benefit      All questions were answered to her apparent satisfaction. Will see her back in 4mo or sooner should the need arise.       Sonia Johnson MD       Route Chart for Scheduling    Med Onc Chart Routing      Follow up with physician 4 months.   Follow up with DARIANA    Infusion scheduling note    Injection scheduling note    Labs    Imaging    Pharmacy appointment    Other referrals                      MDM includes  :    - Acute or chronic illness or injury that poses a threat to life or bodily function  - Review of prior external notes from unique source  - Independent review and explanation of 3+ results from unique tests  - Discussion of management and ordering 3+ unique tests  - Extensive discussion of treatment and management  - Prescription drug management  - Drug therapy requiring intensive monitoring for toxicity

## 2024-09-23 ENCOUNTER — OFFICE VISIT (OUTPATIENT)
Dept: FAMILY MEDICINE | Facility: CLINIC | Age: 54
End: 2024-09-23
Payer: COMMERCIAL

## 2024-09-23 VITALS
RESPIRATION RATE: 16 BRPM | TEMPERATURE: 98 F | WEIGHT: 185.5 LBS | OXYGEN SATURATION: 99 % | HEIGHT: 66 IN | HEART RATE: 71 BPM | BODY MASS INDEX: 29.81 KG/M2

## 2024-09-23 DIAGNOSIS — Z00.00 ANNUAL PHYSICAL EXAM: Primary | ICD-10-CM

## 2024-09-23 DIAGNOSIS — Z23 NEED FOR SHINGLES VACCINE: ICD-10-CM

## 2024-09-23 DIAGNOSIS — Z17.1 MALIGNANT NEOPLASM OF UPPER-OUTER QUADRANT OF LEFT BREAST IN FEMALE, ESTROGEN RECEPTOR NEGATIVE: ICD-10-CM

## 2024-09-23 DIAGNOSIS — E55.9 VITAMIN D DEFICIENCY DISEASE: ICD-10-CM

## 2024-09-23 DIAGNOSIS — F17.210 CONTINUOUS DEPENDENCE ON CIGARETTE SMOKING: ICD-10-CM

## 2024-09-23 DIAGNOSIS — C50.412 MALIGNANT NEOPLASM OF UPPER-OUTER QUADRANT OF LEFT BREAST IN FEMALE, ESTROGEN RECEPTOR NEGATIVE: ICD-10-CM

## 2024-09-23 DIAGNOSIS — D75.89 MACROCYTOSIS: ICD-10-CM

## 2024-09-23 DIAGNOSIS — F41.1 GAD (GENERALIZED ANXIETY DISORDER): ICD-10-CM

## 2024-09-23 DIAGNOSIS — F32.5 MAJOR DEPRESSIVE DISORDER WITH SINGLE EPISODE, IN FULL REMISSION: ICD-10-CM

## 2024-09-23 PROCEDURE — 1159F MED LIST DOCD IN RCRD: CPT | Mod: CPTII,S$GLB,, | Performed by: STUDENT IN AN ORGANIZED HEALTH CARE EDUCATION/TRAINING PROGRAM

## 2024-09-23 PROCEDURE — 99396 PREV VISIT EST AGE 40-64: CPT | Mod: 25,S$GLB,, | Performed by: STUDENT IN AN ORGANIZED HEALTH CARE EDUCATION/TRAINING PROGRAM

## 2024-09-23 PROCEDURE — 99999 PR PBB SHADOW E&M-EST. PATIENT-LVL III: CPT | Mod: PBBFAC,,, | Performed by: STUDENT IN AN ORGANIZED HEALTH CARE EDUCATION/TRAINING PROGRAM

## 2024-09-23 PROCEDURE — 3008F BODY MASS INDEX DOCD: CPT | Mod: CPTII,S$GLB,, | Performed by: STUDENT IN AN ORGANIZED HEALTH CARE EDUCATION/TRAINING PROGRAM

## 2024-09-23 PROCEDURE — 1160F RVW MEDS BY RX/DR IN RCRD: CPT | Mod: CPTII,S$GLB,, | Performed by: STUDENT IN AN ORGANIZED HEALTH CARE EDUCATION/TRAINING PROGRAM

## 2024-09-23 RX ORDER — ZOSTER VACCINE RECOMBINANT, ADJUVANTED 50 MCG/0.5
KIT INTRAMUSCULAR
Qty: 1 EACH | Refills: 1 | Status: SHIPPED | OUTPATIENT
Start: 2024-09-23

## 2024-09-23 NOTE — PROGRESS NOTES
[facility] Note    Chief Complaint      No chief complaint on file.    History of Present Illness      Suture / Staple Removal        Teresa Valdes is a 54 y.o. female with Anxiety /MDD and cigarette smoking recently diagnosed with triple negative stage 1B invasive ductal carcinoma of the left breast  (7/24/2023) s/p lumpectomy with reconstruction/RT/chemotherapy who presents for annual physical . She recently lost her fiancee so quite devastated but has been seeing a therapist 2wice weekly and has good social support .. She has completed her CT for breast cancer and mammogram/US breast UTD as well.     Hem Oncologist : Dr Elizabeth Scott    Problem List Addressed This Visit:    As listed below    Health Maintenance   Topic Date Due    Shingles Vaccine (2 of 2) 11/13/2023    Colorectal Cancer Screening  04/11/2026    TETANUS VACCINE  02/05/2028    Lipid Panel  04/13/2028    Hepatitis C Screening  Completed       Past Medical History:   Diagnosis Date    Anxiety     Breast cancer     left breast IDC    Depression     Ovarian cancer     Sebaceous cyst 04/02/2018       Past Surgical History:   Procedure Laterality Date    ADENOIDECTOMY      BREAST BIOPSY      BREAST CYST EXCISION Right     BREAST LUMPECTOMY      BREAST RECONSTRUCTION      BREAST SURGERY      Abscess     CHOLECYSTECTOMY      COLONOSCOPY N/A 04/11/2023    Procedure: COLONOSCOPY;  Surgeon: Mona Valverde MD;  Location: [facility] ENDO;  Service: Endoscopy;  Laterality: N/A;    CYST REMOVAL      on back    cyst removed left wrist      INJECTION FOR SENTINEL NODE IDENTIFICATION Left 03/06/2024    Procedure: INJECTION, FOR SENTINEL NODE IDENTIFICATION;  Surgeon: Keke Sequeira MD;  Location[facility] OR;  Service: General;  Laterality: Left;    KNEE LIGAMENT RECONSTRUCTION Left     LUMPECTOMY,BREAST,WITH RADIOACTIVE SEED LOCALIZATION Left 03/06/2024    Procedure: LUMPECTOMY,BREAST,WITH RADIOACTIVE SEED LOCALIZATION / RADIOLOGICAL  MARKER;  Surgeon: Keke Sequeira MD;  Location: [facility];  Service: General;  Laterality: Left;  1.5 HOURS    PORTACATH PLACEMENT      right chest wall    REDUCTION OF BOTH BREASTS Bilateral 03/06/2024    Procedure: MAMMOPLASTY, REDUCTION, BILATERAL / ONCOPLASTIC REDUCTION;  Surgeon: Demond Patel DO;  Location: [facility];  Service: General;  Laterality: Bilateral;  3 HOURS ON PAPER    SENTINEL LYMPH NODE BIOPSY Left 03/06/2024    Procedure: BIOPSY, LYMPH NODE, SENTINEL;  Surgeon: Keke Sequeira MD;  Location: [facility];  Service: General;  Laterality: Left;    sleeve gastroectomy  06/2017    in [place]    TONSILLECTOMY      Uvuloplasty         family history includes Arthritis in her mother; Breast cancer in her paternal grandmother; Heart disease in her father; Hypertension in her father.    Social History     Tobacco Use    Smoking status: Every Day     Current packs/day: 0.50     Average packs/day: 0.5 packs/day for 7.5 years (3.8 ttl pk-yrs)     Types: Cigarettes     Start date: 3/7/2017    Smokeless tobacco: Never    Tobacco comments:     Working on quitting    Substance Use Topics    Alcohol use: Not Currently     Comment: 4 bottes/week    Drug use: Never       Review of Systems   Constitutional:  Negative for fatigue and fever.   Respiratory:  Negative for cough and chest tightness.    Gastrointestinal:  Negative for abdominal pain, diarrhea and vomiting.   Endocrine: Negative for polydipsia and polyphagia.   Genitourinary:  Negative for difficulty urinating, dysuria and frequency.   Musculoskeletal:  Negative for arthralgias, back pain, gait problem, joint swelling and neck pain.   Skin:  Negative for rash.   Neurological:  Negative for seizures, weakness, numbness and headaches.   Psychiatric/Behavioral:  Positive for dysphoric mood. Negative for sleep disturbance.        Outpatient Encounter Medications as of 9/23/2024   Medication Sig Dispense Refill    gabapentin (NEURONTIN) 300 MG capsule Take 1  "capsule (300 mg total) by mouth 3 (three) times daily. 270 capsule 3    multivitamin capsule Take 1 capsule by mouth once daily.      traZODone (DESYREL) 50 MG tablet Take 1 tablet (50 mg total) by mouth every evening. 90 tablet 3    venlafaxine (EFFEXOR) 75 MG tablet Take 1 tablet (75 mg total) by mouth once daily. 90 tablet 3    varicella-zoster gE-AS01B, PF, (SHINGRIX, PF,) 50 mcg/0.5 mL injection Inject 0.5mL IM at 0 and 2-6 months 1 each 1    [DISCONTINUED] diclofenac (VOLTAREN) 50 MG EC tablet Take 1 tablet (50 mg total) by mouth 3 (three) times daily as needed (pain). 30 tablet 1    [DISCONTINUED] ergocalciferol (ERGOCALCIFEROL) 50,000 unit Cap Take 1 capsule (50,000 Units total) by mouth every 7 days. 12 capsule 3    [DISCONTINUED] oxyCODONE (ROXICODONE) 5 MG immediate release tablet Take 1 tablet (5 mg total) by mouth every 4 (four) hours as needed for Pain. 10 tablet 0    [DISCONTINUED] venlafaxine (EFFEXOR) 37.5 MG Tab Take 1 tablet by mouth once daily 30 tablet 0     No facility-administered encounter medications on file as of 9/23/2024.        Review of patient's allergies indicates:  No Known Allergies    Physical Exam      Vital Signs  Temp: 97.7 °F (36.5 °C)  Temp Source: Temporal  Pulse: 71  Resp: 16  SpO2: 99 %  Pain Score:   4  Height and Weight  Height: 5' 6" (167.6 cm)  Weight: 84.1 kg (185 lb 8.3 oz)  BSA (Calculated - sq m): 1.98 sq meters  BMI (Calculated): 30  Weight in (lb) to have BMI = 25: 154.6]    Physical Exam  Vitals reviewed.   Constitutional:       Appearance: Normal appearance.   HENT:      Head: Normocephalic and atraumatic.      Right Ear: Tympanic membrane normal.      Left Ear: Tympanic membrane normal.      Mouth/Throat:      Mouth: Mucous membranes are moist.      Pharynx: Oropharynx is clear.   Eyes:      Extraocular Movements: Extraocular movements intact.      Conjunctiva/sclera: Conjunctivae normal.      Pupils: Pupils are equal, round, and reactive to light. " "  Cardiovascular:      Rate and Rhythm: Normal rate and regular rhythm.      Pulses: Normal pulses.      Heart sounds: Normal heart sounds.   Pulmonary:      Effort: Pulmonary effort is normal.      Breath sounds: Normal breath sounds.   Abdominal:      General: Abdomen is flat. Bowel sounds are normal.      Palpations: Abdomen is soft.   Musculoskeletal:         General: No deformity.      Cervical back: Normal range of motion.      Right lower leg: No edema.      Left lower leg: No edema.   Skin:     General: Skin is warm and dry.   Neurological:      General: No focal deficit present.      Mental Status: She is alert and oriented to person, place, and time.      Sensory: No sensory deficit.   Psychiatric:         Mood and Affect: Mood normal.         Behavior: Behavior normal.          Laboratory:  CBC:  No results for input(s): "WBC", "RBC", "HGB", "HCT", "PLT", "MCV", "MCH", "MCHC" in the last 2160 hours.  CMP:  No results for input(s): "GLU", "CALCIUM", "ALBUMIN", "PROT", "NA", "K", "CO2", "CL", "BUN", "ALKPHOS", "ALT", "AST", "BILITOT" in the last 2160 hours.    Invalid input(s): "CREATININ"  URINALYSIS:  No results for input(s): "COLORU", "CLARITYU", "SPECGRAV", "PHUR", "PROTEINUA", "GLUCOSEU", "BILIRUBINCON", "BLOODU", "WBCU", "RBCU", "BACTERIA", "MUCUS", "NITRITE", "LEUKOCYTESUR", "UROBILINOGEN", "HYALINECASTS" in the last 2160 hours.   LIPIDS:  No results for input(s): "TSH", "HDL", "CHOL", "TRIG", "LDLCALC", "CHOLHDL", "NONHDLCHOL", "TOTALCHOLEST" in the last 2160 hours.  TSH:  No results for input(s): "TSH" in the last 2160 hours.  A1C:  No results for input(s): "HGBA1C" in the last 2160 hours.    Radiology:      Assessment/Plan     Teresa Valdes is a 54 y.o.female with:    Annual physical  -preventive counseling provided  -UTD on  mammogram, colonoscopy and pap smear  -vaccines recommended below, will get shingles and flu from any local pharmacy of choice, declined COVID  - TSH; Future  - " Comprehensive Metabolic Panel; Future  - CBC Auto Differential; Future   -Lipid panel, A1C     2. Anxiety and depression  -uncontrolled today due to grief  -already established psychotherapy ( twice weekly)  -c/w current regimen    3. Vit D deficiency  -serum vit D @ 14  -c/w vit D supplement  -repeat level ordered    4. Stage 1B invasive ductal carcinoma of the left breast    -diagnosed (7/24/2023)   -triple negative  -followed by Hem/oncologist : Dr Elizabeth Scott    5. Macrocytosis  -serum Vit B12 and folate WNL  -H&H WNL  -monitor for now    6. Cigarette smoking  -smoking cessation advised        -Continue current medications and maintain follow up with specialists.      Patient verbalizes understanding and agrees with current treatment plan.      Dr Maryse Abreu MD  [facility]

## 2024-12-31 ENCOUNTER — OFFICE VISIT (OUTPATIENT)
Dept: PLASTIC SURGERY | Facility: CLINIC | Age: 54
End: 2024-12-31
Payer: COMMERCIAL

## 2024-12-31 VITALS
HEIGHT: 66 IN | DIASTOLIC BLOOD PRESSURE: 93 MMHG | HEART RATE: 85 BPM | BODY MASS INDEX: 28.93 KG/M2 | SYSTOLIC BLOOD PRESSURE: 129 MMHG | WEIGHT: 180 LBS

## 2024-12-31 DIAGNOSIS — C50.912 INFILTRATING DUCTAL CARCINOMA OF LEFT BREAST: Primary | ICD-10-CM

## 2024-12-31 PROCEDURE — 99999 PR PBB SHADOW E&M-EST. PATIENT-LVL III: CPT | Mod: PBBFAC,,, | Performed by: SURGERY

## 2024-12-31 PROCEDURE — 3080F DIAST BP >= 90 MM HG: CPT | Mod: CPTII,S$GLB,, | Performed by: SURGERY

## 2024-12-31 PROCEDURE — 99212 OFFICE O/P EST SF 10 MIN: CPT | Mod: S$GLB,,, | Performed by: SURGERY

## 2024-12-31 PROCEDURE — 3074F SYST BP LT 130 MM HG: CPT | Mod: CPTII,S$GLB,, | Performed by: SURGERY

## 2024-12-31 PROCEDURE — 3008F BODY MASS INDEX DOCD: CPT | Mod: CPTII,S$GLB,, | Performed by: SURGERY

## 2024-12-31 PROCEDURE — 1159F MED LIST DOCD IN RCRD: CPT | Mod: CPTII,S$GLB,, | Performed by: SURGERY

## 2024-12-31 NOTE — PROGRESS NOTES
Teresa Valdes presents [facility] for a follow up visit status post oncoplastic breast reduction on 3/6/24.  She completed adjuvant XRT in the summer  Of note her fiance  this fall  She notices a small palpable areas along left lateral IMF incision. And some thickened skin medailly  female denies fever, chills, nausea, vomiting or other systemic signs of infection.       ROS - negative, other than stated above    PHYSICAL EXAMINATION  Vitals:    24 1515   BP: (!) 129/93   Pulse: 85       Gen: NAD, appears stated age  Neuro: normal without focal findings, mental status and speech normal  HEENT: NCAT, neck supple, PEERL  CV: RRR  Pulm: Breathing non-labored, chest wall movement equal bilaterally   Breast:  well healed wise pattern incisions , sensation in tact, likel olive sized area of fat necrosis laterally and some sickened lymphedematous skin in the lower inner quadrant of that breast  Abdomen: soft, nontender, no guarding  Gu: genitalia not examined  Extremity:normal strength, no cyanosis or edema  Skin: Skin color, texture, turgor normal. No rashes or lesions  Psych: oriented to time, place and person, mood and affect are within normal limits      ASSESSMENT/PLAN  54 y.o. female s/p oncoplastic breast reduction    Discussed excision of fat necrosis if too uncomfortable  Edema in skin should continue to improve  Follow up as needed        Vargas Patel, DO  Plastic and Reconstructive Surgery      I spent a total of 10 minutes on the day of the visit.  This includes face to face time and non-face to face time preparing to see the patient (eg, review of tests), obtaining and/or reviewing separately obtained history, documenting clinical information in the electronic or other health record, independently interpreting results and communicating results to the patient/family/caregiver, or care coordinator.

## 2025-04-15 ENCOUNTER — TELEPHONE (OUTPATIENT)
Dept: HEMATOLOGY/ONCOLOGY | Facility: CLINIC | Age: 55
End: 2025-04-15
Payer: COMMERCIAL

## 2025-04-15 NOTE — TELEPHONE ENCOUNTER
----- Message from Calin sent at 4/15/2025  1:04 PM CDT -----  Regarding: Advice  Contact: [phone]  Who call ? Teresa Valdes What is the request Details : Pt calling to speak with someone in provider office regarding an missed call. States she cannot come in at 1 pm , can do virtual appt. Would like to know will virtual appt  be on today.   Please call pt back.    Can clinic  use patient portal  : No What number to call back : [phone]

## 2025-04-15 NOTE — TELEPHONE ENCOUNTER
----- Message from Calin sent at 4/15/2025  1:04 PM CDT -----  Regarding: Advice  Contact: [phone]  Who call ? Teresa Valdes What is the request Details : Pt calling to speak with someone in provider office regarding an missed call. States she cannot come in at 1 pm , can do virtual appt. Would like to know will virtual appt  be on today.   Please call pt back.    Can clinic  use patient portal  : No What number to call back : [phone]

## 2025-04-15 NOTE — TELEPHONE ENCOUNTER
Returned patients call. Patients appointment rescheduled. Date and time of new appointment confirmed.

## 2025-06-05 ENCOUNTER — OFFICE VISIT (OUTPATIENT)
Dept: FAMILY MEDICINE | Facility: CLINIC | Age: 55
End: 2025-06-05
Payer: COMMERCIAL

## 2025-06-05 VITALS
TEMPERATURE: 98 F | HEIGHT: 66 IN | SYSTOLIC BLOOD PRESSURE: 112 MMHG | DIASTOLIC BLOOD PRESSURE: 82 MMHG | BODY MASS INDEX: 29.05 KG/M2 | OXYGEN SATURATION: 97 % | HEART RATE: 77 BPM | WEIGHT: 180.75 LBS | RESPIRATION RATE: 20 BRPM

## 2025-06-05 DIAGNOSIS — M54.41 CHRONIC RIGHT-SIDED LOW BACK PAIN WITH RIGHT-SIDED SCIATICA: ICD-10-CM

## 2025-06-05 DIAGNOSIS — E66.3 OVERWEIGHT WITH BODY MASS INDEX (BMI) OF 29 TO 29.9 IN ADULT: ICD-10-CM

## 2025-06-05 DIAGNOSIS — C50.912 INFILTRATING DUCTAL CARCINOMA OF LEFT BREAST: ICD-10-CM

## 2025-06-05 DIAGNOSIS — M79.18 MYALGIA, OTHER SITE: ICD-10-CM

## 2025-06-05 DIAGNOSIS — M21.612 BUNION, LEFT FOOT: ICD-10-CM

## 2025-06-05 DIAGNOSIS — G89.29 CHRONIC RIGHT-SIDED LOW BACK PAIN WITH RIGHT-SIDED SCIATICA: ICD-10-CM

## 2025-06-05 DIAGNOSIS — Z00.00 ANNUAL PHYSICAL EXAM: Primary | ICD-10-CM

## 2025-06-05 PROCEDURE — 99999 PR PBB SHADOW E&M-EST. PATIENT-LVL IV: CPT | Mod: PBBFAC,,, | Performed by: STUDENT IN AN ORGANIZED HEALTH CARE EDUCATION/TRAINING PROGRAM

## 2025-06-05 RX ORDER — MELOXICAM 15 MG/1
15 TABLET ORAL DAILY
Qty: 30 TABLET | Refills: 0 | Status: SHIPPED | OUTPATIENT
Start: 2025-06-05 | End: 2025-07-05

## 2025-06-06 ENCOUNTER — RESULTS FOLLOW-UP (OUTPATIENT)
Dept: FAMILY MEDICINE | Facility: CLINIC | Age: 55
End: 2025-06-06

## 2025-06-06 DIAGNOSIS — E55.9 VITAMIN D DEFICIENCY: Primary | ICD-10-CM

## 2025-06-06 RX ORDER — ERGOCALCIFEROL 1.25 MG/1
50000 CAPSULE ORAL
Qty: 12 CAPSULE | Refills: 1 | Status: SHIPPED | OUTPATIENT
Start: 2025-06-06

## 2025-06-10 ENCOUNTER — OFFICE VISIT (OUTPATIENT)
Dept: HEMATOLOGY/ONCOLOGY | Facility: CLINIC | Age: 55
End: 2025-06-10
Payer: COMMERCIAL

## 2025-06-10 VITALS
HEIGHT: 66 IN | SYSTOLIC BLOOD PRESSURE: 144 MMHG | BODY MASS INDEX: 29.42 KG/M2 | HEART RATE: 80 BPM | WEIGHT: 183.06 LBS | DIASTOLIC BLOOD PRESSURE: 102 MMHG | OXYGEN SATURATION: 98 %

## 2025-06-10 DIAGNOSIS — D53.9 MACROCYTIC ANEMIA: Primary | ICD-10-CM

## 2025-06-10 DIAGNOSIS — C50.912 INFILTRATING DUCTAL CARCINOMA OF LEFT BREAST: ICD-10-CM

## 2025-06-10 PROCEDURE — 99214 OFFICE O/P EST MOD 30 MIN: CPT | Mod: S$GLB,,, | Performed by: STUDENT IN AN ORGANIZED HEALTH CARE EDUCATION/TRAINING PROGRAM

## 2025-06-10 PROCEDURE — 3080F DIAST BP >= 90 MM HG: CPT | Mod: CPTII,S$GLB,, | Performed by: STUDENT IN AN ORGANIZED HEALTH CARE EDUCATION/TRAINING PROGRAM

## 2025-06-10 PROCEDURE — 3044F HG A1C LEVEL LT 7.0%: CPT | Mod: CPTII,S$GLB,, | Performed by: STUDENT IN AN ORGANIZED HEALTH CARE EDUCATION/TRAINING PROGRAM

## 2025-06-10 PROCEDURE — 99999 PR PBB SHADOW E&M-EST. PATIENT-LVL III: CPT | Mod: PBBFAC,,, | Performed by: STUDENT IN AN ORGANIZED HEALTH CARE EDUCATION/TRAINING PROGRAM

## 2025-06-10 PROCEDURE — 3077F SYST BP >= 140 MM HG: CPT | Mod: CPTII,S$GLB,, | Performed by: STUDENT IN AN ORGANIZED HEALTH CARE EDUCATION/TRAINING PROGRAM

## 2025-06-10 PROCEDURE — 1159F MED LIST DOCD IN RCRD: CPT | Mod: CPTII,S$GLB,, | Performed by: STUDENT IN AN ORGANIZED HEALTH CARE EDUCATION/TRAINING PROGRAM

## 2025-06-10 PROCEDURE — G2211 COMPLEX E/M VISIT ADD ON: HCPCS | Mod: S$GLB,,, | Performed by: STUDENT IN AN ORGANIZED HEALTH CARE EDUCATION/TRAINING PROGRAM

## 2025-06-10 PROCEDURE — 3008F BODY MASS INDEX DOCD: CPT | Mod: CPTII,S$GLB,, | Performed by: STUDENT IN AN ORGANIZED HEALTH CARE EDUCATION/TRAINING PROGRAM

## 2025-06-10 NOTE — PROGRESS NOTES
[facility]      Chief Complaint:   TNBC     Cancer Staging   Invasive ductal carcinoma of breast  Staging form: Breast, AJCC 8th Edition  - Clinical stage from 2023: Stage IB (cT1c, cN0, cM0, G3, ER-, MI-, HER2-) - Signed by Sonia Johnson MD on 2023    HPI:  Teresa Valdes is a 54 y.o. female who presents today for evaluation of newly diagnosed breast cancer. Her oncologic history is as follows:    -screening MMG 23 showing an asymmetry in the central L breast. Diagnostic MMG/US revealing a 19 x 12 x17mm complex cystic and solid mass seen in the L breast at 11oclock. No left axillary LAD   -23 biopsy confirming IDC, grade 3. ER negative, MI negative, Her2 2+, negative FISH. Ki67 80%  -23 MRI breast showed 1.6 x 1.0 x 1.2 cm left breast mass with no associated lymphadenopathy.  -2023 started na ddAC-T; completed end of 2024  -s/p L lumpectomy 3/6/24, final pathology demonstrating pCR, [id number]  -completed adjuvant radiation 6/3/24    Gyn History:   Menarche: 14  Menopause: 46    Age at first pregnancy: 18  : Yes, with second child      Interval History: Presents to clinic for routine follow-up. She reports overall doing well but with some continued Left axilla pain and some radiation into her arm. The pain in her axilla is in the area of her known scar tissue and the pain in her left arm is radiating into the medial elbow and worse with extension. Otherwise, she is looking forward to her daughters wedding in October in [place]. She mentioned trying to get her port removed prior to her wedding.         Patient Active Problem List   Diagnosis    Encounter for colorectal cancer screening    Adjustment disorder with mixed anxiety and depressed mood    Overweight (BMI 25.0-29.9)    Need for hepatitis C screening test    Need for shingles vaccine    Bunion of great toe of left foot     Laceration of skin of forehead    Invasive ductal carcinoma of breast    Malignant neoplasm of upper-inner quadrant of left breast in female, estrogen receptor negative       Current Outpatient Medications   Medication Instructions    ergocalciferol (ERGOCALCIFEROL) 50,000 Units, Oral, Every 7 days    gabapentin (NEURONTIN) 300 mg, Oral, 3 times daily    meloxicam (MOBIC) 15 mg, Oral, Daily    multivitamin capsule 1 capsule, Daily    varicella-zoster gE-AS01B, PF, (SHINGRIX, PF,) 50 mcg/0.5 mL injection Intramuscular    venlafaxine (EFFEXOR) 75 mg, Oral, Daily       Review of Systems:   12pt ROS Negative except as noted above     PHYSICAL EXAM:  ECOG 0   General: well appearing, in no apparent distress  HEENT: Normocephalic, EOMI, anicteric sclerae  Lungs: no increased wob  Skin: no rash  Neurologic: Alert and oriented x 4, normal speech  Psychiatric: Conversing appropriately with providers throughout today's encounter.      Reviewed all recent labs, imaging and pathology.    Assessment & Plan:  Teresa is a dimitry 55yo woman with recently diagnosed cT1cN0 TNBC.     Given disease <2cm, proceeded with naddAC-T which she completed 1/2024. She is now s/p L lumpectomy with final pathology confirming pCR. She completed adjuvant radiation in early June 2024 and will continue close follow up.       #TNBC:  --will cont to monitor on surveillance following this (q4 mo for the first 5 years, then annually)  --recent MMG 7/16/24 w [facility]; repeat due 7/2025 as scheduled  --RTC 4mo in late October  --Orders placed for port removal  --Discussed options for her persistent pain being plastic surgery follow-up and referral to PT but wishes to re-assess at next visit    #Neuropathy: stable   --cont gabapentin 300mg in am and 600mg pm- feels like this has helped with neuropathy and restless leg symptoms    #Macrocytic Anemia  --B12, Folate, CBC, CMP at next visit    #Difficulty sleeping:   --trazodone PRN    #Depressed mood: Slowly  improving  --following w Dr. Figueroa which she thinks has helped  --continue effexor      All questions were answered to her apparent satisfaction. Will see her back in 4mo or sooner should the need arise.       Sonia Johnson MD       Route Chart for Scheduling    Med Onc Chart Routing      Follow up with physician 4 months. Last week in October, Dr. Johnson   Follow up with DARIANA    Infusion scheduling note    Injection scheduling note    Labs CBC, CMP, vitamin B12 and folate   Scheduling:  Preferred lab:  Lab interval:  CBC, CMP, B12, Folate prior to next visit   Imaging    Pharmacy appointment    Other referrals                    MDM includes  :    - Acute or chronic illness or injury that poses a threat to life or bodily function  - Review of prior external notes from unique source  - Independent review and explanation of 3+ results from unique tests  - Discussion of management and ordering 3+ unique tests  - Extensive discussion of treatment and management  - Prescription drug management  - Drug therapy requiring intensive monitoring for toxicity      I personally interviewed and examined this patient today with Dr. Burgess and agree with the assessment and plan set forth in his note.     Sonia Johnson MD

## 2025-06-11 DIAGNOSIS — C50.912 INFILTRATING DUCTAL CARCINOMA OF LEFT BREAST: Primary | ICD-10-CM

## 2025-06-12 ENCOUNTER — PATIENT MESSAGE (OUTPATIENT)
Dept: INTERVENTIONAL RADIOLOGY/VASCULAR | Facility: HOSPITAL | Age: 55
End: 2025-06-12
Payer: COMMERCIAL

## 2025-06-16 ENCOUNTER — TELEPHONE (OUTPATIENT)
Dept: INTERVENTIONAL RADIOLOGY/VASCULAR | Facility: HOSPITAL | Age: 55
End: 2025-06-16
Payer: COMMERCIAL

## 2025-06-16 NOTE — NURSING
Pre-procedure call complete.  2 patient identifier used (name and ).   Arrival time 12:30.      No food after midnight.  You may drink clear liquids (sports drinks, clear juices) until 2 hours before arrival time.  Clear liquids include only water, black coffee (no creamer), clear oral rehydration drinks, clear sports drinks and clear fruit juices.  Clear liquids do NOT include anything with pulp or food particles (chicken broth, ice cream, yogurt, jello, etc).  NO orange juice, pulpy juices, or apple cider.  If you can read newsprint through the liquid, it qualifies as clear.  IF UNSURE, drink water instead.      Have NOTHING BY MOUTH 2 hours before arrival time.  Medication the morning of your procedure may be taken with a sip of water.    Patient aware will need someone to provide transport home and monitor pt 8 hours post procedure.  No driving for at least 24 hours after procedure.   Patient reports she does not take blood pressure, heart medications, seizure and anti-rejection medications.   Do not take sleep medication (including OTC) the night before procedure.  Arrival time and location given.  Expected length of stay reviewed.  Covid screening completed.  Patient verbalized understanding of all pre-procedure instructions.  Written instructions and directions sent to patient in Mychart/portal.

## 2025-06-17 PROBLEM — R29.898 DECREASED STRENGTH OF TRUNK AND BACK: Status: ACTIVE | Noted: 2025-06-17

## 2025-06-17 PROBLEM — M25.69 DECREASED RANGE OF MOTION OF TRUNK AND BACK: Status: ACTIVE | Noted: 2025-06-17

## 2025-06-18 DIAGNOSIS — E55.9 VITAMIN D DEFICIENCY: ICD-10-CM

## 2025-06-18 RX ORDER — ERGOCALCIFEROL 1.25 MG/1
50000 CAPSULE ORAL
Qty: 12 CAPSULE | Refills: 1 | Status: CANCELLED | OUTPATIENT
Start: 2025-06-18

## 2025-06-18 NOTE — TELEPHONE ENCOUNTER
No care due was identified.  [facility] Embedded Care Due Messages. Reference number: [id number].   6/18/2025 4:02:21 PM CDT

## 2025-06-19 ENCOUNTER — HOSPITAL ENCOUNTER (OUTPATIENT)
Dept: INTERVENTIONAL RADIOLOGY/VASCULAR | Facility: HOSPITAL | Age: 55
Discharge: HOME | End: 2025-06-19
Attending: PHYSICIAN ASSISTANT
Payer: COMMERCIAL

## 2025-06-19 VITALS
OXYGEN SATURATION: 98 % | BODY MASS INDEX: 28.93 KG/M2 | SYSTOLIC BLOOD PRESSURE: 153 MMHG | WEIGHT: 180 LBS | RESPIRATION RATE: 18 BRPM | HEART RATE: 70 BPM | HEIGHT: 66 IN | DIASTOLIC BLOOD PRESSURE: 92 MMHG | TEMPERATURE: 98 F

## 2025-06-19 DIAGNOSIS — C50.919 INFILTRATING DUCT CARCINOMA: ICD-10-CM

## 2025-06-19 DIAGNOSIS — C50.912 INFILTRATING DUCTAL CARCINOMA OF LEFT BREAST: ICD-10-CM

## 2025-06-19 PROCEDURE — 63600175 PHARM REV CODE 636 W HCPCS: Performed by: STUDENT IN AN ORGANIZED HEALTH CARE EDUCATION/TRAINING PROGRAM

## 2025-06-19 PROCEDURE — 99900035 HC TECH TIME PER 15 MIN (STAT)

## 2025-06-19 PROCEDURE — 94761 N-INVAS EAR/PLS OXIMETRY MLT: CPT

## 2025-06-19 RX ORDER — MIDAZOLAM HYDROCHLORIDE 1 MG/ML
INJECTION, SOLUTION INTRAMUSCULAR; INTRAVENOUS
Status: COMPLETED | OUTPATIENT
Start: 2025-06-19 | End: 2025-06-19

## 2025-06-19 RX ORDER — LIDOCAINE HYDROCHLORIDE 20 MG/ML
INJECTION, SOLUTION INFILTRATION; PERINEURAL
Status: COMPLETED | OUTPATIENT
Start: 2025-06-19 | End: 2025-06-19

## 2025-06-19 RX ORDER — FENTANYL CITRATE 50 UG/ML
INJECTION, SOLUTION INTRAMUSCULAR; INTRAVENOUS
Status: COMPLETED | OUTPATIENT
Start: 2025-06-19 | End: 2025-06-19

## 2025-06-19 RX ADMIN — FENTANYL CITRATE 50 MCG: 50 INJECTION, SOLUTION INTRAMUSCULAR; INTRAVENOUS at 01:06

## 2025-06-19 RX ADMIN — MIDAZOLAM HYDROCHLORIDE 1 MG: 1 INJECTION, SOLUTION INTRAMUSCULAR; INTRAVENOUS at 01:06

## 2025-06-19 RX ADMIN — LIDOCAINE HYDROCHLORIDE 10 ML: 20 INJECTION, SOLUTION INFILTRATION; PERINEURAL at 01:06

## 2025-06-19 NOTE — DISCHARGE INSTRUCTIONS
Port Removal Discharge Instructions    You have had a port removed, and unless otherwise instructed no further IR follow up is needed and you should follow up with the doctor who ordered the port.     Special Instructions      Wound care: The dressing over your port site may be removed after 3-4  days. After removing the dressing, gently wash area daily with mild soap  and water. Pat dry. Cover with sterile dressing or band aid for 7-10 days  or until site is healed.   You can use a large pad-style bandage, or a 4x4 gauze dressing and tape to cover the site if your clothing (or bra straps) rubbing against it is uncomfortable to you. These items can be found at your local pharmacy or grocery/shopping center.       If I have skin adhesive how do I care for it?     You may have a type a skin adhesive (glue) instead of a bandage. Do not  bandage a wound treated with a skin adhesive. The skin adhesive works like a  bandage.   Do not use antibiotic ointment as it can break down the adhesive.   You can shower while the adhesive is on your skin (same day is ok), but do  not take a bath or soak or scrub the area for 14 days. Dry your skin by  patting it gently with a towel.   The adhesive will peel off on its own, usually in 5 to 10 days. o If it has been  10 days and you still have adhesive on you, you can use antibiotic ointment  or petroleum jelly to gently rub it off.       What activities can I perform?   If you have been given sedation no driving for 24 hours   Avoid lifting over 10 pounds for 2 days    Avoid heavy lifting or strenuous upper body activities for 5 days          Hand hygiene is VERY important! Anyone, including you, who touches the port site should do so with CLEAN hands.             Interventional Radiology Clinic   For complications   [phone]. Monday - Friday, 8:00 am - 4:00 pm    [phone] After hours and on holidays. Ask to speak with the interventional radiologist on call.     For  Scheduling   [phone] Monday - Friday, 8:00 am - 4:00 pm

## 2025-06-19 NOTE — PLAN OF CARE
Chart reviewed. Preop nursing care completed per orders. Safe surgery checklist complete. Pt denies any open wounds cuts or sores. Pt denies any metal in body or use of weight loss injections. Pt AAOX3, VSS on room air. Pt toileted, Bed locked in lowest position, Call light within reach. Pt denies any needs at this time. Belongings placed in locker #4.

## 2025-06-19 NOTE — H&P
Interventional Radiology Pre-Procedure History & Physical      Chief Complaint/Reason for Referral: h/o breast cancer    History of Present Illness:  Teresa Valdes is a 54 y.o. female who presents for port removal    Past Medical History:   Diagnosis Date    Anxiety     Breast cancer     left breast IDC    Depression     Ovarian cancer     Sebaceous cyst 04/02/2018     Past Surgical History:   Procedure Laterality Date    ADENOIDECTOMY      BREAST BIOPSY      BREAST CYST EXCISION Right     BREAST LUMPECTOMY      BREAST RECONSTRUCTION      BREAST SURGERY      Abscess     CHOLECYSTECTOMY      COLONOSCOPY N/A 04/11/2023    Procedure: COLONOSCOPY;  Surgeon: Mona Valverde MD;  Location: [facility];  Service: Endoscopy;  Laterality: N/A;    CYST REMOVAL      on back    cyst removed left wrist      INJECTION FOR SENTINEL NODE IDENTIFICATION Left 03/06/2024    Procedure: INJECTION, FOR SENTINEL NODE IDENTIFICATION;  Surgeon: Keke Sequeira MD;  Location: [facility];  Service: General;  Laterality: Left;    KNEE LIGAMENT RECONSTRUCTION Left     LUMPECTOMY,BREAST,WITH RADIOACTIVE SEED LOCALIZATION Left 03/06/2024    Procedure: LUMPECTOMY,BREAST,WITH RADIOACTIVE SEED LOCALIZATION / RADIOLOGICAL MARKER;  Surgeon: Keke Sequeira MD;  Location: [facility];  Service: General;  Laterality: Left;  1.5 HOURS    PORTACATH PLACEMENT      right chest wall    REDUCTION OF BOTH BREASTS Bilateral 03/06/2024    Procedure: MAMMOPLASTY, REDUCTION, BILATERAL / ONCOPLASTIC REDUCTION;  Surgeon: Demond Patel DO;  Location: [facility];  Service: General;  Laterality: Bilateral;  3 HOURS ON PAPER    SENTINEL LYMPH NODE BIOPSY Left 03/06/2024    Procedure: BIOPSY, LYMPH NODE, SENTINEL;  Surgeon: Keke Sequeira MD;  Location: [facility];  Service: General;  Laterality: Left;    sleeve gastroectomy  06/2017    in [place]    TONSILLECTOMY      Uvuloplasty         Allergies:   Review of patient's allergies indicates:  No Known Allergies      Home Meds:   Prior to Admission medications    Medication Sig Start Date End Date Taking? Authorizing Provider   gabapentin (NEURONTIN) 300 MG capsule Take 1 capsule (300 mg total) by mouth 3 (three) times daily. 5/1/24 6/19/25 Yes Sonia Johnson MD   multivitamin capsule Take 1 capsule by mouth once daily.   Yes Provider, Historical   venlafaxine (EFFEXOR) 75 MG tablet Take 1 tablet (75 mg total) by mouth once daily. 9/12/24 9/12/25 Yes Sonia Johnson MD   ergocalciferol (ERGOCALCIFEROL) 50,000 unit Cap Take 1 capsule (50,000 Units total) by mouth every 7 days. 6/6/25   Maryse Abrue MD   meloxicam (MOBIC) 15 MG tablet Take 1 tablet (15 mg total) by mouth once daily. 6/5/25 7/5/25  Maryse Abreu MD   varicella-zoster gE-AS01B, PF, (SHINGRIX, PF,) 50 mcg/0.5 mL injection Inject into the muscle. 6/5/25   Cynthia Lopez, PharmD       Anticoagulation/Antiplatelet Meds: as above    Review of Systems:   Hematological: no known coagulopathies  Respiratory: no shortness of breath  Cardiovascular: no chest pain  Gastrointestinal: no abdominal pain  Genitourinary: no dysuria  Musculoskeletal: negative  Neurological: no TIA or stroke symptoms     Physical Exam:  Temp: 98.2 °F (36.8 °C) (06/19/25 1303)  Pulse: 66 (06/19/25 1303)  Resp: 17 (06/19/25 1303)  BP: (!) 152/97 (06/19/25 1303)  SpO2: 99 % (06/19/25 1303)    General: WNWD, NAD  HEENT: Normocephalic, sclera anicteric, oropharynx clear  Neck: Supple, no palpable lymphadenopathy  Heart: RRR  Lungs:  breathing unlabored  Abd: NTND, soft  Extremities:  no CCE on exposed skin  Neuro: Gross nonfocal    Laboratory:  Lab Results   Component Value Date    INR 1.0 08/15/2023       Lab Results   Component Value Date    WBC 3.09 (L) 06/05/2025    HGB 15.1 06/05/2025    HCT 42.0 06/05/2025     (H) 06/05/2025     06/05/2025      Lab Results   Component Value Date    GLU 76 06/05/2025     06/05/2025    K 3.6 06/05/2025      06/05/2025    CO2 24 06/05/2025    BUN 10 06/05/2025    CREATININE 0.7 06/05/2025    CALCIUM 9.5 06/05/2025    MG 2.1 02/07/2018    ALT 15 06/05/2025    AST 27 06/05/2025    ALBUMIN 3.8 06/05/2025    BILITOT 0.4 06/05/2025       Imaging:  Catheter placement was reviewed.    Assessment/Plan:  54 y.o. female with h/o breast cancer. Will undergo port placement today.    Sedation:  Sedation history: have not been any systemic reactions  ASA: 2 / Mallampati: 3  Sedation plan: Up to moderate (Versed, fentanyl)     Risks (including, but not limited to, pain, bleeding, infection, damage to nearby structures, treatment failure/recurrence, and the need for additional procedures), potential benefits, and alternatives were discussed with the patient. All questions were answered to the best of my abilities. The patient wishes to proceed. Written informed consent was obtained.      Tricia Yu MD

## 2025-06-19 NOTE — Clinical Note
Right: Chest.   Scrubbed with ChloroPrep With Tint.   Painted with Chlorohexidine/Alcohol.  Hair: N/A.  Skin prep dry before draping.  Prepped by: Elina Dye 6/19/2025 1:20 PM.

## 2025-06-19 NOTE — DISCHARGE SUMMARY
Radiology Discharge Summary      Hospital Course: No complications    Admit Date: 6/19/2025  Discharge Date: 06/19/2025     Instructions Given to Patient: Yes  Diet: Resume prior diet  Activity: no heavy lifting, pushing, pulling with the implant side for 5 days    Description of Condition on Discharge: Stable  Vital Signs (Most Recent): Temp: 98.2 °F (36.8 °C) (06/19/25 1303)  Pulse: 63 (06/19/25 1333)  Resp: 15 (06/19/25 1333)  BP: (!) 141/93 (06/19/25 1333)  SpO2: 96 % (06/19/25 1333)    Discharge Disposition: Home    Discharge Diagnosis: h/o breast cancer    Follow up: As scheduled    Tricia Yu MD  Interventional Radiology

## 2025-06-19 NOTE — BRIEF OP NOTE
Radiology Post-Procedure Note    Pre Op Diagnosis: h/o breast cancer    Post Op Diagnosis: same    Procedure: port removal     Procedure performed by: Tricia Yu MD    Written Informed Consent Obtained: Yes    Specimen Removed: YES - port    Estimated Blood Loss: Minimal    Findings:   Right IJ port removal.     Patient tolerated procedure well.    Tricia Yu MD  Interventional Radiology

## 2025-06-20 DIAGNOSIS — M79.672 FOOT PAIN, LEFT: Primary | ICD-10-CM

## 2025-07-03 ENCOUNTER — TELEPHONE (OUTPATIENT)
Dept: PODIATRY | Facility: CLINIC | Age: 55
End: 2025-07-03
Payer: COMMERCIAL

## 2025-07-03 NOTE — TELEPHONE ENCOUNTER
Returned pt's call and explained that although she had her foot xray, she still has to have her ankle xray completed prior to her appt. Pt gave verbal understanding.          Copied from CRM #[id number]. Topic: General Inquiry - Return Call  >> Jul 3, 2025  1:10 PM Magaly wrote:  Type:  Patient Returning Call    Who Called: Pt   Who Left Message for Patient: Soledad   Does the patient know what this is regarding?: returning missed call. Pt states she had xray done on 06/23, would like to know if she needs additional xrays   Would the patient rather a call back or a response via [facility]? Call   Best Call Back Number: [phone]   Additional Information:

## 2025-07-07 ENCOUNTER — OFFICE VISIT (OUTPATIENT)
Dept: PODIATRY | Facility: CLINIC | Age: 55
End: 2025-07-07
Payer: COMMERCIAL

## 2025-07-07 VITALS — WEIGHT: 185.5 LBS | BODY MASS INDEX: 29.94 KG/M2

## 2025-07-07 DIAGNOSIS — M25.375 INSTABILITY OF LEFT FOOT JOINT: ICD-10-CM

## 2025-07-07 DIAGNOSIS — M21.612 BUNION, LEFT FOOT: Primary | ICD-10-CM

## 2025-07-07 PROCEDURE — 99999 PR PBB SHADOW E&M-EST. PATIENT-LVL III: CPT | Mod: PBBFAC,,, | Performed by: PODIATRIST

## 2025-07-07 RX ORDER — DEXAMETHASONE SODIUM PHOSPHATE 4 MG/ML
4 INJECTION, SOLUTION INTRA-ARTICULAR; INTRALESIONAL; INTRAMUSCULAR; INTRAVENOUS; SOFT TISSUE
Status: DISCONTINUED | OUTPATIENT
Start: 2025-07-07 | End: 2025-07-07 | Stop reason: HOSPADM

## 2025-07-07 RX ADMIN — DEXAMETHASONE SODIUM PHOSPHATE 4 MG: 4 INJECTION, SOLUTION INTRA-ARTICULAR; INTRALESIONAL; INTRAMUSCULAR; INTRAVENOUS; SOFT TISSUE at 02:07

## 2025-07-07 NOTE — PROGRESS NOTES
[facility] - PODIATRY  [address]  [address]  [place] [address]  Dept: [phone]  Dept Fax: [phone]    Alexei Schwartz Jr., DPM     Assessment:   MDM    Coding  1. Bunion, left foot  Ambulatory referral/consult to Podiatry    Small Joint Aspiration/Injection      2. Instability of left foot joint            Plan:     Small Joint Aspiration/Injection: L great MTP    Date/Time: 7/7/2025 2:45 PM    Performed by: Alexei Schwartz Jr., DPM  Authorized by: Alexei Schwartz Jr., DPM    Indications:  Joint swelling and pain  Site marked: the procedure site was marked    Timeout: prior to procedure the correct patient, procedure, and site was verified    Prep: patient was prepped and draped in usual sterile fashion      Local anesthesia used?: Yes    Anesthesia:  Local infiltration  Local anesthetic:  Bupivacaine 0.5% without epinephrine and co-phenylcaine spray  Anesthetic total (ml):  2    Location:  Great toe  Site:  L great MTP  Ultrasonic guidance for needle placement?: No    Needle size:  25 G  Approach:  Dorsal  Medications:  4 mg dexAMETHasone 4 mg/mL  Patient tolerance:  Patient tolerated the procedure well with no immediate complications      Teresa was seen today for bunions.    Diagnoses and all orders for this visit:    Bunion, left foot  -     Ambulatory referral/consult to Podiatry  -     Small Joint Aspiration/Injection    Instability of left foot joint        -pt seen, evaluated, and managed  -dx discussed in detail. All questions/concerns addressed  -all tx options discussed. All alternatives, risks, benefits of all txs discussed  -We discussed conservative care options possible including but not limited to shoe wear and/or padding, bracing/strapping, at home ROM, formal PT, medical therapy, injection therapy  - The utilization of NSAIDs can be considered but their benefit has to be tempered against the risk of GI/ concerns  - A steroid  injection can be undertaken.  We did discuss the potential mechanism of action of this shot.  Understanding that multiple injections at the same anatomic site do have deleterious effects on the soft tissue.  Generic risks include: steroid flare (advised to ice if necessary), skin hypo-pgimentation (which can be permanent and unsightly), elevation of blood sugar, subcutaneous atrophy (can be permanent) and infection.   -XR/imaging reviewed by me: agree with read  -labs reviewed by me: ok for rx nsaid  -implemented icing/stretching regimen  -offloading pads dispensed    -rxs dispensed: none  -referrals: none  -WB: wbat      Follow up in about 6 weeks (around 8/18/2025).    Subjective:      Patient ID: Teresa Valdes is a 54 y.o. female.    Chief Complaint:   Chief Complaint   Patient presents with    Bunions     Left foot       Patient complains of left bunions. Symptoms began several years ago. Notes progressive deformity of the left great toe.  Complains of associated pain.  Severity = moderate, fairly severe  Notes excessive wear on shoes.  Symptoms having impact on normal day to day activities. Patient's symptoms have gradually worsened. Treatment thus far has included trial of new shoes, which have been not very effective.  OTC analgesics, which have been ineffective.. Aggravating factors: walking and certain shoegear. Evaluation to date: none. Patients rates pain 7/10 on pain scale.      HPI    Last Podiatry Enc: Visit date not found  Last Enc w/ Me: Visit date not found    Outside reports reviewed: historical medical records.  Family hx: as below  Past Medical History:   Diagnosis Date    Anxiety     Breast cancer     left breast IDC    Depression     Ovarian cancer     Sebaceous cyst 04/02/2018     Past Surgical History:   Procedure Laterality Date    ADENOIDECTOMY      BREAST BIOPSY      BREAST CYST EXCISION Right     BREAST LUMPECTOMY      BREAST RECONSTRUCTION      BREAST SURGERY      Abscess      CHOLECYSTECTOMY      COLONOSCOPY N/A 04/11/2023    Procedure: COLONOSCOPY;  Surgeon: Mona Valverde MD;  Location: [facility];  Service: Endoscopy;  Laterality: N/A;    CYST REMOVAL      on back    cyst removed left wrist      INJECTION FOR SENTINEL NODE IDENTIFICATION Left 03/06/2024    Procedure: INJECTION, FOR SENTINEL NODE IDENTIFICATION;  Surgeon: Keke Sequeira MD;  Location: [facility] OR;  Service: General;  Laterality: Left;    KNEE LIGAMENT RECONSTRUCTION Left     LUMPECTOMY,BREAST,WITH RADIOACTIVE SEED LOCALIZATION Left 03/06/2024    Procedure: LUMPECTOMY,BREAST,WITH RADIOACTIVE SEED LOCALIZATION / RADIOLOGICAL MARKER;  Surgeon: Keke Sequeira MD;  Location: [facility] OR;  Service: General;  Laterality: Left;  1.5 HOURS    PORTACATH PLACEMENT      right chest wall    REDUCTION OF BOTH BREASTS Bilateral 03/06/2024    Procedure: MAMMOPLASTY, REDUCTION, BILATERAL / ONCOPLASTIC REDUCTION;  Surgeon: Demond Patel DO;  Location: [facility] OR;  Service: General;  Laterality: Bilateral;  3 HOURS ON PAPER    SENTINEL LYMPH NODE BIOPSY Left 03/06/2024    Procedure: BIOPSY, LYMPH NODE, SENTINEL;  Surgeon: Keke Sequeira MD;  Location: [facility];  Service: General;  Laterality: Left;    sleeve gastroectomy  06/2017    in [place]    TONSILLECTOMY      Uvuloplasty       Family History   Problem Relation Name Age of Onset    Arthritis Mother      Hypertension Father      Heart disease Father      Breast cancer Paternal Grandmother      Cancer Neg Hx       Current Medications[1]  Review of patient's allergies indicates:  No Known Allergies  Social History[2]    ROS    REVIEW OF SYSTEMS: Negative as documented below as well as positive findings in bold.       Constitutional  Respiratory  Gastrointestinal  Skin   - Fever - Cough - Heartburn - Rash   - Chills - Spit blood - Nausea - Itching   - Weight Loss - Shortness of breath - Vomiting - Nail pain   - Malaise/Fatigue - Wheezing - Abdominal Pain  Wound/Ulcer   - Weight Gain    - Blood in Stool  Poor wound healing       - Diarrhea          Cardiovascular  Genitourinary  Neurological  HEENT   - Chest Pain - Dysuria - Burning Sensation of feet - Headache   - Palpitations - Hematuria - Tingling / Paresthesia - Congestion   - Pain at night in legs - Flank Pain - Dizziness - Sore Throat   - Cramping   - Tremor - Blurred Vision   - Leg Swelling   - Sensory Change - Double Vision   - Dizzy when standing   - Speech Change - Eye Redness       - Focal Weakness - Dry Eyes       - Loss of Consciousness          Endocrine  Musculoskeletal  Psychiatric   - Cold intolerance - Muscle Pain - Depression   - Heat intolerance - Neck Pain - Insomnia   - Anemia - Joint Pain - Memory Loss   -  Easy bruising, bleeding - Heel pain - Anxiety      Toe Pain        Leg/Ankle/Foot Pain         Objective:     Wt 84.1 kg (185 lb 8.3 oz)   LMP 10/15/2018 (Within Weeks)   BMI 29.94 kg/m²   Vitals:    07/07/25 1446   Weight: 84.1 kg (185 lb 8.3 oz)   PainSc:   4       Physical Exam    General Appearance:   Patient appears well developed, well nourished  Patient appears stated age    Psychiatric:   Patient is oriented to time, place, and person.  Patient has appropriate mood and affect    Neck:  Trachea Midline  No visible masses    Respiratory/Ears:  No distress or labored breathing.  Able to differentiate between normal talking voice and whisper.  Able to follow commands    Eyes:  Visual Acuity intact  Lids and conjunctivae normal. No discoloration noted.    Foot Exam  Physical Exam  Ortho Exam  Ortho/SPM Exam  Physical Exam  Neurological Exam    L LE exam con't:  V:  DP 2/4, PT 2/4   CRT< 3s to all digits tested   Tibial and popliteal lymph nodes are w/o abnormality    Edema: absent, varicosities: absent    N:  Patient displays normal ankle reflexes   SILT in SP/DP/T/Analisa/Saph distributions    Ortho: +Motor EHL/FHL/TA/GA   observed lateral deviation of the hallux with bunion deformity present L    Enlarged bony  prominence present at the medial aspect of the 1st metatarsal head   +midfoot instability noted   There is mild decreased ROM at the first MTP joints with mild pain and no crepitus.   There is moderate pain with palpation of medial bump of the L 1st MPJ  Compartments soft/compressible. No pain on passive stretch of big toe. No calf  Pain.    Derm:  skin intact, skin warm and dry, skin without ulcers or lesions, skin without induration, nails normal, no erythema and no ecchymosis    Imaging / Labs:      X-Ray Foot Complete Left  Result Date: 6/23/2025  EXAMINATION: XR FOOT COMPLETE 3 VIEW LEFT CLINICAL HISTORY: .  Pain in left foot TECHNIQUE: AP, lateral and oblique views of the left foot were performed. COMPARISON: None FINDINGS: No fracture or dislocation.  Lisfranc articulation is congruent. Hallux valgus with bony bunion formation median eminence 1st  metatarsal head.   Cartilage spaces are maintained.  Soft tissues are unremarkable.     As above. Electronically signed by: Khurram Dolan MD Date:    06/23/2025 Time:    14:29      Note: This was dictated using a computer transcription program. Although proofread, it may contain computer transcription errors and phonetic errors. Other human proofreading errors may also exist. Corrections may be performed at a later time. Please contact us for any clarification if needed.    Alexei Schwartz DPM  [facility]         [1]   Current Outpatient Medications   Medication Sig Dispense Refill    venlafaxine (EFFEXOR) 75 MG tablet Take 1 tablet (75 mg total) by mouth once daily. 90 tablet 3    ergocalciferol (ERGOCALCIFEROL) 50,000 unit Cap Take 1 capsule (50,000 Units total) by mouth every 7 days. (Patient not taking: Reported on 7/7/2025) 12 capsule 1    gabapentin (NEURONTIN) 300 MG capsule Take 1 capsule (300 mg total) by mouth 3 (three) times daily. 270 capsule 3    multivitamin capsule Take 1 capsule by mouth once daily. (Patient not  taking: Reported on 7/7/2025)      varicella-zoster gE-AS01B, PF, (SHINGRIX, PF,) 50 mcg/0.5 mL injection Inject into the muscle. (Patient not taking: Reported on 7/7/2025) 1 each 1     No current facility-administered medications for this visit.   [2]   Social History  Socioeconomic History    Marital status:     Number of children: 2   Occupational History     Employer: [employer]   Tobacco Use    Smoking status: Every Day     Current packs/day: 0.50     Average packs/day: 0.5 packs/day for 8.3 years (4.2 ttl pk-yrs)     Types: Cigarettes     Start date: 3/7/2017    Smokeless tobacco: Never    Tobacco comments:     Working on quitting    Substance and Sexual Activity    Alcohol use: Not Currently     Comment: 4 bottes/week    Drug use: Never    Sexual activity: Yes     Partners: Male     Birth control/protection: Partner-Vasectomy     Comment:    Social History Narrative    Middle school      Lives with partner (Faraz) and older son      Social Drivers of Health     Financial Resource Strain: Medium Risk (6/4/2025)    Overall Financial Resource Strain (CARDIA)     Difficulty of Paying Living Expenses: Somewhat hard   Food Insecurity: Food Insecurity Present (6/4/2025)    Hunger Vital Sign     Worried About Running Out of Food in the Last Year: Sometimes true     Ran Out of Food in the Last Year: Never true   Transportation Needs: No Transportation Needs (6/4/2025)    PRAPARE - Transportation     Lack of Transportation (Medical): No     Lack of Transportation (Non-Medical): No   Physical Activity: Insufficiently Active (6/4/2025)    Exercise Vital Sign     Days of Exercise per Week: 2 days     Minutes of Exercise per Session: 30 min   Stress: Stress Concern Present (6/4/2025)    [employer] - Occupational Stress Questionnaire     Feeling of Stress : To some extent   Housing Stability: Low Risk  (6/4/2025)    Housing Stability Vital Sign      Unable to Pay for Housing in the Last Year: No     Number of Times Moved in the Last Year: 1     Homeless in the Last Year: No

## 2025-07-07 NOTE — PATIENT INSTRUCTIONS
Bunions  Even though bunions are a common foot deformity, there are misconceptions about them. Many people may unnecessarily suffer the pain of bunions for years before seeking treatment.    What Is a Bunion?          A bunion (also referred to as hallux valgus) is often described as a bump on the side of the big toe. But a bunion is more than that. The visible bump actually reflects changes in the bony framework of the front part of the foot. The big toe leans toward the second toe, rather than pointing straight ahead. This throws the bones out of alignment--producing the bunions bump.        Bunions are a progressive disorder. They begin with a leaning of the big toe, gradually changing the angle of the bones over the years and slowly producing the characteristic bump, which becomes increasingly prominent. Symptoms usually appear at later stages, although some people never have symptoms.    Causes  Bunions are most often caused by an inherited faulty mechanical structure of the foot. It is not the bunion itself that is inherited but certain foot types that make a person prone to developing a bunion.    Although wearing shoes that crowd the toes will not actually cause bunions, it sometimes makes the deformity get progressively worse. Symptoms may therefore appear sooner.    Symptoms  Symptoms, which occur at the site of the bunion, may include:    Pain or soreness  Inflammation and redness  A burning sensation  Possible numbness    Symptoms occur most often when wearing shoes that crowd the toes, such as shoes with a tight toe box or high heels. This may explain why women are more likely to have symptoms than men. In addition, spending long periods of time on your feet can aggravate the symptoms of bunions.    Diagnosis  Diagram indicating location of bunion on a footBunions are readily apparent--the prominence is visible at the base of the big toe or side of the foot. However, to fully evaluate the condition,  the foot and ankle surgeon may take x-rays to determine the degree of the deformity and assess the changes that have occurred.    Because bunions are progressive, they do not go away and will usually get worse over time. But not all cases are alike--some bunions progress more rapidly than others. Once your surgeon has evaluated your bunion, a treatment plan can be developed that is suited to your needs.    Nonsurgical Treatment  Sometimes observation of the bunion is all that is needed. To reduce the chance of damage to the joint, periodic evaluation and x-rays by your surgeon are advised.    In many other cases, however, some type of treatment is needed. Early treatments are aimed at easing the pain of bunions, but they will not reverse the deformity itself. These include:    Changes in shoewear. Wearing the right kind of shoes is very important. Choose shoes that have a wide toe box and forgo those with pointed toes or high heels, which may aggravate the condition.  Padding. Pads placed over the area of the bunion can help minimize pain. These can be obtained from your surgeon or purchased at a drug store.  Activity modifications. Avoid activity that causes bunion pain, including standing for long periods of time.  Medications. Oral nonsteroidal anti-inflammatory drugs (NSAIDs), such as ibuprofen, may be recommended to reduce pain and inflammation.  Icing. Applying an ice pack several times a day helps reduce inflammation and pain.  Injection therapy. Although rarely used in bunion treatment, injections of corticosteroids may be useful in treating the inflamed bursa (fluid-filled sac located around a joint) sometimes seen with bunions.  Orthotic devices. In some cases, custom orthotic devices may be provided by the foot and ankle surgeon.      Recommended OTC orthotics:  -powerstep  -superfeet    Recommended shoegear:  -new balance  -ascics  -mizuno  -craig       When Is Surgery Needed?  If nonsurgical treatments  fail to relieve bunion pain and when the pain of a bunion interferes with daily activities, it is time to discuss surgical options with a foot and ankle surgeon. Together you can decide if surgery is best for you.    A variety of surgical procedures is available to treat bunions. The procedures are designed to remove the bump of bone, correct the changes in the bony structure of the foot and correct soft tissue changes that may also have occurred. The goal of surgery is the reduction of pain and deformity.    In selecting the procedure or combination of procedures for your particular case, the foot and ankle surgeon will take into consideration the extent of your deformity based on the x-ray findings, your age, your activity level and other factors. The length of the recovery period will vary, depending on the procedure or procedures performed.                                          Bunion    You have a bunion. This is a bony bump at the base of your big toe, along the inside edge of your foot. As the bump gets bigger, it can become red, swollen, and painful with shoe wear.  Bunions may occur if you wear shoes that are too tight and pinch your toes together. High heels may make this worse. In some cases a bunion is due to poor alignment of the foot and ankle. This puts extra weight on the instep of each foot.  Once a bunion forms, it changes the way weight is spread all across your foot. This causes the bunion to get worse over time. The big toe will bend more and more toward the other toes.  A minor bunion can be treated by:  Wearing properly fitting shoes  Using bunion pads  Wearing shoe inserts, called orthotics, to better align the foot and ankle  Physical therapy with ultrasound or whirlpool baths can ease pain, redness, and swelling. Severe cases may require surgery. If you dont treat what is causing the bunion, it may get larger and more painful.  Home care  Limit high heels. These shoes force your foot  forward, crowding the toes together.  Switch to comfortable shoes with a wide toe area. Or have your existing shoes stretched by a shoe repair shop.  Avoid shoes that are tight, narrow, or pointed.  If you are flat-footed, using arch supports may help prevent further deformity. The best shoe inserts are the ones custom made by a foot specialist, called a podiatrist, or other healthcare provider.  Put a bunion pad over the bunion to ease pressure of your shoe against the bunion. You can buy these pads at most pharmacies without a prescription  To reduce pain and swelling, apply an ice pack over the injured area for 15 to 20 minutes. Do this every 1 to 2 hours the first day. Keep using ice 3 to 4 times a day until the pain and swelling goes away.  To make an ice pack, put ice cubes in a sealed zip-lock plastic bag. Wrap the bag in a clean, thin towel or cloth. Never put ice or an ice pack directly on the skin.  You may use over-the-counter pain medicine to control pain, unless another medicine was prescribed. Talk with your provider before using these medicines if you have chronic liver or kidney disease, or ever had a stomach ulcer or GI (gastrointestinal) bleeding.    Follow-up care  Follow up with a podiatrist or foot doctor, or as advised.  If X-rays were taken, you will be notified of any new findings that may affect your care.  When to seek medical care  Contact your healthcare provider if any of the following occur:  Increasing pain or redness around the base of the big toe  Painful ingrown toenail, with redness and swelling or pus around the nail  Date Last Reviewed: 11/21/2015  © 0852-5978 [employer] [address] [place] [address]. All rights reserved. This information is not intended as a substitute for professional medical care. Always follow your healthcare professional's instructions.        Treating Bunions  Although a bunion wont go away, wearing shoes that fit properly will  "often relieve the pain. Padding and icing the bunion may also help. Bunions that remain painful may need surgery.     Heels: Heel height should be low. The back of the shoe should  your heel firmly so the shoe doesn't flop when you walk.         Toes: There should be 1/2" between your longest toe and the tip of the shoe. The shoe should be wide enough for you to wiggle your toes.    Shoes  To relieve a bunion, you dont have to buy shoes that are ugly or out of fashion. But follow these tips:  Shop for shoes late in the day. This is when your feet are the largest.  Have both feet measured often. Fit shoes to your larger foot.  Look for shoes that have the same shape as your foot but are slightly wider across the toes.  Choose low-heeled shoes.  Always try shoes on. Stand up and walk around. If the shoes arent comfortable, dont buy them.  Ice massage  To help relieve a painful bunion, put an ice cube in a plastic bag. Rub the ice on the bunion for 5 minutes. Repeat 2 to 3 times a day.  Pads  You may want to put a pad over the bunion to cushion it. You can buy bunion pads at [facility].  Surgery  Wearing wider shoes and padding the bunion may not relieve the pain. Your healthcare provider may then suggest surgery. During surgery, the bunion is shaved away and the bones are put back in a straight line.   Date Last Reviewed: 9/27/2015 © 2000-2016 [employer] [address] [place] [address], [place] [address]. All rights reserved. This information is not intended as a substitute for professional medical care. Always follow your healthcare professional's instructions.      Osteotomy and Ligament or Tendon Repair (Bunion Surgery)  Osteotomy and ligament or tendon repair is a type of bunion surgery. A bunion is a bony bump (growth) at the base of your big toe. This growth can form when your big toe pushes against your next toe. A bunion can cause pain, swelling, redness, and other symptoms. During this " surgery, bone is removed from your toe. Nearby tendons and ligaments are made shorter or longer as needed. This allows your big toe to line up (align) properly.    Preparing for surgery  Follow any instructions from your healthcare provider.  Tell your surgeon about any medicines you are taking. You may need to stop taking all or some of these before the procedure. This includes:  All prescription medicines  Over-the-counter medicines such as aspirin or ibuprofen  Street drugs  Herbs, vitamins, and other supplements  Also, follow any directions youre given for not eating or drinking before surgery.  The day of surgery  The surgery takes at least 60 minutes. You will likely go home the same day.  Before the surgery begins  An IV (intravenous) line is put into a vein in your arm or hand. This line gives you fluids and medicines.  You may be given medicine to help you relax (sedation). To keep you free of pain during the surgery, you may have medicine to block the nerves in your foot. Or, you will be given general anesthesia. This puts you into a deep sleep.  During the surgery  A cut (incision) is made on your foot to expose the bunion bump, and the tendons and ligaments around it. The tendons and ligaments that are tight are cut (released).  The bunion bump is removed with a bone saw. Your big toe bone or the main bone in your foot is shortened and realigned. A pin, screw, or plate is used to hold your toe and foot bones together.  The nearby tendons and ligaments may be tightened. If there is extra tissue, it is removed and the ends are stitched (sutured) together. The incision in your skin is then closed with sutures. Your foot is bandaged.  After the surgery  Youll be taken to a recovery room. You may have medicines to manage pain. You may wear a brace, surgical shoe, or cast to protect your foot while it heals. The surgeon will tell you when you can go home. Have an adult family member or friend drive  you.  Recovering at home  Once home, follow any instructions you are given. During your recovery:  Take pain medicine exactly as directed.  To prevent swelling, sit or lie with your leg raised on one or more pillows. Do this for the first 2 days.  Follow your surgeon's instructions about putting weight on your foot after the surgery. You may need to use a walker, cane, or crutches for a time.  You may wear a brace, surgical shoe, or cast for up to a month or longer. Care for this as instructed. Keep it dry by wrapping it in plastic bags when bathing.  Avoid sports and other activities until your surgeon says its OK.  Care for your incision as instructed.  Don't drive until your surgeon says its OK.  Call your surgeon if you have any of the following:  Chest pain or trouble breathing  Fever of 100.4°F (38°C) or higher, or as directed by your surgeon  Pain that isnt helped by medicine or rest  Increased swelling not helped by raising or icing your foot  Signs of infection at any incision site, such as increased redness or swelling, warmth, more pain, or bad-smelling drainage  Bleeding through the bandages  Symptoms of poor circulation, such as toes that look blue instead of pinkish  Numbness that doesnt go away  Any other signs or symptoms indicated by your surgeon   Follow-up  Keep all follow-up appointments with your surgeon. These are to check that you are healing well from the surgery. You may have X-rays to check how the bone is healing. Physical therapy, foot exercises, and other treatments may be discussed at follow-up visits. Full recovery can take at least a few months.  Risks and possible complications include:  Infection  Bleeding  Sensitivity at the incision site for months after the surgery  Foot pain that doesnt go away after surgery  Numbness in the foot  Only partial relief of symptoms, or no relief of symptoms  Return of the bunion  Risks of anesthesia (the anesthesiologist will discuss these  with you)  Poor wound healing  Breakage of screws or pins  A lot of scarring   Date Last Reviewed: 7/28/2015  © 1729-2453 [employer] [address] [place] [address], [place] [address]. All rights reserved. This information is not intended as a substitute for professional medical care. Always follow your healthcare professional's instructions.

## 2025-08-09 DIAGNOSIS — F41.9 ANXIETY: ICD-10-CM

## 2025-08-09 DIAGNOSIS — F32.A ANXIETY AND DEPRESSION: ICD-10-CM

## 2025-08-09 DIAGNOSIS — F41.9 ANXIETY AND DEPRESSION: ICD-10-CM

## 2025-08-12 RX ORDER — VENLAFAXINE 75 MG/1
75 TABLET ORAL DAILY
Qty: 90 TABLET | Refills: 3 | Status: SHIPPED | OUTPATIENT
Start: 2025-08-12 | End: 2026-08-12

## 2025-09-02 ENCOUNTER — HOSPITAL ENCOUNTER (OUTPATIENT)
Dept: RADIOLOGY | Facility: HOSPITAL | Age: 55
Discharge: HOME | End: 2025-09-02
Attending: PHYSICIAN ASSISTANT
Payer: COMMERCIAL

## 2025-09-02 ENCOUNTER — OFFICE VISIT (OUTPATIENT)
Dept: SURGERY | Facility: CLINIC | Age: 55
End: 2025-09-02
Attending: PHYSICIAN ASSISTANT
Payer: COMMERCIAL

## 2025-09-02 VITALS
OXYGEN SATURATION: 99 % | DIASTOLIC BLOOD PRESSURE: 99 MMHG | HEART RATE: 70 BPM | BODY MASS INDEX: 29.8 KG/M2 | WEIGHT: 185.44 LBS | HEIGHT: 66 IN | SYSTOLIC BLOOD PRESSURE: 151 MMHG

## 2025-09-02 DIAGNOSIS — C50.912 INFILTRATING DUCTAL CARCINOMA OF LEFT BREAST: ICD-10-CM

## 2025-09-02 DIAGNOSIS — Z12.31 SCREENING MAMMOGRAM, ENCOUNTER FOR: ICD-10-CM

## 2025-09-02 DIAGNOSIS — Z08 ENCOUNTER FOR FOLLOW-UP EXAMINATION AFTER COMPLETED TREATMENT FOR MALIGNANT NEOPLASM: Primary | ICD-10-CM

## 2025-09-02 PROCEDURE — 77062 BREAST TOMOSYNTHESIS BI: CPT | Mod: TC

## 2025-09-02 PROCEDURE — 76642 ULTRASOUND BREAST LIMITED: CPT | Mod: TC,LT

## 2025-09-02 PROCEDURE — 99213 OFFICE O/P EST LOW 20 MIN: CPT | Mod: S$GLB,,, | Performed by: SURGERY

## 2025-09-02 PROCEDURE — 99999 PR PBB SHADOW E&M-EST. PATIENT-LVL III: CPT | Mod: PBBFAC,,, | Performed by: SURGERY

## (undated) DEVICE — SUT MCRYL PLUS 4-0 PS2 27IN

## (undated) DEVICE — SOL IRRI STRL WATER 1000ML

## (undated) DEVICE — POSITIONER IV ARMBOARD FOAM

## (undated) DEVICE — Device

## (undated) DEVICE — APPLIER CLIP LIAGCLIP 9.375IN

## (undated) DEVICE — NDL HYPO REG 25G X 1 1/2

## (undated) DEVICE — COVER PROBE US 5.5X58L NON LTX

## (undated) DEVICE — EVACUATOR WOUND BULB 100CC

## (undated) DEVICE — POSITIONER HEEL FOAM CONVOLTD

## (undated) DEVICE — SYS CLSR DERMABOND PRINEO 42CM

## (undated) DEVICE — TRAY CATH 1-LYR URIMTR 16FR

## (undated) DEVICE — DRAPE THREE-QTR REINF 53X77IN

## (undated) DEVICE — BANDAGE BULKEE II 2.25INX3YD

## (undated) DEVICE — SUT STRATAFIX PGAPCL 3 FS-1

## (undated) DEVICE — ELECTRODE BLD EXT INSUL 1

## (undated) DEVICE — GOWN NONREINF SET-IN SLV XL

## (undated) DEVICE — SOL NORMAL USPCA 0.9%

## (undated) DEVICE — DRAIN CHANNEL ROUND 15FR

## (undated) DEVICE — SUT PROLENE 4-0 PS2 18 BLUE

## (undated) DEVICE — SYR SALINE FLSH PRFL STRL 10ML

## (undated) DEVICE — BANDAGE ROLL COTTN 4.5INX4.1YD

## (undated) DEVICE — ELECTRODE BLADE INSULATED 1 IN

## (undated) DEVICE — SUT VICRYL 3-0 27 SH

## (undated) DEVICE — TOWEL OR DISP STRL BLUE 4/PK

## (undated) DEVICE — SUT MONOCRYL 3-0 PS-2 UND

## (undated) DEVICE — DRAPE STERI INSTRUMENT 1018

## (undated) DEVICE — SYS CLSR DERMABOND PRINEO 22CM

## (undated) DEVICE — HOLDER DRAIN POUCH PINK

## (undated) DEVICE — SPONGE LAP 18X18 PREWASHED

## (undated) DEVICE — MARKER SKIN STND TIP BLUE BARR

## (undated) DEVICE — SHEATH GUIDE SCOUT SURG RADAR

## (undated) DEVICE — GOWN SMART IMP BREATHABLE XXLG

## (undated) DEVICE — APPLICATOR CHLORAPREP ORN 26ML

## (undated) DEVICE — CONTAINER SPEC OR STRL 4.5OZ

## (undated) DEVICE — GOWN ECLIPSE REINF LVL4 TWL LG

## (undated) DEVICE — MARGIN MARKER STANDARD 6 COLOR

## (undated) DEVICE — SUT PROLENE 4-0 MONO 18IN

## (undated) DEVICE — STAPLER SKIN ROTATING HEAD

## (undated) DEVICE — SUT VICRYL PLUS 4-0 PS2 27

## (undated) DEVICE — GLOVE BIOGEL SKINSENSE PI 7.0

## (undated) DEVICE — ELECTRODE REM PLYHSV RETURN 9

## (undated) DEVICE — STRIP MEDI WND CLSR 1/2X4IN

## (undated) DEVICE — DRESSING TEGADERM CHG 4X7IN

## (undated) DEVICE — SUT VICRYL PLUS 2-0 CT1 18

## (undated) DEVICE — SYR 10CC LUER LOCK

## (undated) DEVICE — SYR ONLY LUER LOCK 20CC

## (undated) DEVICE — SUT STRATAFIX PGAPCL 4 FS-2

## (undated) DEVICE — NDL ECLIPSE SAFETY 23G 1.5IN

## (undated) DEVICE — GLOVE BIOGEL SKINSENSE PI 6.5

## (undated) DEVICE — UNDERGLOVES BIOGEL PI SZ 7 LF